# Patient Record
Sex: MALE | Race: WHITE | NOT HISPANIC OR LATINO | URBAN - METROPOLITAN AREA
[De-identification: names, ages, dates, MRNs, and addresses within clinical notes are randomized per-mention and may not be internally consistent; named-entity substitution may affect disease eponyms.]

---

## 2017-06-27 ENCOUNTER — INPATIENT (INPATIENT)
Facility: HOSPITAL | Age: 82
LOS: 7 days | Discharge: NURSING, REHAB OR LONG-TERM CARE | DRG: 65 | End: 2017-07-05
Attending: INTERNAL MEDICINE | Admitting: HOSPITALIST
Payer: MEDICARE

## 2017-06-27 VITALS
HEIGHT: 68 IN | SYSTOLIC BLOOD PRESSURE: 126 MMHG | RESPIRATION RATE: 20 BRPM | DIASTOLIC BLOOD PRESSURE: 73 MMHG | HEART RATE: 67 BPM | WEIGHT: 154.98 LBS | OXYGEN SATURATION: 97 % | TEMPERATURE: 98 F

## 2017-06-27 DIAGNOSIS — R29.810 FACIAL WEAKNESS: ICD-10-CM

## 2017-06-27 LAB
ALBUMIN SERPL ELPH-MCNC: 3.6 G/DL — SIGNIFICANT CHANGE UP (ref 3.3–5.2)
ALP SERPL-CCNC: 51 U/L — SIGNIFICANT CHANGE UP (ref 40–120)
ALT FLD-CCNC: 10 U/L — SIGNIFICANT CHANGE UP
ANION GAP SERPL CALC-SCNC: 11 MMOL/L — SIGNIFICANT CHANGE UP (ref 5–17)
APPEARANCE UR: CLEAR — SIGNIFICANT CHANGE UP
AST SERPL-CCNC: 17 U/L — SIGNIFICANT CHANGE UP
BACTERIA # UR AUTO: ABNORMAL
BASOPHILS # BLD AUTO: 0 K/UL — SIGNIFICANT CHANGE UP (ref 0–0.2)
BASOPHILS NFR BLD AUTO: 0.6 % — SIGNIFICANT CHANGE UP (ref 0–2)
BILIRUB SERPL-MCNC: 1.6 MG/DL — SIGNIFICANT CHANGE UP (ref 0.4–2)
BILIRUB UR-MCNC: NEGATIVE — SIGNIFICANT CHANGE UP
BUN SERPL-MCNC: 15 MG/DL — SIGNIFICANT CHANGE UP (ref 8–20)
CALCIUM SERPL-MCNC: 8.6 MG/DL — SIGNIFICANT CHANGE UP (ref 8.6–10.2)
CHLORIDE SERPL-SCNC: 100 MMOL/L — SIGNIFICANT CHANGE UP (ref 98–107)
CO2 SERPL-SCNC: 25 MMOL/L — SIGNIFICANT CHANGE UP (ref 22–29)
COLOR SPEC: YELLOW — SIGNIFICANT CHANGE UP
CREAT SERPL-MCNC: 0.91 MG/DL — SIGNIFICANT CHANGE UP (ref 0.5–1.3)
DIFF PNL FLD: NEGATIVE — SIGNIFICANT CHANGE UP
EOSINOPHIL # BLD AUTO: 0.1 K/UL — SIGNIFICANT CHANGE UP (ref 0–0.5)
EOSINOPHIL NFR BLD AUTO: 2.1 % — SIGNIFICANT CHANGE UP (ref 0–6)
EPI CELLS # UR: SIGNIFICANT CHANGE UP
GLUCOSE SERPL-MCNC: 137 MG/DL — HIGH (ref 70–115)
GLUCOSE UR QL: NEGATIVE MG/DL — SIGNIFICANT CHANGE UP
HCT VFR BLD CALC: 40 % — LOW (ref 42–52)
HGB BLD-MCNC: 13.6 G/DL — LOW (ref 14–18)
KETONES UR-MCNC: ABNORMAL
LEUKOCYTE ESTERASE UR-ACNC: ABNORMAL
LYMPHOCYTES # BLD AUTO: 0.9 K/UL — LOW (ref 1–4.8)
LYMPHOCYTES # BLD AUTO: 16.6 % — LOW (ref 20–55)
MCHC RBC-ENTMCNC: 30.3 PG — SIGNIFICANT CHANGE UP (ref 27–31)
MCHC RBC-ENTMCNC: 34 G/DL — SIGNIFICANT CHANGE UP (ref 32–36)
MCV RBC AUTO: 89.1 FL — SIGNIFICANT CHANGE UP (ref 80–94)
MONOCYTES # BLD AUTO: 0.4 K/UL — SIGNIFICANT CHANGE UP (ref 0–0.8)
MONOCYTES NFR BLD AUTO: 8.3 % — SIGNIFICANT CHANGE UP (ref 3–10)
NEUTROPHILS # BLD AUTO: 3.8 K/UL — SIGNIFICANT CHANGE UP (ref 1.8–8)
NEUTROPHILS NFR BLD AUTO: 72.4 % — SIGNIFICANT CHANGE UP (ref 37–73)
NITRITE UR-MCNC: NEGATIVE — SIGNIFICANT CHANGE UP
PH UR: 6 — SIGNIFICANT CHANGE UP (ref 5–8)
PLATELET # BLD AUTO: 216 K/UL — SIGNIFICANT CHANGE UP (ref 150–400)
POTASSIUM SERPL-MCNC: 4 MMOL/L — SIGNIFICANT CHANGE UP (ref 3.5–5.3)
POTASSIUM SERPL-SCNC: 4 MMOL/L — SIGNIFICANT CHANGE UP (ref 3.5–5.3)
PROT SERPL-MCNC: 6.2 G/DL — LOW (ref 6.6–8.7)
PROT UR-MCNC: NEGATIVE MG/DL — SIGNIFICANT CHANGE UP
RBC # BLD: 4.49 M/UL — LOW (ref 4.6–6.2)
RBC # FLD: 13 % — SIGNIFICANT CHANGE UP (ref 11–15.6)
RBC CASTS # UR COMP ASSIST: SIGNIFICANT CHANGE UP /HPF (ref 0–4)
SODIUM SERPL-SCNC: 136 MMOL/L — SIGNIFICANT CHANGE UP (ref 135–145)
SP GR SPEC: 1.01 — SIGNIFICANT CHANGE UP (ref 1.01–1.02)
TROPONIN T SERPL-MCNC: <0.01 NG/ML — SIGNIFICANT CHANGE UP (ref 0–0.06)
UROBILINOGEN FLD QL: NEGATIVE MG/DL — SIGNIFICANT CHANGE UP
WBC # BLD: 5.3 K/UL — SIGNIFICANT CHANGE UP (ref 4.8–10.8)
WBC # FLD AUTO: 5.3 K/UL — SIGNIFICANT CHANGE UP (ref 4.8–10.8)
WBC UR QL: SIGNIFICANT CHANGE UP

## 2017-06-27 PROCEDURE — 70450 CT HEAD/BRAIN W/O DYE: CPT | Mod: 26

## 2017-06-27 PROCEDURE — 71010: CPT | Mod: 26

## 2017-06-27 PROCEDURE — 99285 EMERGENCY DEPT VISIT HI MDM: CPT

## 2017-06-27 RX ORDER — ENOXAPARIN SODIUM 100 MG/ML
40 INJECTION SUBCUTANEOUS EVERY 24 HOURS
Qty: 0 | Refills: 0 | Status: DISCONTINUED | OUTPATIENT
Start: 2017-06-27 | End: 2017-06-28

## 2017-06-27 RX ORDER — SODIUM CHLORIDE 9 MG/ML
1000 INJECTION INTRAMUSCULAR; INTRAVENOUS; SUBCUTANEOUS ONCE
Qty: 0 | Refills: 0 | Status: COMPLETED | OUTPATIENT
Start: 2017-06-27 | End: 2017-06-27

## 2017-06-27 RX ORDER — SODIUM CHLORIDE 9 MG/ML
3 INJECTION INTRAMUSCULAR; INTRAVENOUS; SUBCUTANEOUS EVERY 8 HOURS
Qty: 0 | Refills: 0 | Status: DISCONTINUED | OUTPATIENT
Start: 2017-06-27 | End: 2017-06-28

## 2017-06-27 RX ADMIN — SODIUM CHLORIDE 1000 MILLILITER(S): 9 INJECTION INTRAMUSCULAR; INTRAVENOUS; SUBCUTANEOUS at 17:00

## 2017-06-27 NOTE — ED ADULT NURSE NOTE - PMH
HTN (hypertension)    Memory loss or impairment HTN (hypertension)    Memory loss or impairment    Prostate CA

## 2017-06-27 NOTE — ED PROVIDER NOTE - MEDICAL DECISION MAKING DETAILS
86yoM with new L facial droop w/ forehead sparing since Friday.  Plan to check CTh, labs, likely admit for neuro workup 86yoM with new L facial droop w/ forehead sparing since Friday.  Plan to check CTh, labs, likely admit for neuro workup for CVA

## 2017-06-27 NOTE — ED PROVIDER NOTE - OBJECTIVE STATEMENT
Pt is a 86M with HTN, dementia, pw L facial droop since Friday and some mild confusion Pt is a 86M with HTN, dementia, p/w L facial droop since Friday and some mild confusion Pt is a 86M with HTN, dementia, p/w L facial droop since Friday, slightly increased confusion and unsteadiness on his feet.  Family denies prior h/o stroke. Pt normally ambulates independently.  Family notes frequent loose BM x 1 month. No recent antibiotics or hospitalizations.  PMD: Dr. Tj Oro

## 2017-06-27 NOTE — H&P ADULT - PSH
S/P cataract extraction and insertion of intraocular lens, left    S/P cataract extraction and insertion of intraocular lens, right

## 2017-06-27 NOTE — ED PROVIDER NOTE - PHYSICAL EXAMINATION
L facial droop with forehead sparing.  5/5 motor to b/l UE, LE. No sensory deficits  No limb ataxia  No neglect

## 2017-06-27 NOTE — ED ADULT NURSE NOTE - OBJECTIVE STATEMENT
assumed pt care at 1350.  pt awake and alert, hx of memory loss.  at per pts son at bedside, pt began having facial droop and slurred speech yesterday, also states pt recently had tooth pulled.  as per pts son, "pt is in a daze and out of it" more than normal. left sided facial droop present.  pupils equal.  moving all extremities well with equal strength and purpose.  also c/o diarrhea x1 month.  denies pain.  as per pt's son, pt more unsteady on his feet than normal. denies shortness of breath.  abdomen soft, nontender, nondistended.

## 2017-06-27 NOTE — ED ADULT TRIAGE NOTE - CHIEF COMPLAINT QUOTE
Patient arrived to ED today with c/o increased confusion, left sided facial droop for over the past two days, and patient is c/o lightheadedness.  Patient has a tooth pulled two days ago and son states that the droop was before his tooth was pulled.  Patient lives with his son and has history of dementia.

## 2017-06-28 DIAGNOSIS — I10 ESSENTIAL (PRIMARY) HYPERTENSION: ICD-10-CM

## 2017-06-28 DIAGNOSIS — R19.7 DIARRHEA, UNSPECIFIED: ICD-10-CM

## 2017-06-28 DIAGNOSIS — F03.90 UNSPECIFIED DEMENTIA, UNSPECIFIED SEVERITY, WITHOUT BEHAVIORAL DISTURBANCE, PSYCHOTIC DISTURBANCE, MOOD DISTURBANCE, AND ANXIETY: ICD-10-CM

## 2017-06-28 DIAGNOSIS — Z98.41 CATARACT EXTRACTION STATUS, RIGHT EYE: Chronic | ICD-10-CM

## 2017-06-28 DIAGNOSIS — I63.9 CEREBRAL INFARCTION, UNSPECIFIED: ICD-10-CM

## 2017-06-28 DIAGNOSIS — Z98.42 CATARACT EXTRACTION STATUS, LEFT EYE: Chronic | ICD-10-CM

## 2017-06-28 LAB
C DIFF BY PCR RESULT: SIGNIFICANT CHANGE UP
C DIFF TOX GENS STL QL NAA+PROBE: SIGNIFICANT CHANGE UP
CHOLEST SERPL-MCNC: 209 MG/DL — HIGH (ref 110–199)
HBA1C BLD-MCNC: 5.4 % — SIGNIFICANT CHANGE UP (ref 4–5.6)
HDLC SERPL-MCNC: 56 MG/DL — SIGNIFICANT CHANGE UP
LIPID PNL WITH DIRECT LDL SERPL: 132 MG/DL — SIGNIFICANT CHANGE UP
OB PNL STL: POSITIVE
TOTAL CHOLESTEROL/HDL RATIO MEASUREMENT: 4 RATIO — SIGNIFICANT CHANGE UP (ref 3.4–9.6)
TRIGL SERPL-MCNC: 104 MG/DL — SIGNIFICANT CHANGE UP (ref 10–200)

## 2017-06-28 PROCEDURE — 99233 SBSQ HOSP IP/OBS HIGH 50: CPT

## 2017-06-28 RX ORDER — HALOPERIDOL DECANOATE 100 MG/ML
1 INJECTION INTRAMUSCULAR ONCE
Qty: 0 | Refills: 0 | Status: COMPLETED | OUTPATIENT
Start: 2017-06-28 | End: 2017-06-28

## 2017-06-28 RX ORDER — DONEPEZIL HYDROCHLORIDE 10 MG/1
5 TABLET, FILM COATED ORAL AT BEDTIME
Qty: 0 | Refills: 0 | Status: DISCONTINUED | OUTPATIENT
Start: 2017-06-28 | End: 2017-06-28

## 2017-06-28 RX ORDER — ATORVASTATIN CALCIUM 80 MG/1
20 TABLET, FILM COATED ORAL AT BEDTIME
Qty: 0 | Refills: 0 | Status: DISCONTINUED | OUTPATIENT
Start: 2017-06-28 | End: 2017-06-28

## 2017-06-28 RX ORDER — METRONIDAZOLE 500 MG
TABLET ORAL
Qty: 0 | Refills: 0 | Status: DISCONTINUED | OUTPATIENT
Start: 2017-06-28 | End: 2017-06-30

## 2017-06-28 RX ORDER — METRONIDAZOLE 500 MG
500 TABLET ORAL ONCE
Qty: 0 | Refills: 0 | Status: COMPLETED | OUTPATIENT
Start: 2017-06-28 | End: 2017-06-28

## 2017-06-28 RX ORDER — ASPIRIN/CALCIUM CARB/MAGNESIUM 324 MG
325 TABLET ORAL DAILY
Qty: 0 | Refills: 0 | Status: DISCONTINUED | OUTPATIENT
Start: 2017-06-28 | End: 2017-06-28

## 2017-06-28 RX ORDER — LOSARTAN POTASSIUM 100 MG/1
50 TABLET, FILM COATED ORAL DAILY
Qty: 0 | Refills: 0 | Status: DISCONTINUED | OUTPATIENT
Start: 2017-06-28 | End: 2017-07-04

## 2017-06-28 RX ORDER — METRONIDAZOLE 500 MG
500 TABLET ORAL EVERY 8 HOURS
Qty: 0 | Refills: 0 | Status: DISCONTINUED | OUTPATIENT
Start: 2017-06-28 | End: 2017-06-30

## 2017-06-28 RX ORDER — MEMANTINE HYDROCHLORIDE 10 MG/1
5 TABLET ORAL DAILY
Qty: 0 | Refills: 0 | Status: DISCONTINUED | OUTPATIENT
Start: 2017-06-28 | End: 2017-06-28

## 2017-06-28 RX ADMIN — LOSARTAN POTASSIUM 50 MILLIGRAM(S): 100 TABLET, FILM COATED ORAL at 06:30

## 2017-06-28 RX ADMIN — SODIUM CHLORIDE 3 MILLILITER(S): 9 INJECTION INTRAMUSCULAR; INTRAVENOUS; SUBCUTANEOUS at 05:40

## 2017-06-28 RX ADMIN — SODIUM CHLORIDE 3 MILLILITER(S): 9 INJECTION INTRAMUSCULAR; INTRAVENOUS; SUBCUTANEOUS at 14:25

## 2017-06-28 RX ADMIN — HALOPERIDOL DECANOATE 1 MILLIGRAM(S): 100 INJECTION INTRAMUSCULAR at 23:32

## 2017-06-28 RX ADMIN — Medication 100 MILLIGRAM(S): at 18:11

## 2017-06-28 RX ADMIN — DONEPEZIL HYDROCHLORIDE 5 MILLIGRAM(S): 10 TABLET, FILM COATED ORAL at 23:32

## 2017-06-28 RX ADMIN — ENOXAPARIN SODIUM 40 MILLIGRAM(S): 100 INJECTION SUBCUTANEOUS at 06:30

## 2017-06-28 RX ADMIN — ATORVASTATIN CALCIUM 20 MILLIGRAM(S): 80 TABLET, FILM COATED ORAL at 23:32

## 2017-06-28 RX ADMIN — Medication 100 MILLIGRAM(S): at 11:32

## 2017-06-28 RX ADMIN — Medication 100 MILLIGRAM(S): at 01:55

## 2017-06-28 RX ADMIN — Medication 325 MILLIGRAM(S): at 11:33

## 2017-06-28 NOTE — CONSULT NOTE ADULT - SUBJECTIVE AND OBJECTIVE BOX
CHIEF COMPLAINT: falls, facial droop    HPI: 86yMale    87 y/o male with h/o mild dementia and HTN, prostate cancer, who was previously full ambulating, was noticed by the family that he has facial droop and looks weak and unsteady 4 days ago. no headache, speech changes, or blurry vision. also frequent bowel movements with loose stools for the last2-3 weeks. no h/o Abx. o nausea, vomiting, or fever. CT brain shows no acute changes.    PAST MEDICAL & SURGICAL HISTORY:  Prostate CA  Memory loss or impairment  HTN (hypertension)  S/P cataract extraction and insertion of intraocular lens, left  S/P cataract extraction and insertion of intraocular lens, right    MEDICATIONS  (STANDING):  enoxaparin Injectable 40 milliGRAM(s) SubCutaneous every 24 hours  sodium chloride 0.9% lock flush 3 milliLiter(s) IV Push every 8 hours  losartan 50 milliGRAM(s) Oral daily  metroNIDAZOLE  IVPB   IV Intermittent   metroNIDAZOLE  IVPB 500 milliGRAM(s) IV Intermittent every 8 hours  aspirin enteric coated 325 milliGRAM(s) Oral daily  atorvastatin 20 milliGRAM(s) Oral at bedtime    MEDICATIONS  (PRN):    Allergies    No Known Allergies    Intolerances        FAMILY HISTORY:  No pertinent family history in first degree relatives          SOCIAL HISTORY:    Tobacco:    Alcohol:    Drugs:          REVIEW OF SYSTEMS:    Relevant systems are negative except as noted in the chart, HPI, and PMH      VITAL SIGNS:  Vital Signs Last 24 Hrs  T(C): 36.2 (28 Jun 2017 07:45), Max: 36.9 (27 Jun 2017 12:31)  T(F): 97.2 (28 Jun 2017 07:45), Max: 98.4 (27 Jun 2017 12:31)  HR: 56 (28 Jun 2017 07:45) (56 - 67)  BP: 149/63 (28 Jun 2017 07:45) (126/73 - 149/63)  BP(mean): --  RR: 16 (28 Jun 2017 07:45) (16 - 20)  SpO2: 98% (28 Jun 2017 07:45) (97% - 100%)    PHYSICAL EXAMINATION:    General: Well-developed, well nourished, in no acute distress.  Cardiac:  Regular rate and rhythm. No carotid bruits appreciated.  Eyes: Fundoscopic examination was deferred.  Neurologic:  - Mental Status:  Alert, awake, oriented to person, place, and time; Speech is fluent. Language is normal. Follows commands well.  Insight and knowledge appear appropriate.  Cranial Nerves II-XII:    II:  Visual acuity is normal for age ; Visual fields are full to confrontation; Pupils are equal, round, and reactive to light.  III, IV, VI:  Extraocular movements are intact without nystagmus.  V:  Facial sensation is intact in the V1-V3 distribution bilaterally.  VII:  Face is symmetric with normal eye closure and smile  VIII:  Hearing is grossly intact  IX, X, XII:  speech is clear  XI:  Head turning and shoulder shrug are intact.  - Motor:  Strength is 5/5 x 4.   There is no pronator drift. .  - Reflexes:  2+ and symmetric at the knees.  Plantar responses flexor.  - Sensory:  Symmetric to light touch  - Coordination:  Finger-nose-finger is normal. Rapid alternating hand and foot  movements are intact. Dexterity appears normal      LABS:                          13.6   5.3   )-----------( 216      ( 27 Jun 2017 15:10 )             40.0     27 Jun 2017 15:10    136    |  100    |  15.0   ----------------------------<  137    4.0     |  25.0   |  0.91     Ca    8.6        27 Jun 2017 15:10    TPro  6.2    /  Alb  3.6    /  TBili  1.6    /  DBili  x      /  AST  17     /  ALT  10     /  AlkPhos  51     27 Jun 2017 15:10    LIVER FUNCTIONS - ( 27 Jun 2017 15:10 )  Alb: 3.6 g/dL / Pro: 6.2 g/dL / ALK PHOS: 51 U/L / ALT: 10 U/L / AST: 17 U/L / GGT: x                 RADIOLOGY & ADDITIONAL STUDIES:    CT- microvascular changes, no acute findings    IMPRESSION:      PLAN:  1. dementia work up- labs-TSH, B12, etc,   2. stroke protocols  3. Cerebrovascular risk factor assessment and management  4.Medical and Cardiac evaluation and treatment as indicated  5.PT- gait, rehab, dispo CHIEF COMPLAINT: falls, facial droop    HPI: 86yMale    87 y/o male with h/o mild dementia and HTN, prostate cancer, who was previously full ambulating, was noticed by the family that he has facial droop and looks weak and unsteady 4 days ago. no headache, speech changes, or blurry vision. also frequent bowel movements with loose stools for the last2-3 weeks. no h/o Abx. o nausea, vomiting, or fever. CT brain shows no acute changes.    PAST MEDICAL & SURGICAL HISTORY:  Prostate CA  Memory loss or impairment  HTN (hypertension)  S/P cataract extraction and insertion of intraocular lens, left  S/P cataract extraction and insertion of intraocular lens, right    MEDICATIONS  (STANDING):  enoxaparin Injectable 40 milliGRAM(s) SubCutaneous every 24 hours  sodium chloride 0.9% lock flush 3 milliLiter(s) IV Push every 8 hours  losartan 50 milliGRAM(s) Oral daily  metroNIDAZOLE  IVPB   IV Intermittent   metroNIDAZOLE  IVPB 500 milliGRAM(s) IV Intermittent every 8 hours  aspirin enteric coated 325 milliGRAM(s) Oral daily  atorvastatin 20 milliGRAM(s) Oral at bedtime    MEDICATIONS  (PRN):    Allergies    No Known Allergies    Intolerances        FAMILY HISTORY:  No pertinent family history in first degree relatives          SOCIAL HISTORY:    Tobacco:  no  Alcohol:  no  Drugs:  no        REVIEW OF SYSTEMS:    Relevant systems are negative except as noted in the chart, HPI, and PMH      VITAL SIGNS:  Vital Signs Last 24 Hrs  T(C): 36.2 (28 Jun 2017 07:45), Max: 36.9 (27 Jun 2017 12:31)  T(F): 97.2 (28 Jun 2017 07:45), Max: 98.4 (27 Jun 2017 12:31)  HR: 56 (28 Jun 2017 07:45) (56 - 67)  BP: 149/63 (28 Jun 2017 07:45) (126/73 - 149/63)  BP(mean): --  RR: 16 (28 Jun 2017 07:45) (16 - 20)  SpO2: 98% (28 Jun 2017 07:45) (97% - 100%)    PHYSICAL EXAMINATION:    General: Well-developed, well nourished, in no acute distress.  Cardiac:  Regular rate and rhythm. No carotid bruits appreciated.  Eyes: Fundoscopic examination was deferred.  Neurologic:  - Mental Status:  Alert, awake, oriented to person only Speech is slightly stuttered. Language is normal. Follows commands well.  Cranial Nerves II-XII:    II:  Visual acuity is normal for age ; Visual fields are full to confrontation; Pupils are equal, round, and reactive to light.  III, IV, VI:  Extraocular movements are intact without nystagmus.  V:  Facial sensation is intact  VII:  Face- left facial droop  VIII:  Hearing is grossly intact  IX, X, XII:  speech is clear  XI:  Head turning and shoulder shrug are intact.  - Motor:  Strength- moves right well. 4+/5 left   There is slight left pronator drift. .  - Reflexes:  1+ and symmetric at the knees.  Plantar responses flexor.  - Sensory:  Symmetric to light touch  - Coordination:   Right- Finger-nose-finger is normal. Rapid alternating hand and foot  movements are intact. Dexterity appears normal -       LABS:                          13.6   5.3   )-----------( 216      ( 27 Jun 2017 15:10 )             40.0     27 Jun 2017 15:10    136    |  100    |  15.0   ----------------------------<  137    4.0     |  25.0   |  0.91     Ca    8.6        27 Jun 2017 15:10    TPro  6.2    /  Alb  3.6    /  TBili  1.6    /  DBili  x      /  AST  17     /  ALT  10     /  AlkPhos  51     27 Jun 2017 15:10    LIVER FUNCTIONS - ( 27 Jun 2017 15:10 )  Alb: 3.6 g/dL / Pro: 6.2 g/dL / ALK PHOS: 51 U/L / ALT: 10 U/L / AST: 17 U/L / GGT: x                 RADIOLOGY & ADDITIONAL STUDIES:    CT- microvascular changes, no acute findings    IMPRESSION:      PLAN:  1. dementia work up- labs-TSH, B12, etc,  Consider aricept  2. stroke protocols  3. Cerebrovascular risk factor assessment and management  4.Medical and Cardiac evaluation and treatment as indicated  5.PT- gait, rehab, dispo

## 2017-06-28 NOTE — ED ADULT NURSE REASSESSMENT NOTE - NS ED NURSE REASSESS COMMENT FT1
dr fernandes aware of patient neg c diff + occult blood nno
pateint awake alert and oriented x 1 at baseline with dementia. nsr on cardiac monitor. moves all extremities without difficulty. stroke neuro checks as documented. denies any complaints.
pt incontinent of urine and stool. redness noted to sacrum/coccyx. barrier cream applied. incontinence care provided. stool sample sent.
son at bedside with dtr in law. updated regarding plan of care. patient tearful; expressing that he misses his wife and wants to be with her. patient denies si/hi. will speak with md regarding psych eval.
spoke with [facility] psych np.
pt ambulated to bathroom with assistance.  no signs of distress.  urine specimen sent to lab.  family at bedside.  awaiting CT
pt awake alert and oriented x 1 at baseline with dementia. patient denies any complaints at this time. sinus aury on cardiac monitor. moves all extremities without difficulty. awaiting sdu bed and mri. mri called; no answer.
rcd handoff report from offgoing RN, pt in no apparent distress, alert and awake, able to follow commands, no verbal responses at this time, pt pulled IV line and is incontinent of urine and feces, new saline lock placed and perineal care provided, linens changed, pt maintaining NSR on cardiac monitor, respirations even and unlabored, no obvious facial drop noted, pt refuse to smile for NIH assessment, able to move all extremities
pt with unsteady gait with pt. patient will require charity. patient confused; dementia at baseline. stroke neuro check as documented. awaiting bed and stool sample.
Received report from RN SAMAN Mensah. Patient bed moved; placed on cardiac monitor. Sinus bradycardia on cardiac monitor. Awake alert and oriented x2. Lungs clear to auscultation. respirations even and unlabored. abdomen soft non-tender. moves all extremities without difficulty. patient pending stool sample. awaiting mri. nih as documented.

## 2017-06-28 NOTE — ED ADULT NURSE REASSESSMENT NOTE - COMFORT CARE
plan of care explained/warm blanket provided/assisted to bathroom
po fluids offered/repositioned/meal provided
plan of care explained/repositioned/side rails up

## 2017-06-28 NOTE — ED ADULT NURSE REASSESSMENT NOTE - NIH STROKE SCALE: 9. BEST LANGUAGE, QM
(0) No aphasia; normal
(1) Mild-to-moderate aphasia; some obvious loss of fluency or facility of comprehension, w/o significant limitation on ideas expressed or form of expression. Reduction of speech and/or comprehension, however, makes conversation about provided material difficult or impossible.  For example, in conversation about provided materials, examiner can identify picture or naming card content from patient's response.

## 2017-06-28 NOTE — PROGRESS NOTE ADULT - SUBJECTIVE AND OBJECTIVE BOX
VEENA BHATT    [id number]    86y      Male    INTERVAL HPI/OVERNIGHT EVENTS: No events on.     Hospital course:  87 yo M with h/o dementia, prostate CA, HTN presented with worsening facial droop, weakness, and unsteadiness for 4 days. In the ED, head CT did not show acute changes. NIHSS 1.     REVIEW OF SYSTEMS:    CONSTITUTIONAL: No fevers  CARDIOVASCULAR: No chest pain, palpitations    Vital Signs Last 24 Hrs  T(C): 36.5 (2017 11:31), Max: 36.6 (2017 05:25)  T(F): 97.7 (2017 11:31), Max: 97.9 (2017 05:25)  HR: 67 (2017 11:31) (56 - 67)  BP: 150/72 (2017 11:31) (131/72 - 150/72)  BP(mean): --  RR: 16 (2017 11:31) (16 - 20)  SpO2: 96% (2017 11:31) (96% - 100%)    PHYSICAL EXAM:    GENERAL: NAD   HEENT: PERRL, +EOMI, MMM  CHEST/LUNG: Clear to percussion bilaterally   HEART: S1S2+, Regular rate and rhythm   ABDOMEN: Soft, Nontender, Nondistended; Bowel sounds present  NEURO: AAOX1 (self), +right facial droop      LABS:                        13.6   5.3   )-----------( 216      ( 2017 15:10 )             40.0     06-27    136  |  100  |  15.0  ----------------------------<  137<H>  4.0   |  25.0  |  0.91    Ca    8.6      2017 15:10    TPro  6.2<L>  /  Alb  3.6  /  TBili  1.6  /  DBili  x   /  AST  17  /  ALT  10  /  AlkPhos  51  -      Urinalysis Basic - ( 2017 18:49 )    Color: Yellow / Appearance: Clear / S.015 / pH: x  Gluc: x / Ketone: Trace  / Bili: Negative / Urobili: Negative mg/dL   Blood: x / Protein: Negative mg/dL / Nitrite: Negative   Leuk Esterase: Trace / RBC: 0-2 /HPF / WBC 3-5   Sq Epi: x / Non Sq Epi: x / Bacteria: x          MEDICATIONS  (STANDING):  enoxaparin Injectable 40 milliGRAM(s) SubCutaneous every 24 hours  sodium chloride 0.9% lock flush 3 milliLiter(s) IV Push every 8 hours  losartan 50 milliGRAM(s) Oral daily  metroNIDAZOLE  IVPB   IV Intermittent   metroNIDAZOLE  IVPB 500 milliGRAM(s) IV Intermittent every 8 hours  aspirin enteric coated 325 milliGRAM(s) Oral daily  atorvastatin 20 milliGRAM(s) Oral at bedtime    MEDICATIONS  (PRN):      RADIOLOGY & ADDITIONAL TESTS:

## 2017-06-29 DIAGNOSIS — F03.90 UNSPECIFIED DEMENTIA, UNSPECIFIED SEVERITY, WITHOUT BEHAVIORAL DISTURBANCE, PSYCHOTIC DISTURBANCE, MOOD DISTURBANCE, AND ANXIETY: ICD-10-CM

## 2017-06-29 LAB
ALBUMIN SERPL ELPH-MCNC: 3.6 G/DL — SIGNIFICANT CHANGE UP (ref 3.3–5.2)
ALP SERPL-CCNC: 52 U/L — SIGNIFICANT CHANGE UP (ref 40–120)
ALT FLD-CCNC: 10 U/L — SIGNIFICANT CHANGE UP
ANION GAP SERPL CALC-SCNC: 11 MMOL/L — SIGNIFICANT CHANGE UP (ref 5–17)
AST SERPL-CCNC: 15 U/L — SIGNIFICANT CHANGE UP
BILIRUB SERPL-MCNC: 1.6 MG/DL — SIGNIFICANT CHANGE UP (ref 0.4–2)
BUN SERPL-MCNC: 12 MG/DL — SIGNIFICANT CHANGE UP (ref 8–20)
CALCIUM SERPL-MCNC: 8.7 MG/DL — SIGNIFICANT CHANGE UP (ref 8.6–10.2)
CHLORIDE SERPL-SCNC: 101 MMOL/L — SIGNIFICANT CHANGE UP (ref 98–107)
CO2 SERPL-SCNC: 26 MMOL/L — SIGNIFICANT CHANGE UP (ref 22–29)
CREAT SERPL-MCNC: 0.84 MG/DL — SIGNIFICANT CHANGE UP (ref 0.5–1.3)
GLUCOSE SERPL-MCNC: 103 MG/DL — SIGNIFICANT CHANGE UP (ref 70–115)
HCT VFR BLD CALC: 39.5 % — LOW (ref 42–52)
HCT VFR BLD CALC: 40.2 % — LOW (ref 42–52)
HGB BLD-MCNC: 13.8 G/DL — LOW (ref 14–18)
HGB BLD-MCNC: 14 G/DL — SIGNIFICANT CHANGE UP (ref 14–18)
MAGNESIUM SERPL-MCNC: 2 MG/DL — SIGNIFICANT CHANGE UP (ref 1.6–2.6)
MCHC RBC-ENTMCNC: 30.5 PG — SIGNIFICANT CHANGE UP (ref 27–31)
MCHC RBC-ENTMCNC: 34.8 G/DL — SIGNIFICANT CHANGE UP (ref 32–36)
MCV RBC AUTO: 87.6 FL — SIGNIFICANT CHANGE UP (ref 80–94)
PLATELET # BLD AUTO: 226 K/UL — SIGNIFICANT CHANGE UP (ref 150–400)
POTASSIUM SERPL-MCNC: 3.8 MMOL/L — SIGNIFICANT CHANGE UP (ref 3.5–5.3)
POTASSIUM SERPL-SCNC: 3.8 MMOL/L — SIGNIFICANT CHANGE UP (ref 3.5–5.3)
PROT SERPL-MCNC: 6.2 G/DL — LOW (ref 6.6–8.7)
RBC # BLD: 4.59 M/UL — LOW (ref 4.6–6.2)
RBC # FLD: 13.3 % — SIGNIFICANT CHANGE UP (ref 11–15.6)
SODIUM SERPL-SCNC: 138 MMOL/L — SIGNIFICANT CHANGE UP (ref 135–145)
TSH SERPL-MCNC: 2.02 UIU/ML — SIGNIFICANT CHANGE UP (ref 0.27–4.2)
VIT B12 SERPL-MCNC: 346 PG/ML — SIGNIFICANT CHANGE UP (ref 180–914)
WBC # BLD: 7.2 K/UL — SIGNIFICANT CHANGE UP (ref 4.8–10.8)
WBC # FLD AUTO: 7.2 K/UL — SIGNIFICANT CHANGE UP (ref 4.8–10.8)

## 2017-06-29 PROCEDURE — 70551 MRI BRAIN STEM W/O DYE: CPT | Mod: 26

## 2017-06-29 PROCEDURE — 99233 SBSQ HOSP IP/OBS HIGH 50: CPT

## 2017-06-29 PROCEDURE — 70547 MR ANGIOGRAPHY NECK W/O DYE: CPT | Mod: 26

## 2017-06-29 PROCEDURE — 93306 TTE W/DOPPLER COMPLETE: CPT | Mod: 26

## 2017-06-29 PROCEDURE — 99223 1ST HOSP IP/OBS HIGH 75: CPT

## 2017-06-29 PROCEDURE — 70544 MR ANGIOGRAPHY HEAD W/O DYE: CPT | Mod: 26,59

## 2017-06-29 RX ADMIN — SODIUM CHLORIDE 3 MILLILITER(S): 9 INJECTION INTRAMUSCULAR; INTRAVENOUS; SUBCUTANEOUS at 06:32

## 2017-06-29 RX ADMIN — ATORVASTATIN CALCIUM 20 MILLIGRAM(S): 80 TABLET, FILM COATED ORAL at 23:07

## 2017-06-29 RX ADMIN — SODIUM CHLORIDE 3 MILLILITER(S): 9 INJECTION INTRAMUSCULAR; INTRAVENOUS; SUBCUTANEOUS at 14:49

## 2017-06-29 RX ADMIN — DONEPEZIL HYDROCHLORIDE 5 MILLIGRAM(S): 10 TABLET, FILM COATED ORAL at 23:07

## 2017-06-29 RX ADMIN — Medication 100 MILLIGRAM(S): at 13:09

## 2017-06-29 RX ADMIN — MEMANTINE HYDROCHLORIDE 5 MILLIGRAM(S): 10 TABLET ORAL at 13:11

## 2017-06-29 RX ADMIN — SODIUM CHLORIDE 3 MILLILITER(S): 9 INJECTION INTRAMUSCULAR; INTRAVENOUS; SUBCUTANEOUS at 22:06

## 2017-06-29 RX ADMIN — ENOXAPARIN SODIUM 40 MILLIGRAM(S): 100 INJECTION SUBCUTANEOUS at 06:34

## 2017-06-29 RX ADMIN — Medication 100 MILLIGRAM(S): at 02:30

## 2017-06-29 RX ADMIN — Medication 100 MILLIGRAM(S): at 23:07

## 2017-06-29 RX ADMIN — Medication 325 MILLIGRAM(S): at 13:10

## 2017-06-29 NOTE — DISCHARGE NOTE ADULT - SECONDARY DIAGNOSIS.
Dementia without behavioral disturbance, unspecified dementia type Essential hypertension Diarrhea, unspecified type

## 2017-06-29 NOTE — DISCHARGE NOTE ADULT - CARE PLAN
Principal Discharge DX:	Cerebrovascular accident (CVA), unspecified mechanism  Goal:	Therapeutic optimization  Instructions for follow-up, activity and diet:	Continue with aspirin. Follow up with your neurologist in one week.  Secondary Diagnosis:	Dementia without behavioral disturbance, unspecified dementia type  Instructions for follow-up, activity and diet:	Continue with namzaric.  Secondary Diagnosis:	Essential hypertension  Instructions for follow-up, activity and diet:	Continue with losartan.  Secondary Diagnosis:	Diarrhea, unspecified type  Instructions for follow-up, activity and diet:	Continue with flagyl as prescribed until July 3. Principal Discharge DX:	Cerebrovascular accident (CVA), unspecified mechanism  Goal:	Therapeutic optimization  Instructions for follow-up, activity and diet:	Continue with aspirin. Follow up with your neurologist in one week.  Secondary Diagnosis:	Dementia without behavioral disturbance, unspecified dementia type  Instructions for follow-up, activity and diet:	Continue with namzaric.  Secondary Diagnosis:	Essential hypertension  Instructions for follow-up, activity and diet:	Continue with losartan.  Secondary Diagnosis:	Diarrhea, unspecified type  Instructions for follow-up, activity and diet:	Antibiotics completed.

## 2017-06-29 NOTE — DISCHARGE NOTE ADULT - NS AS DC STROKE ED MATERIALS
Need for Followup After Discharge/Stroke Education Booklet/Risk Factors for Stroke/Call 911 for Stroke/Stroke Warning Signs and Symptoms/Prescribed Medications

## 2017-06-29 NOTE — ED ADULT NURSE REASSESSMENT NOTE - NIH STROKE SCALE: 6A. MOTOR LEG, LEFT, QM
(0) No drift; leg holds 30 degree position for full 5 secs

## 2017-06-29 NOTE — PROGRESS NOTE ADULT - SUBJECTIVE AND OBJECTIVE BOX
INTERVAL HISTORY:  new day. nothing new      VITAL SIGNS:  Vital Signs Last 24 Hrs  T(C): 36.6 (29 Jun 2017 07:12), Max: 36.6 (28 Jun 2017 15:25)  T(F): 97.8 (29 Jun 2017 07:12), Max: 97.9 (28 Jun 2017 15:25)  HR: 61 (29 Jun 2017 05:50) (61 - 71)  BP: 131/71 (29 Jun 2017 05:50) (116/60 - 150/72)  BP(mean): --  RR: 14 (29 Jun 2017 05:50) (14 - 16)  SpO2: 97% (29 Jun 2017 05:50) (96% - 99%)    PHYSICAL EXAMINATION:    Mentation:  awake, oriented to name only  Language/Speech: nl  CN:  Visual Fields:  Motor: moves 4 limbs  Sensory:  DTR:  Babinski:      MEDS:  MEDICATIONS  (STANDING):  enoxaparin Injectable 40 milliGRAM(s) SubCutaneous every 24 hours  sodium chloride 0.9% lock flush 3 milliLiter(s) IV Push every 8 hours  losartan 50 milliGRAM(s) Oral daily  metroNIDAZOLE  IVPB   IV Intermittent   metroNIDAZOLE  IVPB 500 milliGRAM(s) IV Intermittent every 8 hours  aspirin enteric coated 325 milliGRAM(s) Oral daily  atorvastatin 20 milliGRAM(s) Oral at bedtime  donepezil 5 milliGRAM(s) Oral at bedtime  memantine 5 milliGRAM(s) Oral daily    MEDICATIONS  (PRN):      LABS:                          13.8   x     )-----------( x        ( 29 Jun 2017 01:22 )             39.5     06-27    136  |  100  |  15.0  ----------------------------<  137<H>  4.0   |  25.0  |  0.91    Ca    8.6      27 Jun 2017 15:10    TPro  6.2<L>  /  Alb  3.6  /  TBili  1.6  /  DBili  x   /  AST  17  /  ALT  10  /  AlkPhos  51  06-27    LIVER FUNCTIONS - ( 27 Jun 2017 15:10 )  Alb: 3.6 g/dL / Pro: 6.2 g/dL / ALK PHOS: 51 U/L / ALT: 10 U/L / AST: 17 U/L / GGT: x               RADIOLOGY & ADDITIONAL STUDIES:    Awaiting MRI  Awaiting labs    IMPRESSION & PLAN:    No changes in past day. Still awaiting studies    R/o CVA  Dementia  Falls/Gait disturbance

## 2017-06-29 NOTE — ED ADULT NURSE REASSESSMENT NOTE - NURSING NEURO ORIENTATION
disoriented to time/situation/disoriented to place
disoriented to place/disoriented to time/situation
disoriented to place/disoriented to time/situation
disoriented to time/situation/disoriented to place

## 2017-06-29 NOTE — BEHAVIORAL HEALTH ASSESSMENT NOTE - HPI (INCLUDE ILLNESS QUALITY, SEVERITY, DURATION, TIMING, CONTEXT, MODIFYING FACTORS, ASSOCIATED SIGNS AND SYMPTOMS)
Patient is a 85 y/o  male with two adult children, PMH of HTN, dementia, and prostate ca, admitted to [facility] 6/27/2017 from home for increased confusion, unsteady gait and facial droop, Patient found to have acute nonhemorrhagic infarct to basal ganglia. Patient seen on medicine today, psychiatry consulted for depression.  Patient is currently alert and oriented x1. He has some difficulty with word find, obeys simple commands, and has slightly slurred speech.  Patient admits his mood is sad. He is confused and reports prior to coming in to the hospital he was home with his wife. He denies current or recent S/H/I/I/I, A/V/H, or thoughts of paranoia. Chart reports h/o dementia without behavioral disturbance. Patient reports he enjoys his sons and would like to go home.  Patient was seen by neurology and started on Aricept 5 mg daily and Namenda 5 mg daily.  Last evening he received Haldol 1 mg IM x1.  Received collateral from daughter-in-law who reports patient wife passed 5/2/2017. Daughter-in-law feels patient was grieving appropriately and prior to hospitalization [facility] and attended [facility] events. Although patient remained intermittently tearful regarding the loss of his wife, he appeared to enjoy activities with his family. Patient has had declining short term memory for the past year with intact long term memory. After the death of his wife he went to live with his son and daughter-in-law. He was able to shower and groom self however required assistance with meals and other ADL's.  Patient has never been tx for psychiatric reasons and has not h/o behavioral disturbances or substance abuse.

## 2017-06-29 NOTE — ED ADULT NURSE REASSESSMENT NOTE - FACIAL SYMMETRY
symmetrical
with smile/left facial droop
when smiling/left facial droop
left facial droop/only when smiling

## 2017-06-29 NOTE — DISCHARGE NOTE ADULT - PATIENT PORTAL LINK FT
“You can access the [facility] Patient Portal, offered by [facility], by registering with the following website: [email or web address]”

## 2017-06-29 NOTE — BEHAVIORAL HEALTH ASSESSMENT NOTE - SUMMARY
Patient is a 87 y/o  male with two adult children, PMH of HTN, dementia, and prostate ca, admitted to [facility] 6/27/2017 from home for increased confusion, unsteady gait and facial droop, Patient found to have acute nonhemorrhagic infarct to basal ganglia. Patient seen on medicine today, psychiatry consulted for depression  Patient seen and evaluation. Attempted to reach family for collateral. As per Dr Zamora, plan is to eventually discharge to [facility]. Patient presents calm, oriented x1 with impaired memory, attention, concentration. He is tearful when he talks about his family and home. He was seen by neurology and started on cholinesterase inhibitor for demential  Recommend  1. would not start antidepressant in the context of delirium  2. avoid benzodiazepines which can increase confusion  3. Patient should be followed by psychiatry in [facility]  4. psychiatry will follow at [facility] and assess mental status and mood sx.

## 2017-06-29 NOTE — ED ADULT NURSE REASSESSMENT NOTE - NURSING NEURO LEVEL OF CONSCIOUSNESS
alert and awake/follows commands
alert and awake/follows commands
alert and awake
alert and awake/follows commands

## 2017-06-29 NOTE — BEHAVIORAL HEALTH ASSESSMENT NOTE - NSBHSUICPROTECTFACT_PSY_A_CORE
Responsibility to family and others/Identifies reasons for living/Positive therapeutic relationships/Supportive social network or family

## 2017-06-29 NOTE — DISCHARGE NOTE ADULT - CARE PROVIDER_API CALL
Shorty,   Phone: (   )    -  Fax: (   )    - Shorty,   Phone: (   )    -  Fax: (   )    -    Dez Gooden (FELISHA; MPH), Internal Medicine  [address] [place] [address]  Phone: [phone]  Fax: [phone]    Satinder Bailey), Neurology  [address] [place] [address]  Phone: [phone]  Fax: [phone]

## 2017-06-29 NOTE — ED ADULT NURSE REASSESSMENT NOTE - NIH STROKE SCALE: 1B. LOC QUESTIONS, QM
(1) Answers one question correctly

## 2017-06-29 NOTE — DISCHARGE NOTE ADULT - PLAN OF CARE
Therapeutic optimization Continue with aspirin. Follow up with your neurologist in one week. Continue with namzaric. Continue with losartan. Continue with flagyl as prescribed until July 3. Antibiotics completed.

## 2017-06-29 NOTE — ED ADULT NURSE REASSESSMENT NOTE - NIH STROKE SCALE: 1C. LOC COMMANDS, QM
(0) Performs both tasks correctly

## 2017-06-29 NOTE — DISCHARGE NOTE ADULT - MEDICATION SUMMARY - MEDICATIONS TO TAKE
I will START or STAY ON the medications listed below when I get home from the hospital:    metroNIDAZOLE 500 mg oral tablet  -- 1 tab(s) by mouth every 8 hours  -- Indication: For Diarrhea, unspecified type    aspirin 325 mg oral delayed release tablet  -- 1 tab(s) by mouth once a day  -- Indication: For Cerebrovascular accident (CVA), unspecified mechanism    losartan 50 mg oral tablet  -- 1 tab(s) by mouth once a day  -- Indication: For Essential hypertension    atorvastatin 20 mg oral tablet  -- 1 tab(s) by mouth once a day (at bedtime)  -- Indication: For Cerebrovascular accident (CVA), unspecified mechanism    Namzaric 10 mg-7 mg oral capsule, extended release  -- 1 cap(s) by mouth once a day  -- Indication: For Dementia without behavioral disturbance, unspecified dementia type    cyanocobalamin 500 mcg oral tablet  -- 1 tab(s) by mouth once a day  -- Indication: For Dementia without behavioral disturbance, unspecified dementia type I will START or STAY ON the medications listed below when I get home from the hospital:    metroNIDAZOLE 500 mg oral tablet  -- 1 tab(s) by mouth every 8 hours  -- Indication: For Diarrhea, unspecified type    aspirin 325 mg oral delayed release tablet  -- 1 tab(s) by mouth once a day  -- Indication: For Cerebrovascular accident (CVA), unspecified mechanism    losartan 50 mg oral tablet  -- 1 tab(s) by mouth once a day  -- Indication: For Essential hypertension    atorvastatin 20 mg oral tablet  -- 1 tab(s) by mouth once a day (at bedtime)  -- Indication: For Cerebrovascular accident (CVA), unspecified mechanism    Namzaric 10 mg-7 mg oral capsule, extended release  -- 1 cap(s) by mouth once a day  -- Indication: For Dementia without behavioral disturbance, unspecified dementia type    ciprofloxacin 500 mg oral tablet  -- 1 tab(s) by mouth every 12 hours  -- Indication: For Diarrhea, unspecified type    cyanocobalamin 500 mcg oral tablet  -- 1 tab(s) by mouth once a day  -- Indication: For Dementia without behavioral disturbance, unspecified dementia type I will START or STAY ON the medications listed below when I get home from the hospital:    aspirin 325 mg oral delayed release tablet  -- 1 tab(s) by mouth once a day  -- Indication: For Cerebrovascular accident (CVA), unspecified mechanism    losartan 50 mg oral tablet  -- 1 tab(s) by mouth once a day  -- Indication: For Essential hypertension    atorvastatin 20 mg oral tablet  -- 1 tab(s) by mouth once a day (at bedtime)  -- Indication: For Cerebrovascular accident (CVA), unspecified mechanism    Namzaric 10 mg-7 mg oral capsule, extended release  -- 1 cap(s) by mouth once a day  -- Indication: For Dementia without behavioral disturbance, unspecified dementia type    cyanocobalamin 500 mcg oral tablet  -- 1 tab(s) by mouth once a day  -- Indication: For Dementia without behavioral disturbance, unspecified dementia type

## 2017-06-29 NOTE — ED ADULT NURSE REASSESSMENT NOTE - NIH STROKE SCALE: 5B. MOTOR ARM, RIGHT, QM
(0) No drift; limb holds 90 (or 45) degrees for full 10 secs

## 2017-06-29 NOTE — DISCHARGE NOTE ADULT - CARE PROVIDERS DIRECT ADDRESSES
,DirectAddress_Unknown ,DirectAddress_Unknown,[email or web address],DirectAddress_Unknown

## 2017-06-29 NOTE — ED ADULT NURSE REASSESSMENT NOTE - NIH STROKE SCALE: 4. FACIAL PALSY, QM
(1) Minor paralysis (flattened nasolabial fold, asymmetry on smiling)
(0) Normal symmetrical movements
(0) Normal symmetrical movements
(1) Minor paralysis (flattened nasolabial fold, asymmetry on smiling)

## 2017-06-29 NOTE — OCCUPATIONAL THERAPY INITIAL EVALUATION ADULT - PLANNED THERAPY INTERVENTIONS, OT EVAL
cognitive, visual perceptual/balance training/ADL retraining/bed mobility training/fine motor coordination training/motor coordination training/strengthening/transfer training/neuromuscular re-education

## 2017-06-29 NOTE — PROGRESS NOTE ADULT - SUBJECTIVE AND OBJECTIVE BOX
VEENA BHATT    [id number]    86y      Male    INTERVAL HPI/OVERNIGHT EVENTS: No events on. MRI head resulted with acute infarct of basal ganglia. Currently eating lunch. Offers no complaints. Reported by RN and social work that pt very tearful and depressed after the passing of his wife one month ago. Family requesting psych consult.     Hospital course:  87 yo M with h/o dementia, prostate CA, HTN presented with worsening facial droop, weakness, and unsteadiness for 4 days. In the ED, head CT did not show acute changes. NIHSS 1. . MRI head Acute nonhemorrhagic infarction right basal ganglia. MRA head and neck significant stenosis.       REVIEW OF SYSTEMS:    CONSTITUTIONAL: No fever  CARDIOVASCULAR: No chest pain    Vital Signs Last 24 Hrs  T(C): 36.6 (2017 07:12), Max: 36.6 (2017 15:25)  T(F): 97.8 (2017 07:12), Max: 97.9 (2017 15:25)  HR: 68 (2017 13:02) (56 - 71)  BP: 143/72 (2017 13:02) (105/55 - 143/72)  BP(mean): --  RR: 19 (2017 13:02) (14 - 19)  SpO2: 100% (2017 13:02) (97% - 100%) RA    PHYSICAL EXAM:    GENERAL: NAD, elderly, frail  HEENT: PERRL, +EOMI  NECK: soft, Supple, No JVD,   CHEST/LUNG: Clear to percussion bilaterally   HEART: S1S2+, Regular rate and rhythm   ABDOMEN: Soft, Nontender, Nondistended; Bowel sounds present    LABS:                        13.8   x     )-----------( x        ( 2017 01:22 )             39.5     06-27    136  |  100  |  15.0  ----------------------------<  137<H>  4.0   |  25.0  |  0.91    Ca    8.6      2017 15:10    TPro  6.2<L>  /  Alb  3.6  /  TBili  1.6  /  DBili  x   /  AST  17  /  ALT  10  /  AlkPhos  51        Urinalysis Basic - ( 2017 18:49 )    Color: Yellow / Appearance: Clear / S.015 / pH: x  Gluc: x / Ketone: Trace  / Bili: Negative / Urobili: Negative mg/dL   Blood: x / Protein: Negative mg/dL / Nitrite: Negative   Leuk Esterase: Trace / RBC: 0-2 /HPF / WBC 3-5   Sq Epi: x / Non Sq Epi: x / Bacteria: x          MEDICATIONS  (STANDING):  enoxaparin Injectable 40 milliGRAM(s) SubCutaneous every 24 hours  sodium chloride 0.9% lock flush 3 milliLiter(s) IV Push every 8 hours  losartan 50 milliGRAM(s) Oral daily  metroNIDAZOLE  IVPB   IV Intermittent   metroNIDAZOLE  IVPB 500 milliGRAM(s) IV Intermittent every 8 hours  aspirin enteric coated 325 milliGRAM(s) Oral daily  atorvastatin 20 milliGRAM(s) Oral at bedtime  donepezil 5 milliGRAM(s) Oral at bedtime  memantine 5 milliGRAM(s) Oral daily    MEDICATIONS  (PRN):      RADIOLOGY & ADDITIONAL TESTS:

## 2017-06-29 NOTE — PATIENT PROFILE ADULT. - PRO INTERPRETER NEED 2
*PCF OU; BECOMING VISUALLY SIGNIFICANT BUT NOT BOTHERSOME TO PATIENT AT THIS TIME. CONTINUE TO FOLLOW FOR NOW. OFFER SPEC RX UPDATE. English

## 2017-06-29 NOTE — DISCHARGE NOTE ADULT - PROVIDER TOKENS
FREE:[LAST:[Shorty],PHONE:[(   )    -],FAX:[(   )    -]] FREE:[LAST:[Shorty],PHONE:[(   )    -],FAX:[(   )    -]],TOKEN:'[id number]',TOKEN:'[id number]'

## 2017-06-29 NOTE — DISCHARGE NOTE ADULT - HOSPITAL COURSE
87 yo M with h/o dementia, prostate CA, HTN presented with worsening facial droop, weakness, and unsteadiness for 4 days. In the ED, head CT did not show acute changes. NIHSS 1. . MRI head Acute nonhemorrhagic infarction right basal ganglia. MRA head and neck significant stenosis. Per cardio, no IRINA needed. Echo showed EF 60% and grade I diastolic dysfunction. Physical therapy recommending [facility]J vs. BIU.     Time to discharge: >35 minutes Patient: VEENA BHATT [id number]                 Internal Medicine Hospitalist Discharge Note - Dr. Jose Tyler      Chief Complaint: Patient is a 86y old  Male who presents with a chief complaint of facial droop (2017 05:56)    Hospital course:  85 yo M with h/o dementia, prostate CA, HTN presented with worsening facial droop, weakness, and unsteadiness for 4 days. In the ED, head CT did not show acute changes. NIHSS 1. . MRI head Acute nonhemorrhagic infarction right basal ganglia. MRA head and neck significant stenosis. Per cardio, no IRINA needed. Echo showed EF 60% and grade I diastolic dysfunction. Physical therapy recommending [facility] vs. BIU. PMNR recommend [facility].     Patient denies complaints.        VITALS:  Vital Signs Last 24 Hrs  T(C): 36.2 (2017 07:56), Max: 37 (2017 20:00)  T(F): 97.2 (2017 07:56), Max: 98.6 (2017 20:00)  HR: 72 (2017 07:56) (67 - 77)  BP: 138/79 (2017 05:26) (110/74 - 138/79)  BP(mean): 86 (2017 20:17) (86 - 86)  RR: 14 (2017 07:56) (11 - 18)  SpO2: 99% (2017 20:17) (96% - 99%) Daily     Daily Weight in k.7 (2017 05:26)  CAPILLARY BLOOD GLUCOSE        I&O's Summary      -  2017 07:00  --------------------------------------------------------  IN: 240 mL / OUT: 0 mL / NET: 240 mL    2017 07:  -  2017 10:55  --------------------------------------------------------  IN: 240 mL / OUT: 0 mL / NET: 240 mL        LABS:                        13.9   6.9   )-----------( 270      ( 2017 06:50 )             40.1     07-04    139  |  101  |  16.0  ----------------------------<  103  4.1   |  26.0  |  0.92    Ca    8.7      2017 06:50  Mg     2.0     07-04    TPro  6.0<L>  /  Alb  3.3  /  TBili  1.0  /  DBili  x   /  AST  77<H>  /  ALT  47<H>  /  AlkPhos  51  07-      RECENT CULTURES:      LIVER FUNCTIONS - ( 2017 06:50 )  Alb: 3.3 g/dL / Pro: 6.0 g/dL / ALK PHOS: 51 U/L / ALT: 47 U/L / AST: 77 U/L / GGT: x           85 yo M with h/o dementia, prostate CA, HTN here with acute infarct of right basal ganglia.     Problem/Plan - 1:  ·  Problem: Cerebrovascular accident (CVA), unspecified mechanism.  Plan: MRI head showing acute infarct of right basal ganglia.  C/w asa and statin  NIHSS 1    A1C 5.4  [facility].     Problem/Plan - 2:  ·  Problem: Dementia without behavioral disturbance, unspecified dementia type.  Plan: B12 low - c/w B12  RPR negative  TSH normal  On namzaric.     Problem/Plan - 3:  ·  Problem: Diarrhea, unspecified type.  Plan: C. diff negative  Stool Cultures negative.  D/c antibiotics.C. diff negative  Stool culture pending  Continue Cipro.  Add probiotic.   S/p flagyl.     Problem/Plan - 4:  ·  Problem: Essential hypertension.  Plan: Normotensive  C/w losartan.     Attending Attestation:   Plan d/c to [facility].          Disposition: stable for discharge.  Outpatient followup as above.  Patient was advised to return if they experience any recurrence or worsening of symptoms.  Total time spent on discharge was 35 minutes.  -Jose Tyler D.O., Hospitalist, [facility]

## 2017-06-29 NOTE — ED ADULT NURSE REASSESSMENT NOTE - NIH STROKE SCALE: 1A. LEVEL OF CONSCIOUSNESS, QM
(0) Alert; keenly responsive

## 2017-06-30 LAB
ALBUMIN SERPL ELPH-MCNC: 3.3 G/DL — SIGNIFICANT CHANGE UP (ref 3.3–5.2)
ALP SERPL-CCNC: 48 U/L — SIGNIFICANT CHANGE UP (ref 40–120)
ALT FLD-CCNC: 10 U/L — SIGNIFICANT CHANGE UP
ANION GAP SERPL CALC-SCNC: 12 MMOL/L — SIGNIFICANT CHANGE UP (ref 5–17)
AST SERPL-CCNC: 13 U/L — SIGNIFICANT CHANGE UP
BILIRUB SERPL-MCNC: 1.5 MG/DL — SIGNIFICANT CHANGE UP (ref 0.4–2)
BUN SERPL-MCNC: 14 MG/DL — SIGNIFICANT CHANGE UP (ref 8–20)
CALCIUM SERPL-MCNC: 8.4 MG/DL — LOW (ref 8.6–10.2)
CHLORIDE SERPL-SCNC: 102 MMOL/L — SIGNIFICANT CHANGE UP (ref 98–107)
CO2 SERPL-SCNC: 24 MMOL/L — SIGNIFICANT CHANGE UP (ref 22–29)
CREAT SERPL-MCNC: 0.92 MG/DL — SIGNIFICANT CHANGE UP (ref 0.5–1.3)
GLUCOSE SERPL-MCNC: 100 MG/DL — SIGNIFICANT CHANGE UP (ref 70–115)
HCT VFR BLD CALC: 39.2 % — LOW (ref 42–52)
HGB BLD-MCNC: 13.6 G/DL — LOW (ref 14–18)
MAGNESIUM SERPL-MCNC: 2.1 MG/DL — SIGNIFICANT CHANGE UP (ref 1.6–2.6)
MCHC RBC-ENTMCNC: 30.2 PG — SIGNIFICANT CHANGE UP (ref 27–31)
MCHC RBC-ENTMCNC: 34.7 G/DL — SIGNIFICANT CHANGE UP (ref 32–36)
MCV RBC AUTO: 87.1 FL — SIGNIFICANT CHANGE UP (ref 80–94)
PLATELET # BLD AUTO: 214 K/UL — SIGNIFICANT CHANGE UP (ref 150–400)
POTASSIUM SERPL-MCNC: 3.4 MMOL/L — LOW (ref 3.5–5.3)
POTASSIUM SERPL-SCNC: 3.4 MMOL/L — LOW (ref 3.5–5.3)
PROT SERPL-MCNC: 5.8 G/DL — LOW (ref 6.6–8.7)
RBC # BLD: 4.5 M/UL — LOW (ref 4.6–6.2)
RBC # FLD: 13.2 % — SIGNIFICANT CHANGE UP (ref 11–15.6)
RPR SERPL-ACNC: SIGNIFICANT CHANGE UP
SODIUM SERPL-SCNC: 138 MMOL/L — SIGNIFICANT CHANGE UP (ref 135–145)
WBC # BLD: 6.3 K/UL — SIGNIFICANT CHANGE UP (ref 4.8–10.8)
WBC # FLD AUTO: 6.3 K/UL — SIGNIFICANT CHANGE UP (ref 4.8–10.8)

## 2017-06-30 PROCEDURE — 99222 1ST HOSP IP/OBS MODERATE 55: CPT

## 2017-06-30 PROCEDURE — 99232 SBSQ HOSP IP/OBS MODERATE 35: CPT

## 2017-06-30 RX ORDER — CIPROFLOXACIN LACTATE 400MG/40ML
500 VIAL (ML) INTRAVENOUS EVERY 12 HOURS
Qty: 0 | Refills: 0 | Status: DISCONTINUED | OUTPATIENT
Start: 2017-06-30 | End: 2017-07-05

## 2017-06-30 RX ORDER — PREGABALIN 225 MG/1
500 CAPSULE ORAL DAILY
Qty: 0 | Refills: 0 | Status: DISCONTINUED | OUTPATIENT
Start: 2017-06-30 | End: 2017-06-28

## 2017-06-30 RX ORDER — ATORVASTATIN CALCIUM 80 MG/1
1 TABLET, FILM COATED ORAL
Qty: 0 | Refills: 0 | DISCHARGE
Start: 2017-06-30

## 2017-06-30 RX ORDER — METRONIDAZOLE 500 MG
1 TABLET ORAL
Qty: 0 | Refills: 0 | COMMUNITY
Start: 2017-06-30 | End: 2017-07-04

## 2017-06-30 RX ORDER — METRONIDAZOLE 500 MG
500 TABLET ORAL EVERY 8 HOURS
Qty: 0 | Refills: 0 | Status: COMPLETED | OUTPATIENT
Start: 2017-06-30 | End: 2017-07-03

## 2017-06-30 RX ORDER — ASPIRIN/CALCIUM CARB/MAGNESIUM 324 MG
1 TABLET ORAL
Qty: 0 | Refills: 0 | COMMUNITY
Start: 2017-06-30

## 2017-06-30 RX ORDER — PREGABALIN 225 MG/1
1 CAPSULE ORAL
Qty: 0 | Refills: 0 | COMMUNITY
Start: 2017-06-30

## 2017-06-30 RX ORDER — POTASSIUM CHLORIDE 20 MEQ
40 PACKET (EA) ORAL EVERY 4 HOURS
Qty: 0 | Refills: 0 | Status: COMPLETED | OUTPATIENT
Start: 2017-06-30 | End: 2017-06-30

## 2017-06-30 RX ADMIN — SODIUM CHLORIDE 3 MILLILITER(S): 9 INJECTION INTRAMUSCULAR; INTRAVENOUS; SUBCUTANEOUS at 05:36

## 2017-06-30 RX ADMIN — Medication 500 MILLIGRAM(S): at 22:13

## 2017-06-30 RX ADMIN — Medication 100 MILLIGRAM(S): at 05:36

## 2017-06-30 RX ADMIN — MEMANTINE HYDROCHLORIDE 5 MILLIGRAM(S): 10 TABLET ORAL at 12:32

## 2017-06-30 RX ADMIN — SODIUM CHLORIDE 3 MILLILITER(S): 9 INJECTION INTRAMUSCULAR; INTRAVENOUS; SUBCUTANEOUS at 22:09

## 2017-06-30 RX ADMIN — ATORVASTATIN CALCIUM 20 MILLIGRAM(S): 80 TABLET, FILM COATED ORAL at 22:13

## 2017-06-30 RX ADMIN — ENOXAPARIN SODIUM 40 MILLIGRAM(S): 100 INJECTION SUBCUTANEOUS at 05:43

## 2017-06-30 RX ADMIN — PREGABALIN 500 MICROGRAM(S): 225 CAPSULE ORAL at 14:00

## 2017-06-30 RX ADMIN — Medication 40 MILLIEQUIVALENT(S): at 12:32

## 2017-06-30 RX ADMIN — Medication 500 MILLIGRAM(S): at 14:00

## 2017-06-30 RX ADMIN — DONEPEZIL HYDROCHLORIDE 5 MILLIGRAM(S): 10 TABLET, FILM COATED ORAL at 22:13

## 2017-06-30 RX ADMIN — Medication 40 MILLIEQUIVALENT(S): at 16:00

## 2017-06-30 RX ADMIN — Medication 40 MILLIEQUIVALENT(S): at 22:57

## 2017-06-30 RX ADMIN — Medication 325 MILLIGRAM(S): at 12:32

## 2017-06-30 RX ADMIN — SODIUM CHLORIDE 3 MILLILITER(S): 9 INJECTION INTRAMUSCULAR; INTRAVENOUS; SUBCUTANEOUS at 14:00

## 2017-06-30 NOTE — PROGRESS NOTE ADULT - SUBJECTIVE AND OBJECTIVE BOX
INTERVAL HISTORY:  no complaints. no interval changes      VITAL SIGNS:  Vital Signs Last 24 Hrs  T(C): 37.1 (30 Jun 2017 07:37), Max: 37.1 (30 Jun 2017 04:50)  T(F): 98.7 (30 Jun 2017 07:37), Max: 98.8 (30 Jun 2017 04:50)  HR: 61 (30 Jun 2017 08:00) (61 - 80)  BP: 121/70 (30 Jun 2017 08:00) (91/80 - 143/72)  BP(mean): --  RR: 14 (30 Jun 2017 08:00) (12 - 19)  SpO2: 98% (30 Jun 2017 08:00) (94% - 100%)    PHYSICAL EXAMINATION:    Mentation:  awkae and cooperative. oriented to name only  Language/Speech: nl  CN: left NLF   Visual Fields: full  Motor: 5.5 on right,  4+/5 on left with slight pronator drift  Sensory:  DTR:  Babinski:      MEDS:  MEDICATIONS  (STANDING):  enoxaparin Injectable 40 milliGRAM(s) SubCutaneous every 24 hours  sodium chloride 0.9% lock flush 3 milliLiter(s) IV Push every 8 hours  losartan 50 milliGRAM(s) Oral daily  metroNIDAZOLE  IVPB   IV Intermittent   metroNIDAZOLE  IVPB 500 milliGRAM(s) IV Intermittent every 8 hours  aspirin enteric coated 325 milliGRAM(s) Oral daily  atorvastatin 20 milliGRAM(s) Oral at bedtime  donepezil 5 milliGRAM(s) Oral at bedtime  memantine 5 milliGRAM(s) Oral daily  potassium chloride    Tablet ER 40 milliEquivalent(s) Oral every 4 hours  cyanocobalamin 500 MICROGram(s) Oral daily    MEDICATIONS  (PRN):      LABS:                          13.6   6.3   )-----------( 214      ( 30 Jun 2017 08:16 )             39.2     06-30    138  |  102  |  14.0  ----------------------------<  100  3.4<L>   |  24.0  |  0.92    Ca    8.4<L>      30 Jun 2017 08:16  Mg     2.1     06-30    TPro  5.8<L>  /  Alb  3.3  /  TBili  1.5  /  DBili  x   /  AST  13  /  ALT  10  /  AlkPhos  48  06-30    LIVER FUNCTIONS - ( 30 Jun 2017 08:16 )  Alb: 3.3 g/dL / Pro: 5.8 g/dL / ALK PHOS: 48 U/L / ALT: 10 U/L / AST: 13 U/L / GGT: x               RADIOLOGY & ADDITIONAL STUDIES:    MRI -  acute right basal ganglia infarct  MRA- mild atherosclerosis  TTE- neg    IMPRESSION & PLAN:    1. Right basal ganglia infarct- Hypertensive, cerebrovascular disease  Antiplatelet therapy as prescribed.  Cerebrovascular risk factor assessment and management  Medical and Cardiac evaluation and treatment as indicated    2.  Dementia  Alzeimers, Vascular, low B12  1. Aricept 5mg daily  2. b12 500mcg    Will not actively follow.   Neurologically cleared for discharge/disposition.  Please recontact as needed.  Follow up in office in 2-3 months as instructed.

## 2017-06-30 NOTE — PROGRESS NOTE ADULT - SUBJECTIVE AND OBJECTIVE BOX
VEENA BHATT    [id number]    86y      Male    INTERVAL HPI/OVERNIGHT EVENTS: No events on. Sitting in chair. Feels well.    Hospital course:  85 yo M with h/o dementia, prostate CA, HTN presented with worsening facial droop, weakness, and unsteadiness for 4 days. In the ED, head CT did not show acute changes. NIHSS 1. . MRI head Acute nonhemorrhagic infarction right basal ganglia. MRA head and neck significant stenosis. Per cardio, no IRINA needed. Echo showed EF 60% and grade I diastolic dysfunction. Physical therapy recommending [facility] vs. BIU.     REVIEW OF SYSTEMS:    CONSTITUTIONAL: No fever   RESPIRATORY: No cough, wheezing, hemoptysis; No shortness of breath    Vital Signs Last 24 Hrs  T(C): 37.1 (30 Jun 2017 07:37), Max: 37.1 (30 Jun 2017 04:50)  T(F): 98.7 (30 Jun 2017 07:37), Max: 98.8 (30 Jun 2017 04:50)  HR: 61 (30 Jun 2017 08:00) (61 - 80)  BP: 121/70 (30 Jun 2017 08:00) (91/80 - 143/72)  BP(mean): --  RR: 14 (30 Jun 2017 08:00) (12 - 19)  SpO2: 98% (30 Jun 2017 08:00) (94% - 100%) RA    PHYSICAL EXAM:    GENERAL: NAD, frail, elderly, AAOx2 (self, place)   HEENT: PERRL, +EOMI, MMM  NECK: soft, Supple, No JVD,   CHEST/LUNG: Clear to percussion bilaterally  HEART: S1S2+, Regular rate and rhythm  ABDOMEN: Soft, Nontender, Nondistended; Bowel sounds present      LABS:                        13.6   6.3   )-----------( 214      ( 30 Jun 2017 08:16 )             39.2     06-30    138  |  102  |  14.0  ----------------------------<  100  3.4<L>   |  24.0  |  0.92    Ca    8.4<L>      30 Jun 2017 08:16  Mg     2.1     06-30    TPro  5.8<L>  /  Alb  3.3  /  TBili  1.5  /  DBili  x   /  AST  13  /  ALT  10  /  AlkPhos  48  06-30            MEDICATIONS  (STANDING):  enoxaparin Injectable 40 milliGRAM(s) SubCutaneous every 24 hours  sodium chloride 0.9% lock flush 3 milliLiter(s) IV Push every 8 hours  losartan 50 milliGRAM(s) Oral daily  aspirin enteric coated 325 milliGRAM(s) Oral daily  atorvastatin 20 milliGRAM(s) Oral at bedtime  donepezil 5 milliGRAM(s) Oral at bedtime  memantine 5 milliGRAM(s) Oral daily  potassium chloride    Tablet ER 40 milliEquivalent(s) Oral every 4 hours  cyanocobalamin 500 MICROGram(s) Oral daily  metroNIDAZOLE    Tablet 500 milliGRAM(s) Oral every 8 hours    MEDICATIONS  (PRN):      RADIOLOGY & ADDITIONAL TESTS:

## 2017-06-30 NOTE — CONSULT NOTE ADULT - SUBJECTIVE AND OBJECTIVE BOX
[facility], [facility]                                   [address] [place] [address]                                                      PHONE: [phone]                                                         FAX: [phone]  -------------------------------------------------------------------------------------------------------------------------------    86y Male with past medical history as under presented with worsening facial droop, weakness, and unsteadiness for 4 days. He previously was fully ambulating, was noticed by the family that he has facial droop and looks weak and unsteady 4 days ago. No headache, speech changes, or blurry vision. In the ED, head CT did not show acute changes. MRI head acute nonhemorrhagic infarction right basal ganglia. At the time of evaluation, pt reports no complains.    PAST MEDICAL & SURGICAL HISTORY:  Prostate CA  Memory loss or impairment  HTN (hypertension)  S/P cataract extraction and insertion of intraocular lens, left  S/P cataract extraction and insertion of intraocular lens, right      No Known Allergies      Review of Systems:   Positive for weakness, slurring of speech  Rest of the systems were reviewed and was negative.     Family history reviewed and non-contributory    Social History:  No smoking   No alcohol  No other drug use    Vital Signs Last 24 Hrs  T(C): 37.1 (30 Jun 2017 07:37), Max: 37.1 (30 Jun 2017 04:50)  T(F): 98.7 (30 Jun 2017 07:37), Max: 98.8 (30 Jun 2017 04:50)  HR: 61 (30 Jun 2017 08:00) (61 - 80)  BP: 121/70 (30 Jun 2017 08:00) (91/80 - 143/72)  BP(mean): --  RR: 14 (30 Jun 2017 08:00) (12 - 19)  SpO2: 98% (30 Jun 2017 08:00) (94% - 100%)    PHYSICAL EXAM:  Constitutional: Oriented to time, place and person. Appears fatigued  HEENT:     Conjunctiva normal, Normal oral mucosa, No JVD	  Cardiovascular: Normal S1 S2, No murmurs  Respiratory: Lungs clear to auscultation; no crepitations, no wheeze  Gastrointestinal:  Soft, Non-tender, + BS	  Extremities: No cyanosis, clubbing or edema  Skin: Warm and dry  Neurologic: Unable to assess but grossly no deficits  Psychiatric: affect was flat        LABS:                        13.6   6.3   )-----------( 214      ( 30 Jun 2017 08:16 )             39.2     06-30    138  |  102  |  14.0  ----------------------------<  100  3.4<L>   |  24.0  |  0.92    Ca    8.4<L>      30 Jun 2017 08:16  Mg     2.1     06-30    TPro  5.8<L>  /  Alb  3.3  /  TBili  1.5  /  DBili  x   /  AST  13  /  ALT  10  /  AlkPhos  48  06-30            RADIOLOGY & ADDITIONAL STUDIES:    CARDIOLOGY TESTING:     ECG: NSR      < from: TTE Echo Complete w/Doppler (06.29.17 @ 12:04) >   Summary:   1. Normal biventricular systolic function. Left ventricular ejection   fraction, by visual estimation, is 60 to 65%.   2. Spectral Doppler shows impaired relaxation pattern of left   ventricular myocardial filling (Grade I diastolic dysfunction).   3. No significant valvular abnormalities.   4. There is no evidence of pericardial effusion.   5. ** No prior echocardiograms available for comparison. No evidence for   cardiac source of emboli based on this study.    < end of copied text >    MEDICATIONS:  MEDICATIONS  (STANDING):  enoxaparin Injectable 40 milliGRAM(s) SubCutaneous every 24 hours  sodium chloride 0.9% lock flush 3 milliLiter(s) IV Push every 8 hours  losartan 50 milliGRAM(s) Oral daily  metroNIDAZOLE  IVPB   IV Intermittent   metroNIDAZOLE  IVPB 500 milliGRAM(s) IV Intermittent every 8 hours  aspirin enteric coated 325 milliGRAM(s) Oral daily  atorvastatin 20 milliGRAM(s) Oral at bedtime  donepezil 5 milliGRAM(s) Oral at bedtime  memantine 5 milliGRAM(s) Oral daily  potassium chloride    Tablet ER 40 milliEquivalent(s) Oral every 4 hours  cyanocobalamin 500 MICROGram(s) Oral daily    MEDICATIONS  (PRN):      ASSESSMENT AND PLAN:    85 yo M with h/o dementia, prostate CA, HTN presented with worsening facial droop, weakness, and unsteadiness. MRI head Acute nonhemorrhagic infarction right basal ganglia. MRA head and neck significant stenosis. Echo with preserved LV function. No arrhythmias on telemetry    -  Continue ASA   -  BP control  -  Remains in NSR  -  No further inpt cardiac work-up needed  -  Will arrange outpt [facility]                                           [facility], [facility]                                   [address] [place] [address]                                                      PHONE: [phone]                                                         FAX: [phone]  -------------------------------------------------------------------------------------------------------------------------------    86y Male with past medical history as under presented with worsening facial droop, weakness, and unsteadiness for 4 days. He previously was fully ambulating, was noticed by the family that he has facial droop and looks weak and unsteady 4 days ago. No headache, speech changes, or blurry vision. In the ED, head CT did not show acute changes. MRI head acute nonhemorrhagic infarction in right basal ganglia. At the time of evaluation, pt reports no complains.    PAST MEDICAL & SURGICAL HISTORY:  Prostate CA  Memory loss or impairment  HTN (hypertension)  S/P cataract extraction and insertion of intraocular lens, left  S/P cataract extraction and insertion of intraocular lens, right      No Known Allergies      Review of Systems:   Positive for weakness, slurring of speech  Rest of the systems were reviewed and was negative.     Family history reviewed and non-contributory    Social History:  No smoking   No alcohol  No other drug use    Vital Signs Last 24 Hrs  T(C): 37.1 (30 Jun 2017 07:37), Max: 37.1 (30 Jun 2017 04:50)  T(F): 98.7 (30 Jun 2017 07:37), Max: 98.8 (30 Jun 2017 04:50)  HR: 61 (30 Jun 2017 08:00) (61 - 80)  BP: 121/70 (30 Jun 2017 08:00) (91/80 - 143/72)  BP(mean): --  RR: 14 (30 Jun 2017 08:00) (12 - 19)  SpO2: 98% (30 Jun 2017 08:00) (94% - 100%)    PHYSICAL EXAM:  Constitutional: Oriented to time, place and person. Appears fatigued  HEENT:     Conjunctiva normal, Normal oral mucosa, No JVD	  Cardiovascular: Normal S1 S2, No murmurs  Respiratory: Lungs clear to auscultation; no crepitations, no wheeze  Gastrointestinal:  Soft, Non-tender, + BS	  Extremities: No cyanosis, clubbing or edema  Skin: Warm and dry  Neurologic: Unable to assess but grossly no deficits  Psychiatric: affect was flat        LABS:                        13.6   6.3   )-----------( 214      ( 30 Jun 2017 08:16 )             39.2     06-30    138  |  102  |  14.0  ----------------------------<  100  3.4<L>   |  24.0  |  0.92    Ca    8.4<L>      30 Jun 2017 08:16  Mg     2.1     06-30    TPro  5.8<L>  /  Alb  3.3  /  TBili  1.5  /  DBili  x   /  AST  13  /  ALT  10  /  AlkPhos  48  06-30            RADIOLOGY & ADDITIONAL STUDIES:    CARDIOLOGY TESTING:     ECG: NSR      < from: TTE Echo Complete w/Doppler (06.29.17 @ 12:04) >   Summary:   1. Normal biventricular systolic function. Left ventricular ejection   fraction, by visual estimation, is 60 to 65%.   2. Spectral Doppler shows impaired relaxation pattern of left   ventricular myocardial filling (Grade I diastolic dysfunction).   3. No significant valvular abnormalities.   4. There is no evidence of pericardial effusion.   5. ** No prior echocardiograms available for comparison. No evidence for   cardiac source of emboli based on this study.    < end of copied text >    MEDICATIONS:  MEDICATIONS  (STANDING):  enoxaparin Injectable 40 milliGRAM(s) SubCutaneous every 24 hours  sodium chloride 0.9% lock flush 3 milliLiter(s) IV Push every 8 hours  losartan 50 milliGRAM(s) Oral daily  metroNIDAZOLE  IVPB   IV Intermittent   metroNIDAZOLE  IVPB 500 milliGRAM(s) IV Intermittent every 8 hours  aspirin enteric coated 325 milliGRAM(s) Oral daily  atorvastatin 20 milliGRAM(s) Oral at bedtime  donepezil 5 milliGRAM(s) Oral at bedtime  memantine 5 milliGRAM(s) Oral daily  potassium chloride    Tablet ER 40 milliEquivalent(s) Oral every 4 hours  cyanocobalamin 500 MICROGram(s) Oral daily    MEDICATIONS  (PRN):      ASSESSMENT AND PLAN:    87 yo M with h/o dementia, prostate CA, HTN presented with worsening facial droop, weakness, and unsteadiness. MRI head Acute nonhemorrhagic infarction in right basal ganglia. MRA head and neck significant stenosis. Echo with preserved LV function. No arrhythmias on telemetry    -  Continue ASA   -  BP control  -  Remains in NSR  -  No further inpt cardiac work-up needed  -  Will arrange outpt [facility]

## 2017-06-30 NOTE — CONSULT NOTE ADULT - SUBJECTIVE AND OBJECTIVE BOX
Rehab evaluation requested for 86y old  Male who presents with a chief complaint of facial droop       HPI:  85 y/o male with h/o mild dementia and HTN, prostate cancer, who was previously full ambulating, was noticed by the family that he has facial droop and looks weak and unsteady 4 days ago. Patient also with frequent bowel movements with loose stools for the last2-3 weeks. CT brain shows no acute changes. MRI brain showed acute right basal ganglia infarct. MRA : mild artherosclerosis, TTE: negative       PCP:     PAST MEDICAL & SURGICAL HISTORY:  Prostate CA  Memory loss or impairment  HTN (hypertension)  S/P cataract extraction and insertion of intraocular lens, left  S/P cataract extraction and insertion of intraocular lens, right      FAMILY HISTORY:  No pertinent family history in first degree relatives      SOCIAL HISTORY:  TOBACCO: denies history  ALCOHOL: denies abuse  IVDA: denies history    FUNCTIONAL, ENVIRONMENTAL HISTORY:  LIVES WITH:   HOME LAYOUT:   STAIRS TO ENTER:   STAIRS INSIDE:   FUNCTIONAL HISTORY: independent with ambulation and ADLs    Allergies    No Known Allergies    Intolerances        MEDICATIONS  (STANDING):  enoxaparin Injectable 40 milliGRAM(s) SubCutaneous every 24 hours  sodium chloride 0.9% lock flush 3 milliLiter(s) IV Push every 8 hours  losartan 50 milliGRAM(s) Oral daily  aspirin enteric coated 325 milliGRAM(s) Oral daily  atorvastatin 20 milliGRAM(s) Oral at bedtime  donepezil 5 milliGRAM(s) Oral at bedtime  memantine 5 milliGRAM(s) Oral daily  potassium chloride    Tablet ER 40 milliEquivalent(s) Oral every 4 hours  cyanocobalamin 500 MICROGram(s) Oral daily  metroNIDAZOLE    Tablet 500 milliGRAM(s) Oral every 8 hours    MEDICATIONS  (PRN):      REVIEW OF SYSTEMS:    CONSTITUTIONAL: No fever,  EYES: No eye pain,   RESPIRATORY: No cough,   CARDIOVASCULAR: No chest pain,   GASTROINTESTINAL: No abdominal or epigastric pain.  GENITOURINARY: No dysuria,   NEUROLOGICAL: No headaches,  SKIN: No itching, burning, rashes, or lesions   MUSCULOSKELETAL: No joint pain or swelling;   PSYCHIATRIC: No depression,     Vital Signs Last 24 Hrs  T(C): 37.1 (30 Jun 2017 07:37), Max: 37.1 (30 Jun 2017 04:50)  T(F): 98.7 (30 Jun 2017 07:37), Max: 98.8 (30 Jun 2017 04:50)  HR: 61 (30 Jun 2017 08:00) (61 - 80)  BP: 121/70 (30 Jun 2017 08:00) (91/80 - 121/70)  BP(mean): --  RR: 14 (30 Jun 2017 08:00) (12 - 18)  SpO2: 98% (30 Jun 2017 08:00) (94% - 100%)    PHYSICAL EXAM:    GENERAL: NAD, well-groomed, well-developed  HEAD:  Atraumatic, Normocephalic  EYES: EOMI, PERRLA, conjunctiva and sclera clear  HEART: Regular rate and rhythm; No murmurs, rubs, or gallops  CHEST/LUNG: Clear to auscultation bilaterally;   ABDOMEN: Soft, Nontender, Nondistended; Bowel sounds present  EXTREMITIES:  2+ Peripheral Pulses, No clubbing, cyanosis, or edema  NERVOUS SYSTEM:  Min to moderate assist with therapy.     FUNCTIONAL EXAM:     LABS:                        13.6   6.3   )-----------( 214      ( 30 Jun 2017 08:16 )             39.2     06-30    138  |  102  |  14.0  ----------------------------<  100  3.4<L>   |  24.0  |  0.92    Ca    8.4<L>      30 Jun 2017 08:16  Mg     2.1     06-30    TPro  5.8<L>  /  Alb  3.3  /  TBili  1.5  /  DBili  x   /  AST  13  /  ALT  10  /  AlkPhos  48  06-30          RADIOLOGY & ADDITIONAL STUDIES: Rehab evaluation requested for 86y old  Male who presents with a chief complaint of facial droop       HPI:  85 y/o male with h/o mild dementia and HTN, prostate cancer, who was previously full ambulating, was noticed by the family that he has facial droop and looks weak and unsteady 4 days ago. Patient also with frequent bowel movements with loose stools for the last2-3 weeks. CT brain shows no acute changes. MRI brain showed acute right basal ganglia infarct. MRA : mild artherosclerosis, TTE: negative. rehab evaluation requested for further recommendations in the setting of stroke      PCP: ?    PAST MEDICAL & SURGICAL HISTORY:  Prostate CA  Memory loss or impairment  HTN (hypertension)  S/P cataract extraction and insertion of intraocular lens, left  S/P cataract extraction and insertion of intraocular lens, right      FAMILY HISTORY:  No pertinent family history in first degree relatives      SOCIAL HISTORY: unable to obtain  TOBACCO: denies history  ALCOHOL: denies abuse  IVDA: denies history    FUNCTIONAL, ENVIRONMENTAL HISTORY:  LIVES WITH: ?wife  STAIRS TO ENTER:   STAIRS INSIDE:   FUNCTIONAL HISTORY: independent with ambulation and ADLs    Allergies    No Known Allergies    Intolerances        MEDICATIONS  (STANDING):  enoxaparin Injectable 40 milliGRAM(s) SubCutaneous every 24 hours  sodium chloride 0.9% lock flush 3 milliLiter(s) IV Push every 8 hours  losartan 50 milliGRAM(s) Oral daily  aspirin enteric coated 325 milliGRAM(s) Oral daily  atorvastatin 20 milliGRAM(s) Oral at bedtime  donepezil 5 milliGRAM(s) Oral at bedtime  memantine 5 milliGRAM(s) Oral daily  potassium chloride    Tablet ER 40 milliEquivalent(s) Oral every 4 hours  cyanocobalamin 500 MICROGram(s) Oral daily  metroNIDAZOLE    Tablet 500 milliGRAM(s) Oral every 8 hours    MEDICATIONS  (PRN):      REVIEW OF SYSTEMS:    CONSTITUTIONAL: No fever,  EYES: No eye pain,   RESPIRATORY: No cough,   CARDIOVASCULAR: No chest pain,   GASTROINTESTINAL: No abdominal or epigastric pain.  GENITOURINARY: No dysuria,   NEUROLOGICAL: No headaches,  SKIN: No itching, burning, rashes, or lesions   MUSCULOSKELETAL: No joint pain or swelling;   PSYCHIATRIC: No depression,     Vital Signs Last 24 Hrs  T(C): 37.1 (30 Jun 2017 07:37), Max: 37.1 (30 Jun 2017 04:50)  T(F): 98.7 (30 Jun 2017 07:37), Max: 98.8 (30 Jun 2017 04:50)  HR: 61 (30 Jun 2017 08:00) (61 - 80)  BP: 121/70 (30 Jun 2017 08:00) (91/80 - 121/70)  BP(mean): --  RR: 14 (30 Jun 2017 08:00) (12 - 18)  SpO2: 98% (30 Jun 2017 08:00) (94% - 100%)    PHYSICAL EXAM:    GENERAL: NAD, well-groomed, well-developed  HEAD:  Atraumatic, Normocephalic  EYES: EOMI, PERRLA, conjunctiva and sclera clear  HEART: Regular rate and rhythm; No murmurs, rubs, or gallops  CHEST/LUNG: Clear to auscultation bilaterally;   ABDOMEN: Soft, Nontender, Nondistended; Bowel sounds present  EXTREMITIES:  2+ Peripheral Pulses, No clubbing, cyanosis, or edema  NERVOUS SYSTEM:  Min to moderate assist with therapy.     FUNCTIONAL EXAM:     LABS:                        13.6   6.3   )-----------( 214      ( 30 Jun 2017 08:16 )             39.2     06-30    138  |  102  |  14.0  ----------------------------<  100  3.4<L>   |  24.0  |  0.92    Ca    8.4<L>      30 Jun 2017 08:16  Mg     2.1     06-30    TPro  5.8<L>  /  Alb  3.3  /  TBili  1.5  /  DBili  x   /  AST  13  /  ALT  10  /  AlkPhos  48  06-30          RADIOLOGY & ADDITIONAL STUDIES: Rehab evaluation requested for 86y old  Male who presents with a chief complaint of facial droop . History from chart      HPI:  87 y/o male with h/o mild dementia and HTN, prostate cancer, who was previously full ambulating, was noticed by the family that he has facial droop and looks weak and unsteady 4 days ago. Patient also with frequent bowel movements with loose stools for the last2-3 weeks. CT brain shows no acute changes. MRI brain showed acute right basal ganglia infarct. MRA : mild artherosclerosis, TTE: negative. rehab evaluation requested for further recommendations in the setting of stroke. Patient has been started on aricept for dementia      PCP: ?    PAST MEDICAL & SURGICAL HISTORY:  Prostate CA  Memory loss or impairment  HTN (hypertension)  S/P cataract extraction and insertion of intraocular lens, left  S/P cataract extraction and insertion of intraocular lens, right      FAMILY HISTORY:  No pertinent family history in first degree relatives      SOCIAL HISTORY: unable to obtain  TOBACCO: denies history  ALCOHOL: denies abuse  IVDA: denies history    FUNCTIONAL, ENVIRONMENTAL HISTORY:  LIVES WITH: per chart note, wife is   STAIRS TO ENTER: ?  STAIRS INSIDE:   FUNCTIONAL HISTORY: independent with ambulation and ADLs    Allergies    No Known Allergies    Intolerances        MEDICATIONS  (STANDING):  enoxaparin Injectable 40 milliGRAM(s) SubCutaneous every 24 hours  sodium chloride 0.9% lock flush 3 milliLiter(s) IV Push every 8 hours  losartan 50 milliGRAM(s) Oral daily  aspirin enteric coated 325 milliGRAM(s) Oral daily  atorvastatin 20 milliGRAM(s) Oral at bedtime  donepezil 5 milliGRAM(s) Oral at bedtime  memantine 5 milliGRAM(s) Oral daily  potassium chloride    Tablet ER 40 milliEquivalent(s) Oral every 4 hours  cyanocobalamin 500 MICROGram(s) Oral daily  metroNIDAZOLE    Tablet 500 milliGRAM(s) Oral every 8 hours    MEDICATIONS  (PRN):      REVIEW OF SYSTEMS:limited     CONSTITUTIONAL: No fever,  EYES: No eye pain,   RESPIRATORY: No cough,   CARDIOVASCULAR: No chest pain,   GASTROINTESTINAL: No abdominal pain.  GENITOURINARY: No dysuria,   NEUROLOGICAL: No headaches,  SKIN: No itching, burning, rashes, or lesions   MUSCULOSKELETAL: No joint pain   PSYCHIATRIC: No depression,     Vital Signs Last 24 Hrs  T(C): 37.1 (2017 07:37), Max: 37.1 (2017 04:50)  T(F): 98.7 (2017 07:37), Max: 98.8 (2017 04:50)  HR: 61 (2017 08:00) (61 - 80)  BP: 121/70 (2017 08:00) (91/80 - 121/70)  BP(mean): --  RR: 14 (2017 08:00) (12 - 18)  SpO2: 98% (2017 08:00) (94% - 100%)    PHYSICAL EXAM:    GENERAL: NAD, well-groomed, well-developed  HEAD:  Atraumatic, Normocephalic  EYES: EOMI,   HEART: S1S2+  CHEST/LUNG: Clear to auscultation bilaterally;   ABDOMEN: Soft, Nontender, Nondistended; Bowel sounds present  EXTREMITIES:  No edema or calf tenderness  NERVOUS SYSTEM: alert, awake, oriented to self, place, not to time. Naming intact, affect flat, left facial droop, motor right 5/5, left UE 4/5, LE, 5/5, pronator drift.    FUNCTIONAL EXAM: min to mod assist with therapy    LABS:                        13.6   6.3   )-----------( 214      ( 2017 08:16 )             39.2     06-30    138  |  102  |  14.0  ----------------------------<  100  3.4<L>   |  24.0  |  0.92    Ca    8.4<L>      2017 08:16  Mg     2.1         TPro  5.8<L>  /  Alb  3.3  /  TBili  1.5  /  DBili  x   /  AST  13  /  ALT  10  /  AlkPhos  48      RADIOLOGY & ADDITIONAL STUDIES:    < from: CT Head No Cont (17 @ 19:23) >  EXAM:  CT BRAIN                          PROCEDURE DATE:  2017        INTERPRETATION:  CT BRAIN WITHOUT CONTRAST:    History: Left facial droop for several days.    Date and time of exam: 2017 7:13 PM.    Comparison exam: No prior exam.    Technique: Sections were obtained through the brain without IV contrast.    Findings: The ventricles are of normal size and contour.  No masses are   identified.  There is no shift of midline structures.  No abnormal   calcifications or low dense regions are seen.  There is no evidence of   intracerebral or extra axial bleed. The calvarium demonstrates no   abnormality.  The paranasal sinuses and mastoid air cells are   unremarkable. Diffuse microvascular ischemic disease. Ectasia of the   distal basal artery measuring 6 x 5 mm.    Impression: No evidence of acute intracranial pathology.    Gurpreet NY M.D., ATTENDING RADIOLOGIST  This document has been electronically signed. 2017  7:52PM      < from: MRA Head w/o Cont (17 @ 12:03) >  XAM:  CAROTID   NECK(MRA)W O CON                         EXAM:  HEAD(MRA)W O CON                          PROCEDURE DATE:  2017        INTERPRETATION:  PROCEDURE: MRA of the carotid arteries and intracranial   circulation/[place] without contrast    REASON FOR EXAM: 86-year-old assess for stenosis       TECHNIQUE: Time-of-flight MR angiography of the extra and intracranial   arteries. The study was performed unenhanced.    COMPARISON:   None.     FINDINGS:    EXTRACRANIAL MRA:    RIGHT CAROTID ARTERIES:    There is normal right common carotid artery. Scattered atheromatous   changes at the origin of the right internal carotid artery, without a   hemodynamically significant stenosis.    There is no hemodynamically significant stenosis of the visualized right   cervical internal carotid artery.    Normal origin of the right external carotid artery and normal visualized   proximal branches.      LEFT CAROTID ARTERIES:    Normal left common carotid artery. Scattered atheromatous changes at the   origin of the left internal carotid artery, without a hemodynamically   significant stenosis.    There is no hemodynamically significant stenosis of the visualized   cervical left internal carotid artery.    Normal origin of theleft external carotid artery and normal visualized   proximal branches.    VERTEBRAL ARTERIES:    Normal antegrade flow within the bilateral vertebral artery. The left   vertebral artery is dominant.    [place]/cerebral MRA:    There is normal flow-related enhancement of the bilateral petrous,   cavernous and supraclinoid portions of the internal carotid arteries   bilaterally with a patent supraclinoid bifurcation into anterior and   middle cerebral arteries.    There are patent A1 and A2 segments of the anterior cerebral arteries   bilaterally.        There is a small intact anterior communicating artery (ACOM).      There is normal flow-related signal of the bilateral middle cerebral   arteries and normal MCA bifurcation/trifurcation and visualized distal   branches of the middle cerebral arteries.         The intradural vertebral arteries are of normal caliber. There is normal   vertebral basilar junction and basilar artery with a normal basilar   bifurcation.       There is normal visualized posterior cerebral arteries bilaterally.    There is no demonstrated aneurysm of the [place] or   arteriovascular malformation (AVM).  There is no major vessel occlusion   or hemodynamically significant stenosis.      The visualized brain parenchyma is normal.    IMPRESSION:  Scattered atheromatous changes at the origin of the internal carotid   artery without hemodynamically significant stenosis.    Tortuous intracranial vessels. No evidence of intracranial aneurysm or   hemodynamically significant stenosis of the [place]/intracranial   circulation.     DANSIH KAUR M.D., ATTENDING RADIOLOGIST  This document has been electronically signed. 2017 12:17PM            EXAM:  BRAIN (MRI) W O CON                          PROCEDURE DATE:  2017        INTERPRETATION:      REASON FOR EXAM: 86-year-old man CVA.         TECHNIQUE:   Standardized multiplanar fat and water weighted pulse   sequences were obtained without administration of contrast.      COMPARISON:   Prior study 2017.    FINDINGS:  There is diffusion restriction right basal ganglia consistent with acute   nonhemorrhagic ischemic infarct. There is prominence of the ventricles   and cortical sulci. The midline structures are intact.    There are scattered nonspecific signal abnormality in the subcortical,   periventricular, and deep white matter distribution as well as brainstem   likely related to chronic small vessel ischemic changes.    There is no diffusion abnormality to suggest an acute infarction or other   causes of cytotoxic edema.     There is normal configuration of cerebellar, mid brain, antelmo and medulla   without tonsillar ectopia.    There is no acute hemorrhage or hydrocephalus. No extra-axial collections   are identified.    There is normal sella / parasellar structures, tectum and pineal region.    There is dolichoectasia of the intracranial circulation.       The visualized paranasal sinuses are clear.    The visualized portions of the nasopharynx, mastoids and upper cervical spine are grossly unremarkable.    IMPRESSION:    Acute nonhemorrhagic infarction right basal ganglia. Chronic small vessel ischemic changes.      A/P:    86 year old male with history as stated above, memory impairment/dementia/? depression, now with new right basal ganglia infarct. Left hemiparesis, gait impairment.   Patient may be more appropriate for a subacute rehab program at this time in the setting of memory impairment.   Thank you. d/w CCC  .

## 2017-06-30 NOTE — GOALS OF CARE CONVERSATION - PERSONAL ADVANCE DIRECTIVE - CONVERSATION DETAILS
Son Teodoro reached by phone.  Pt does have HCP at home naming other son Roel.  Requested family to bring in copy to add to chart.  Pt lives with Teodoro.  Pt and wife had resided in [place] prior to both parents getting sick.  Plan is for Pt to be adm to [facility] to regain strength.  Pt is awake but confused.

## 2017-06-30 NOTE — PROGRESS NOTE BEHAVIORAL HEALTH - NSBHFUPINTERVALHXFT_PSY_A_CORE
Patient seen for f/u last night. He did not receive Haldol for agitation last night or today. RN caring for patient reports he slept well last night and has not witnessed any tearful episodes today.  Patient continues to have poor remote and short term memory. He reports his mood is good and denies any thoughts of hurting self or eyes.

## 2017-06-30 NOTE — PROGRESS NOTE BEHAVIORAL HEALTH - SUMMARY
Patient is a 85 y/o  male with two adult children, PMH of HTN, dementia, and prostate ca, admitted to [facility] 6/27/2017 from home for increased confusion, unsteady gait and facial droop, Patient found to have acute nonhemorrhagic infarct to basal ganglia. Patient seen on medicine today, psychiatry consulted for depression  Patient seen and evaluation. Attempted to reach family for collateral. As per Dr Zamora, plan is to eventually discharge to [facility]. Patient presents calm, oriented x1 with impaired memory, attention, concentration. He is tearful when he talks about his family and home. He was seen by neurology and started on cholinesterase inhibitor for demential  Recommend  1. would not start antidepressant in the context of delirium  2. avoid benzodiazepines which can increase confusion  3. Patient should be followed by psychiatry in [facility]  4. psychiatry will follow at [facility] and assess mental status and mood sx.

## 2017-07-01 LAB
ALBUMIN SERPL ELPH-MCNC: 3.5 G/DL — SIGNIFICANT CHANGE UP (ref 3.3–5.2)
ALP SERPL-CCNC: 47 U/L — SIGNIFICANT CHANGE UP (ref 40–120)
ALT FLD-CCNC: 12 U/L — SIGNIFICANT CHANGE UP
ANION GAP SERPL CALC-SCNC: 8 MMOL/L — SIGNIFICANT CHANGE UP (ref 5–17)
AST SERPL-CCNC: 16 U/L — SIGNIFICANT CHANGE UP
BILIRUB SERPL-MCNC: 1.1 MG/DL — SIGNIFICANT CHANGE UP (ref 0.4–2)
BUN SERPL-MCNC: 16 MG/DL — SIGNIFICANT CHANGE UP (ref 8–20)
CALCIUM SERPL-MCNC: 8.9 MG/DL — SIGNIFICANT CHANGE UP (ref 8.6–10.2)
CHLORIDE SERPL-SCNC: 103 MMOL/L — SIGNIFICANT CHANGE UP (ref 98–107)
CO2 SERPL-SCNC: 27 MMOL/L — SIGNIFICANT CHANGE UP (ref 22–29)
CREAT SERPL-MCNC: 0.97 MG/DL — SIGNIFICANT CHANGE UP (ref 0.5–1.3)
CULTURE RESULTS: SIGNIFICANT CHANGE UP
GLUCOSE SERPL-MCNC: 124 MG/DL — HIGH (ref 70–115)
HCT VFR BLD CALC: 39.6 % — LOW (ref 42–52)
HGB BLD-MCNC: 13.6 G/DL — LOW (ref 14–18)
MAGNESIUM SERPL-MCNC: 1.9 MG/DL — SIGNIFICANT CHANGE UP (ref 1.6–2.6)
MCHC RBC-ENTMCNC: 30.4 PG — SIGNIFICANT CHANGE UP (ref 27–31)
MCHC RBC-ENTMCNC: 34.3 G/DL — SIGNIFICANT CHANGE UP (ref 32–36)
MCV RBC AUTO: 88.4 FL — SIGNIFICANT CHANGE UP (ref 80–94)
PLATELET # BLD AUTO: 238 K/UL — SIGNIFICANT CHANGE UP (ref 150–400)
POTASSIUM SERPL-MCNC: 4.6 MMOL/L — SIGNIFICANT CHANGE UP (ref 3.5–5.3)
POTASSIUM SERPL-SCNC: 4.6 MMOL/L — SIGNIFICANT CHANGE UP (ref 3.5–5.3)
PROT SERPL-MCNC: 6 G/DL — LOW (ref 6.6–8.7)
RBC # BLD: 4.48 M/UL — LOW (ref 4.6–6.2)
RBC # FLD: 13.3 % — SIGNIFICANT CHANGE UP (ref 11–15.6)
SODIUM SERPL-SCNC: 138 MMOL/L — SIGNIFICANT CHANGE UP (ref 135–145)
SPECIMEN SOURCE: SIGNIFICANT CHANGE UP
WBC # BLD: 5.9 K/UL — SIGNIFICANT CHANGE UP (ref 4.8–10.8)
WBC # FLD AUTO: 5.9 K/UL — SIGNIFICANT CHANGE UP (ref 4.8–10.8)

## 2017-07-01 PROCEDURE — 99232 SBSQ HOSP IP/OBS MODERATE 35: CPT

## 2017-07-01 RX ORDER — CIPROFLOXACIN LACTATE 400MG/40ML
1 VIAL (ML) INTRAVENOUS
Qty: 0 | Refills: 0 | COMMUNITY
Start: 2017-07-01

## 2017-07-01 RX ADMIN — Medication 500 MILLIGRAM(S): at 21:47

## 2017-07-01 RX ADMIN — Medication 500 MILLIGRAM(S): at 05:45

## 2017-07-01 RX ADMIN — SODIUM CHLORIDE 3 MILLILITER(S): 9 INJECTION INTRAMUSCULAR; INTRAVENOUS; SUBCUTANEOUS at 05:44

## 2017-07-01 RX ADMIN — ENOXAPARIN SODIUM 40 MILLIGRAM(S): 100 INJECTION SUBCUTANEOUS at 05:48

## 2017-07-01 RX ADMIN — PREGABALIN 500 MICROGRAM(S): 225 CAPSULE ORAL at 13:49

## 2017-07-01 RX ADMIN — SODIUM CHLORIDE 3 MILLILITER(S): 9 INJECTION INTRAMUSCULAR; INTRAVENOUS; SUBCUTANEOUS at 13:49

## 2017-07-01 RX ADMIN — Medication 500 MILLIGRAM(S): at 18:31

## 2017-07-01 RX ADMIN — DONEPEZIL HYDROCHLORIDE 5 MILLIGRAM(S): 10 TABLET, FILM COATED ORAL at 21:47

## 2017-07-01 RX ADMIN — SODIUM CHLORIDE 3 MILLILITER(S): 9 INJECTION INTRAMUSCULAR; INTRAVENOUS; SUBCUTANEOUS at 21:43

## 2017-07-01 RX ADMIN — ATORVASTATIN CALCIUM 20 MILLIGRAM(S): 80 TABLET, FILM COATED ORAL at 21:47

## 2017-07-01 RX ADMIN — Medication 500 MILLIGRAM(S): at 14:11

## 2017-07-01 RX ADMIN — Medication 325 MILLIGRAM(S): at 13:48

## 2017-07-01 RX ADMIN — MEMANTINE HYDROCHLORIDE 5 MILLIGRAM(S): 10 TABLET ORAL at 13:49

## 2017-07-01 NOTE — PROGRESS NOTE ADULT - SUBJECTIVE AND OBJECTIVE BOX
VEENA BHATT    [id number]    86y      Male    INTERVAL HPI/OVERNIGHT EVENTS: No events on. Offers no complaints. Asked if he is doing ok, he responds "I think so". Fails to further elaborate.    Hospital course:  87 yo M with h/o dementia, prostate CA, HTN presented with worsening facial droop, weakness, and unsteadiness for 4 days. In the ED, head CT did not show acute changes. NIHSS 1. . MRI head Acute nonhemorrhagic infarction right basal ganglia. MRA head and neck significant stenosis. Per cardio, no IRINA needed. Echo showed EF 60% and grade I diastolic dysfunction. Physical therapy recommending MARJ vs. BIU. PMNR recommend MARJ.     REVIEW OF SYSTEMS:    CONSTITUTIONAL: No fever   CARDIOVASCULAR: No chest pain, palpitations  GASTROINTESTINAL: No abdominal or epigastric pain. No nausea, vomiting      Vital Signs Last 24 Hrs  T(C): 36.4 (01 Jul 2017 05:33), Max: 36.9 (30 Jun 2017 16:00)  T(F): 97.6 (01 Jul 2017 05:33), Max: 98.4 (30 Jun 2017 16:00)  HR: 73 (01 Jul 2017 08:00) (65 - 83)  BP: 121/- (01 Jul 2017 08:00) (108/71 - 133/82)  BP(mean): --  RR: 21 (01 Jul 2017 08:00) (13 - 21)  SpO2: 98% (01 Jul 2017 08:00) (96% - 100%)    PHYSICAL EXAM:    GENERAL: NAD, well-groomed, frail, pleasant, elderly  HEENT: PERRL, +EOMI, MMM  CHEST/LUNG: Clear to percussion bilaterally   HEART: S1S2+, Regular rate and rhythm  ABDOMEN: Soft, Nontender, Nondistended; Bowel sounds present      LABS:                        13.6   6.3   )-----------( 214      ( 30 Jun 2017 08:16 )             39.2     06-30    138  |  102  |  14.0  ----------------------------<  100  3.4<L>   |  24.0  |  0.92    Ca    8.4<L>      30 Jun 2017 08:16  Mg     2.1     06-30    TPro  5.8<L>  /  Alb  3.3  /  TBili  1.5  /  DBili  x   /  AST  13  /  ALT  10  /  AlkPhos  48  06-30            MEDICATIONS  (STANDING):  enoxaparin Injectable 40 milliGRAM(s) SubCutaneous every 24 hours  sodium chloride 0.9% lock flush 3 milliLiter(s) IV Push every 8 hours  losartan 50 milliGRAM(s) Oral daily  aspirin enteric coated 325 milliGRAM(s) Oral daily  atorvastatin 20 milliGRAM(s) Oral at bedtime  donepezil 5 milliGRAM(s) Oral at bedtime  memantine 5 milliGRAM(s) Oral daily  cyanocobalamin 500 MICROGram(s) Oral daily  metroNIDAZOLE    Tablet 500 milliGRAM(s) Oral every 8 hours  ciprofloxacin     Tablet 500 milliGRAM(s) Oral every 12 hours    MEDICATIONS  (PRN):      RADIOLOGY & ADDITIONAL TESTS:

## 2017-07-02 LAB
ALBUMIN SERPL ELPH-MCNC: 3.5 G/DL — SIGNIFICANT CHANGE UP (ref 3.3–5.2)
ALP SERPL-CCNC: 48 U/L — SIGNIFICANT CHANGE UP (ref 40–120)
ALT FLD-CCNC: 24 U/L — SIGNIFICANT CHANGE UP
ANION GAP SERPL CALC-SCNC: 13 MMOL/L — SIGNIFICANT CHANGE UP (ref 5–17)
AST SERPL-CCNC: 44 U/L — HIGH
BILIRUB SERPL-MCNC: 1.1 MG/DL — SIGNIFICANT CHANGE UP (ref 0.4–2)
BUN SERPL-MCNC: 15 MG/DL — SIGNIFICANT CHANGE UP (ref 8–20)
CALCIUM SERPL-MCNC: 8.7 MG/DL — SIGNIFICANT CHANGE UP (ref 8.6–10.2)
CHLORIDE SERPL-SCNC: 101 MMOL/L — SIGNIFICANT CHANGE UP (ref 98–107)
CO2 SERPL-SCNC: 25 MMOL/L — SIGNIFICANT CHANGE UP (ref 22–29)
CREAT SERPL-MCNC: 0.93 MG/DL — SIGNIFICANT CHANGE UP (ref 0.5–1.3)
GLUCOSE SERPL-MCNC: 135 MG/DL — HIGH (ref 70–115)
HCT VFR BLD CALC: 39.1 % — LOW (ref 42–52)
HGB BLD-MCNC: 13.6 G/DL — LOW (ref 14–18)
MAGNESIUM SERPL-MCNC: 1.9 MG/DL — SIGNIFICANT CHANGE UP (ref 1.6–2.6)
MCHC RBC-ENTMCNC: 30.4 PG — SIGNIFICANT CHANGE UP (ref 27–31)
MCHC RBC-ENTMCNC: 34.8 G/DL — SIGNIFICANT CHANGE UP (ref 32–36)
MCV RBC AUTO: 87.5 FL — SIGNIFICANT CHANGE UP (ref 80–94)
PLATELET # BLD AUTO: 245 K/UL — SIGNIFICANT CHANGE UP (ref 150–400)
POTASSIUM SERPL-MCNC: 4.1 MMOL/L — SIGNIFICANT CHANGE UP (ref 3.5–5.3)
POTASSIUM SERPL-SCNC: 4.1 MMOL/L — SIGNIFICANT CHANGE UP (ref 3.5–5.3)
PROT SERPL-MCNC: 6 G/DL — LOW (ref 6.6–8.7)
RBC # BLD: 4.47 M/UL — LOW (ref 4.6–6.2)
RBC # FLD: 13.2 % — SIGNIFICANT CHANGE UP (ref 11–15.6)
SODIUM SERPL-SCNC: 139 MMOL/L — SIGNIFICANT CHANGE UP (ref 135–145)
WBC # BLD: 5.6 K/UL — SIGNIFICANT CHANGE UP (ref 4.8–10.8)
WBC # FLD AUTO: 5.6 K/UL — SIGNIFICANT CHANGE UP (ref 4.8–10.8)

## 2017-07-02 PROCEDURE — 99232 SBSQ HOSP IP/OBS MODERATE 35: CPT

## 2017-07-02 RX ADMIN — Medication 500 MILLIGRAM(S): at 14:00

## 2017-07-02 RX ADMIN — ATORVASTATIN CALCIUM 20 MILLIGRAM(S): 80 TABLET, FILM COATED ORAL at 21:48

## 2017-07-02 RX ADMIN — Medication 500 MILLIGRAM(S): at 05:42

## 2017-07-02 RX ADMIN — SODIUM CHLORIDE 3 MILLILITER(S): 9 INJECTION INTRAMUSCULAR; INTRAVENOUS; SUBCUTANEOUS at 05:41

## 2017-07-02 RX ADMIN — MEMANTINE HYDROCHLORIDE 5 MILLIGRAM(S): 10 TABLET ORAL at 11:09

## 2017-07-02 RX ADMIN — DONEPEZIL HYDROCHLORIDE 5 MILLIGRAM(S): 10 TABLET, FILM COATED ORAL at 21:48

## 2017-07-02 RX ADMIN — PREGABALIN 500 MICROGRAM(S): 225 CAPSULE ORAL at 11:08

## 2017-07-02 RX ADMIN — Medication 500 MILLIGRAM(S): at 21:48

## 2017-07-02 RX ADMIN — SODIUM CHLORIDE 3 MILLILITER(S): 9 INJECTION INTRAMUSCULAR; INTRAVENOUS; SUBCUTANEOUS at 14:00

## 2017-07-02 RX ADMIN — Medication 325 MILLIGRAM(S): at 11:08

## 2017-07-02 RX ADMIN — Medication 500 MILLIGRAM(S): at 17:15

## 2017-07-02 RX ADMIN — ENOXAPARIN SODIUM 40 MILLIGRAM(S): 100 INJECTION SUBCUTANEOUS at 05:44

## 2017-07-02 RX ADMIN — SODIUM CHLORIDE 3 MILLILITER(S): 9 INJECTION INTRAMUSCULAR; INTRAVENOUS; SUBCUTANEOUS at 21:47

## 2017-07-02 NOTE — PROGRESS NOTE ADULT - SUBJECTIVE AND OBJECTIVE BOX
VEENA BHATT    [id number]    86y      Male    INTERVAL HPI/OVERNIGHT EVENTS: No events on. States "I feel fine".    Hospital course:  87 yo M with h/o dementia, prostate CA, HTN presented with worsening facial droop, weakness, and unsteadiness for 4 days. In the ED, head CT did not show acute changes. NIHSS 1. . MRI head Acute nonhemorrhagic infarction right basal ganglia. MRA head and neck significant stenosis. Per cardio, no IRINA needed. Echo showed EF 60% and grade I diastolic dysfunction. Physical therapy recommending MARJ vs. BIU. PMNR recommend MARJ.     REVIEW OF SYSTEMS:    CONSTITUTIONAL: No fever   CARDIOVASCULAR: No chest pain, palpitations    Vital Signs Last 24 Hrs  T(C): 37.1 (02 Jul 2017 08:17), Max: 37.1 (01 Jul 2017 09:34)  T(F): 98.7 (02 Jul 2017 08:17), Max: 98.7 (01 Jul 2017 09:34)  HR: 66 (02 Jul 2017 08:00) (64 - 76)  BP: 108/65 (02 Jul 2017 08:00) (105/70 - 137/84)  BP(mean): --  RR: 19 (02 Jul 2017 08:00) (13 - 19)  SpO2: 76% (02 Jul 2017 08:00) (76% - 98%)    PHYSICAL EXAM:    GENERAL: NAD, frail, elderly, multiple seborrheic keratoses   HEENT: PERRL, +EOMI, MMM  CHEST/LUNG: Clear to percussion bilaterally   HEART: S1S2+, Regular rate and rhythm   ABDOMEN: Soft, Nontender, Nondistended; Bowel sounds present      LABS:                        13.6   5.9   )-----------( 238      ( 01 Jul 2017 08:49 )             39.6     07-01    138  |  103  |  16.0  ----------------------------<  124<H>  4.6   |  27.0  |  0.97    Ca    8.9      01 Jul 2017 08:49  Mg     1.9     07-01    TPro  6.0<L>  /  Alb  3.5  /  TBili  1.1  /  DBili  x   /  AST  16  /  ALT  12  /  AlkPhos  47  07-01            MEDICATIONS  (STANDING):  enoxaparin Injectable 40 milliGRAM(s) SubCutaneous every 24 hours  sodium chloride 0.9% lock flush 3 milliLiter(s) IV Push every 8 hours  losartan 50 milliGRAM(s) Oral daily  aspirin enteric coated 325 milliGRAM(s) Oral daily  atorvastatin 20 milliGRAM(s) Oral at bedtime  donepezil 5 milliGRAM(s) Oral at bedtime  memantine 5 milliGRAM(s) Oral daily  cyanocobalamin 500 MICROGram(s) Oral daily  metroNIDAZOLE    Tablet 500 milliGRAM(s) Oral every 8 hours  ciprofloxacin     Tablet 500 milliGRAM(s) Oral every 12 hours    MEDICATIONS  (PRN):      RADIOLOGY & ADDITIONAL TESTS:

## 2017-07-03 LAB
ALBUMIN SERPL ELPH-MCNC: 3.3 G/DL — SIGNIFICANT CHANGE UP (ref 3.3–5.2)
ALP SERPL-CCNC: 47 U/L — SIGNIFICANT CHANGE UP (ref 40–120)
ALT FLD-CCNC: 34 U/L — SIGNIFICANT CHANGE UP
ANION GAP SERPL CALC-SCNC: 11 MMOL/L — SIGNIFICANT CHANGE UP (ref 5–17)
AST SERPL-CCNC: 56 U/L — HIGH
BILIRUB SERPL-MCNC: 1.1 MG/DL — SIGNIFICANT CHANGE UP (ref 0.4–2)
BUN SERPL-MCNC: 14 MG/DL — SIGNIFICANT CHANGE UP (ref 8–20)
CALCIUM SERPL-MCNC: 8.6 MG/DL — SIGNIFICANT CHANGE UP (ref 8.6–10.2)
CHLORIDE SERPL-SCNC: 101 MMOL/L — SIGNIFICANT CHANGE UP (ref 98–107)
CO2 SERPL-SCNC: 26 MMOL/L — SIGNIFICANT CHANGE UP (ref 22–29)
CREAT SERPL-MCNC: 0.94 MG/DL — SIGNIFICANT CHANGE UP (ref 0.5–1.3)
CULTURE RESULTS: SIGNIFICANT CHANGE UP
GLUCOSE SERPL-MCNC: 108 MG/DL — SIGNIFICANT CHANGE UP (ref 70–115)
HCT VFR BLD CALC: 39.7 % — LOW (ref 42–52)
HGB BLD-MCNC: 14 G/DL — SIGNIFICANT CHANGE UP (ref 14–18)
MAGNESIUM SERPL-MCNC: 2 MG/DL — SIGNIFICANT CHANGE UP (ref 1.6–2.6)
MCHC RBC-ENTMCNC: 30.7 PG — SIGNIFICANT CHANGE UP (ref 27–31)
MCHC RBC-ENTMCNC: 35.3 G/DL — SIGNIFICANT CHANGE UP (ref 32–36)
MCV RBC AUTO: 87.1 FL — SIGNIFICANT CHANGE UP (ref 80–94)
PLATELET # BLD AUTO: 245 K/UL — SIGNIFICANT CHANGE UP (ref 150–400)
POTASSIUM SERPL-MCNC: 3.9 MMOL/L — SIGNIFICANT CHANGE UP (ref 3.5–5.3)
POTASSIUM SERPL-SCNC: 3.9 MMOL/L — SIGNIFICANT CHANGE UP (ref 3.5–5.3)
PROT SERPL-MCNC: 5.9 G/DL — LOW (ref 6.6–8.7)
RBC # BLD: 4.56 M/UL — LOW (ref 4.6–6.2)
RBC # FLD: 13.2 % — SIGNIFICANT CHANGE UP (ref 11–15.6)
SODIUM SERPL-SCNC: 138 MMOL/L — SIGNIFICANT CHANGE UP (ref 135–145)
SPECIMEN SOURCE: SIGNIFICANT CHANGE UP
WBC # BLD: 6 K/UL — SIGNIFICANT CHANGE UP (ref 4.8–10.8)
WBC # FLD AUTO: 6 K/UL — SIGNIFICANT CHANGE UP (ref 4.8–10.8)

## 2017-07-03 PROCEDURE — 99232 SBSQ HOSP IP/OBS MODERATE 35: CPT

## 2017-07-03 RX ADMIN — Medication 500 MILLIGRAM(S): at 17:09

## 2017-07-03 RX ADMIN — DONEPEZIL HYDROCHLORIDE 5 MILLIGRAM(S): 10 TABLET, FILM COATED ORAL at 22:28

## 2017-07-03 RX ADMIN — ENOXAPARIN SODIUM 40 MILLIGRAM(S): 100 INJECTION SUBCUTANEOUS at 04:55

## 2017-07-03 RX ADMIN — SODIUM CHLORIDE 3 MILLILITER(S): 9 INJECTION INTRAMUSCULAR; INTRAVENOUS; SUBCUTANEOUS at 13:49

## 2017-07-03 RX ADMIN — Medication 500 MILLIGRAM(S): at 04:54

## 2017-07-03 RX ADMIN — SODIUM CHLORIDE 3 MILLILITER(S): 9 INJECTION INTRAMUSCULAR; INTRAVENOUS; SUBCUTANEOUS at 04:51

## 2017-07-03 RX ADMIN — MEMANTINE HYDROCHLORIDE 5 MILLIGRAM(S): 10 TABLET ORAL at 11:48

## 2017-07-03 RX ADMIN — Medication 325 MILLIGRAM(S): at 11:48

## 2017-07-03 RX ADMIN — ATORVASTATIN CALCIUM 20 MILLIGRAM(S): 80 TABLET, FILM COATED ORAL at 22:28

## 2017-07-03 RX ADMIN — SODIUM CHLORIDE 3 MILLILITER(S): 9 INJECTION INTRAMUSCULAR; INTRAVENOUS; SUBCUTANEOUS at 21:08

## 2017-07-03 RX ADMIN — PREGABALIN 500 MICROGRAM(S): 225 CAPSULE ORAL at 11:48

## 2017-07-03 NOTE — PROGRESS NOTE ADULT - SUBJECTIVE AND OBJECTIVE BOX
VEENA BHATT    [id number]    86y      Male    INTERVAL HPI/OVERNIGHT EVENTS: No events on. Eating breakfast. Offers no complaints.    Hospital course:  85 yo M with h/o dementia, prostate CA, HTN presented with worsening facial droop, weakness, and unsteadiness for 4 days. In the ED, head CT did not show acute changes. NIHSS 1. . MRI head Acute nonhemorrhagic infarction right basal ganglia. MRA head and neck significant stenosis. Per cardio, no IRINA needed. Echo showed EF 60% and grade I diastolic dysfunction. Physical therapy recommending [facility] vs. [facility]. PMNR recommend [facility].       REVIEW OF SYSTEMS:    CONSTITUTIONAL: No fever   CARDIOVASCULAR: No chest pain     Vital Signs Last 24 Hrs  T(C): 36.7 (03 Jul 2017 04:36), Max: 37.1 (02 Jul 2017 20:00)  T(F): 98.1 (03 Jul 2017 04:36), Max: 98.8 (02 Jul 2017 20:00)  HR: 65 (03 Jul 2017 04:00) (60 - 73)  BP: 118/70 (03 Jul 2017 04:00) (115/78 - 134/84)  BP(mean): --  RR: 18 (03 Jul 2017 04:00) (14 - 20)  SpO2: 93% (03 Jul 2017 04:00) (93% - 100%) RA    PHYSICAL EXAM:    GENERAL: NAD, frail, elderly, multiple seborrheic keratoses   HEENT: PERRL, +EOMI, MMM  NECK: soft, Supple, No JVD,   CHEST/LUNG: Clear to percussion bilaterally   HEART: S1S2+, Regular rate and rhythm   ABDOMEN: Soft, Nontender, Nondistended; Bowel sounds present    LABS:                        13.6   5.6   )-----------( 245      ( 02 Jul 2017 09:33 )             39.1     07-02    139  |  101  |  15.0  ----------------------------<  135<H>  4.1   |  25.0  |  0.93    Ca    8.7      02 Jul 2017 09:33  Mg     1.9     07-02    TPro  6.0<L>  /  Alb  3.5  /  TBili  1.1  /  DBili  x   /  AST  44<H>  /  ALT  24  /  AlkPhos  48  07-02            MEDICATIONS  (STANDING):  enoxaparin Injectable 40 milliGRAM(s) SubCutaneous every 24 hours  sodium chloride 0.9% lock flush 3 milliLiter(s) IV Push every 8 hours  losartan 50 milliGRAM(s) Oral daily  aspirin enteric coated 325 milliGRAM(s) Oral daily  atorvastatin 20 milliGRAM(s) Oral at bedtime  donepezil 5 milliGRAM(s) Oral at bedtime  memantine 5 milliGRAM(s) Oral daily  cyanocobalamin 500 MICROGram(s) Oral daily  ciprofloxacin     Tablet 500 milliGRAM(s) Oral every 12 hours    MEDICATIONS  (PRN):      RADIOLOGY & ADDITIONAL TESTS:

## 2017-07-03 NOTE — PROGRESS NOTE ADULT - PROBLEM SELECTOR PLAN 3
C. diff negative  Stool culture pending  C/w flagyl day 6/7, cipro day 3/7 C. diff negative  Stool culture pending  C/w cipro day 3/7  S/p flagyl

## 2017-07-04 LAB
ALBUMIN SERPL ELPH-MCNC: 3.3 G/DL — SIGNIFICANT CHANGE UP (ref 3.3–5.2)
ALP SERPL-CCNC: 51 U/L — SIGNIFICANT CHANGE UP (ref 40–120)
ALT FLD-CCNC: 47 U/L — HIGH
ANION GAP SERPL CALC-SCNC: 12 MMOL/L — SIGNIFICANT CHANGE UP (ref 5–17)
AST SERPL-CCNC: 77 U/L — HIGH
BILIRUB SERPL-MCNC: 1 MG/DL — SIGNIFICANT CHANGE UP (ref 0.4–2)
BUN SERPL-MCNC: 16 MG/DL — SIGNIFICANT CHANGE UP (ref 8–20)
CALCIUM SERPL-MCNC: 8.7 MG/DL — SIGNIFICANT CHANGE UP (ref 8.6–10.2)
CHLORIDE SERPL-SCNC: 101 MMOL/L — SIGNIFICANT CHANGE UP (ref 98–107)
CO2 SERPL-SCNC: 26 MMOL/L — SIGNIFICANT CHANGE UP (ref 22–29)
CREAT SERPL-MCNC: 0.92 MG/DL — SIGNIFICANT CHANGE UP (ref 0.5–1.3)
GLUCOSE SERPL-MCNC: 103 MG/DL — SIGNIFICANT CHANGE UP (ref 70–115)
HCT VFR BLD CALC: 40.1 % — LOW (ref 42–52)
HGB BLD-MCNC: 13.9 G/DL — LOW (ref 14–18)
MAGNESIUM SERPL-MCNC: 2 MG/DL — SIGNIFICANT CHANGE UP (ref 1.8–2.6)
MCHC RBC-ENTMCNC: 30.3 PG — SIGNIFICANT CHANGE UP (ref 27–31)
MCHC RBC-ENTMCNC: 34.7 G/DL — SIGNIFICANT CHANGE UP (ref 32–36)
MCV RBC AUTO: 87.6 FL — SIGNIFICANT CHANGE UP (ref 80–94)
PLATELET # BLD AUTO: 270 K/UL — SIGNIFICANT CHANGE UP (ref 150–400)
POTASSIUM SERPL-MCNC: 4.1 MMOL/L — SIGNIFICANT CHANGE UP (ref 3.5–5.3)
POTASSIUM SERPL-SCNC: 4.1 MMOL/L — SIGNIFICANT CHANGE UP (ref 3.5–5.3)
PROT SERPL-MCNC: 6 G/DL — LOW (ref 6.6–8.7)
RBC # BLD: 4.58 M/UL — LOW (ref 4.6–6.2)
RBC # FLD: 13.4 % — SIGNIFICANT CHANGE UP (ref 11–15.6)
SODIUM SERPL-SCNC: 139 MMOL/L — SIGNIFICANT CHANGE UP (ref 135–145)
WBC # BLD: 6.9 K/UL — SIGNIFICANT CHANGE UP (ref 4.8–10.8)
WBC # FLD AUTO: 6.9 K/UL — SIGNIFICANT CHANGE UP (ref 4.8–10.8)

## 2017-07-04 PROCEDURE — 99232 SBSQ HOSP IP/OBS MODERATE 35: CPT

## 2017-07-04 RX ORDER — LOSARTAN POTASSIUM 100 MG/1
50 TABLET, FILM COATED ORAL DAILY
Qty: 0 | Refills: 0 | Status: DISCONTINUED | OUTPATIENT
Start: 2017-07-04 | End: 2017-06-28

## 2017-07-04 RX ADMIN — SODIUM CHLORIDE 3 MILLILITER(S): 9 INJECTION INTRAMUSCULAR; INTRAVENOUS; SUBCUTANEOUS at 11:38

## 2017-07-04 RX ADMIN — PREGABALIN 500 MICROGRAM(S): 225 CAPSULE ORAL at 11:37

## 2017-07-04 RX ADMIN — ATORVASTATIN CALCIUM 20 MILLIGRAM(S): 80 TABLET, FILM COATED ORAL at 22:13

## 2017-07-04 RX ADMIN — DONEPEZIL HYDROCHLORIDE 5 MILLIGRAM(S): 10 TABLET, FILM COATED ORAL at 22:13

## 2017-07-04 RX ADMIN — Medication 500 MILLIGRAM(S): at 06:11

## 2017-07-04 RX ADMIN — MEMANTINE HYDROCHLORIDE 5 MILLIGRAM(S): 10 TABLET ORAL at 11:37

## 2017-07-04 RX ADMIN — SODIUM CHLORIDE 3 MILLILITER(S): 9 INJECTION INTRAMUSCULAR; INTRAVENOUS; SUBCUTANEOUS at 06:08

## 2017-07-04 RX ADMIN — SODIUM CHLORIDE 3 MILLILITER(S): 9 INJECTION INTRAMUSCULAR; INTRAVENOUS; SUBCUTANEOUS at 22:13

## 2017-07-04 RX ADMIN — ENOXAPARIN SODIUM 40 MILLIGRAM(S): 100 INJECTION SUBCUTANEOUS at 06:11

## 2017-07-04 RX ADMIN — Medication 325 MILLIGRAM(S): at 11:37

## 2017-07-04 RX ADMIN — Medication 500 MILLIGRAM(S): at 17:07

## 2017-07-04 NOTE — PROGRESS NOTE ADULT - SUBJECTIVE AND OBJECTIVE BOX
VEENA BHATT    [id number]    86y      Male    INTERVAL HPI/OVERNIGHT EVENTS: No events on. Sleeping, and easily arousable to verbal stimuli. Offers no complaints.    Hospital course:  87 yo M with h/o dementia, prostate CA, HTN presented with worsening facial droop, weakness, and unsteadiness for 4 days. In the ED, head CT did not show acute changes. NIHSS 1. . MRI head Acute nonhemorrhagic infarction right basal ganglia. MRA head and neck significant stenosis. Per cardio, no IRINA needed. Echo showed EF 60% and grade I diastolic dysfunction. Physical therapy recommending [facility] vs. [facility]. PMNR recommend [facility].     REVIEW OF SYSTEMS:    CONSTITUTIONAL: No fever  CARDIOVASCULAR: No chest pain, palpitations    Vital Signs Last 24 Hrs  T(C): 36.8 (04 Jul 2017 07:20), Max: 37 (04 Jul 2017 00:00)  T(F): 98.2 (04 Jul 2017 07:20), Max: 98.6 (04 Jul 2017 00:00)  HR: 71 (04 Jul 2017 06:12) (67 - 82)  BP: 113/68 (04 Jul 2017 06:12) (110/65 - 119/80)  BP(mean): 83 (04 Jul 2017 06:12) (77 - 85)  RR: 10 (04 Jul 2017 05:00) (10 - 17)  SpO2: 95% (04 Jul 2017 05:00) (94% - 97%) RA    PHYSICAL EXAM:    GENERAL: NAD, frail, elderly  HEENT: PERRL, +EOMI, MMM  CHEST/LUNG: Clear to percussion bilaterally   HEART: S1S2+, Regular rate and rhythm   ABDOMEN: Soft, Nontender, Nondistended; Bowel sounds present    LABS:                        13.9   6.9   )-----------( 270      ( 04 Jul 2017 06:50 )             40.1     07-04    139  |  101  |  16.0  ----------------------------<  103  4.1   |  26.0  |  0.92    Ca    8.7      04 Jul 2017 06:50  Mg     2.0     07-04    TPro  6.0<L>  /  Alb  3.3  /  TBili  1.0  /  DBili  x   /  AST  77<H>  /  ALT  47<H>  /  AlkPhos  51  07-04            MEDICATIONS  (STANDING):  enoxaparin Injectable 40 milliGRAM(s) SubCutaneous every 24 hours  sodium chloride 0.9% lock flush 3 milliLiter(s) IV Push every 8 hours  losartan 50 milliGRAM(s) Oral daily  aspirin enteric coated 325 milliGRAM(s) Oral daily  atorvastatin 20 milliGRAM(s) Oral at bedtime  donepezil 5 milliGRAM(s) Oral at bedtime  memantine 5 milliGRAM(s) Oral daily  cyanocobalamin 500 MICROGram(s) Oral daily  ciprofloxacin     Tablet 500 milliGRAM(s) Oral every 12 hours    MEDICATIONS  (PRN):      RADIOLOGY & ADDITIONAL TESTS:

## 2017-07-05 VITALS — RESPIRATION RATE: 14 BRPM | TEMPERATURE: 97 F | HEART RATE: 72 BPM

## 2017-07-05 LAB
CULTURE RESULTS: SIGNIFICANT CHANGE UP
SPECIMEN SOURCE: SIGNIFICANT CHANGE UP

## 2017-07-05 PROCEDURE — 99239 HOSP IP/OBS DSCHRG MGMT >30: CPT

## 2017-07-05 RX ADMIN — SODIUM CHLORIDE 3 MILLILITER(S): 9 INJECTION INTRAMUSCULAR; INTRAVENOUS; SUBCUTANEOUS at 05:53

## 2017-07-05 RX ADMIN — Medication 500 MILLIGRAM(S): at 05:55

## 2017-07-05 RX ADMIN — PREGABALIN 500 MICROGRAM(S): 225 CAPSULE ORAL at 11:47

## 2017-07-05 RX ADMIN — ENOXAPARIN SODIUM 40 MILLIGRAM(S): 100 INJECTION SUBCUTANEOUS at 05:55

## 2017-07-05 RX ADMIN — Medication 325 MILLIGRAM(S): at 11:49

## 2017-07-05 RX ADMIN — LOSARTAN POTASSIUM 50 MILLIGRAM(S): 100 TABLET, FILM COATED ORAL at 05:55

## 2017-07-05 RX ADMIN — MEMANTINE HYDROCHLORIDE 5 MILLIGRAM(S): 10 TABLET ORAL at 11:48

## 2017-07-05 NOTE — PROGRESS NOTE ADULT - PROBLEM SELECTOR PROBLEM 3
Diarrhea, unspecified type

## 2017-07-05 NOTE — PROGRESS NOTE ADULT - PROBLEM SELECTOR PLAN 3
C. diff negative  Stool culture pending  Continue Cipro.  Add probiotic.   S/p flagyl C. diff negative  Stool Cultures negative.  D/c antibiotics.

## 2017-07-05 NOTE — PROGRESS NOTE ADULT - PROBLEM SELECTOR PROBLEM 4
Essential hypertension

## 2017-07-05 NOTE — PROGRESS NOTE ADULT - SUBJECTIVE AND OBJECTIVE BOX
Patient: VEENA BHATT [id number] 86y Male                 Internal Medicine Hospitalist Progress Note - Dr. Jose Tyler    Chief Complaint: Patient is a 86y old  Male who presents with a chief complaint of facial droop (2017 05:56)    Hospital course:  85 yo M with h/o dementia, prostate CA, HTN presented with worsening facial droop, weakness, and unsteadiness for 4 days. In the ED, head CT did not show acute changes. NIHSS 1. . MRI head Acute nonhemorrhagic infarction right basal ganglia. MRA head and neck significant stenosis. Per cardio, no IRINA needed. Echo showed EF 60% and grade I diastolic dysfunction. Physical therapy recommending [facility] vs. [facility]. PMNR recommend [facility].     Patient denies complaints.      ____________________PHYSICAL EXAM:  Vitals reviewed as indicated below  GENERAL:  NAD  HEENT: NCAT  CARDIOVASCULAR:  S1, S2  LUNGS: CTAB  ABDOMEN:  soft, (-) tenderness, (-) distension, (+) bowel sounds, (-) guarding, (-) rebound (-) rigidity  EXTREMITIES:  no cyanosis / clubbing / edema.   ____________________    BACKGROUND:  HEALTH ISSUES - PROBLEM Dx:  Dementia  Dementia without behavioral disturbance, unspecified dementia type: Dementia without behavioral disturbance, unspecified dementia type  Essential hypertension: Essential hypertension  Diarrhea, unspecified type: Diarrhea, unspecified type  Cerebrovascular accident (CVA), unspecified mechanism: Cerebrovascular accident (CVA), unspecified mechanism        MEDICATIONS  (STANDING):  enoxaparin Injectable 40 milliGRAM(s) SubCutaneous every 24 hours  sodium chloride 0.9% lock flush 3 milliLiter(s) IV Push every 8 hours  aspirin enteric coated 325 milliGRAM(s) Oral daily  atorvastatin 20 milliGRAM(s) Oral at bedtime  donepezil 5 milliGRAM(s) Oral at bedtime  memantine 5 milliGRAM(s) Oral daily  cyanocobalamin 500 MICROGram(s) Oral daily  ciprofloxacin     Tablet 500 milliGRAM(s) Oral every 12 hours  losartan 50 milliGRAM(s) Oral daily    MEDICATIONS  (PRN):    Allergies    No Known Allergies    Intolerances      PAST MEDICAL & SURGICAL HISTORY:  Prostate CA  Memory loss or impairment  HTN (hypertension)  S/P cataract extraction and insertion of intraocular lens, left  S/P cataract extraction and insertion of intraocular lens, right      VITALS:  Vital Signs Last 24 Hrs  T(C): 36.2 (2017 07:56), Max: 37 (2017 20:00)  T(F): 97.2 (2017 07:56), Max: 98.6 (2017 20:00)  HR: 72 (2017 07:56) (67 - 77)  BP: 138/79 (2017 05:26) (110/74 - 138/79)  BP(mean): 86 (2017 20:17) (86 - 86)  RR: 14 (2017 07:56) (11 - 18)  SpO2: 99% (2017 20:17) (96% - 99%) Daily     Daily Weight in k.7 (2017 05:26)  CAPILLARY BLOOD GLUCOSE        I&O's Summary    2017 07:01  -  2017 07:00  --------------------------------------------------------  IN: 240 mL / OUT: 0 mL / NET: 240 mL        LABS:                        13.9   6.9   )-----------( 270      ( 2017 06:50 )             40.1     07-04    139  |  101  |  16.0  ----------------------------<  103  4.1   |  26.0  |  0.92    Ca    8.7      2017 06:50  Mg     2.0     -04    TPro  6.0<L>  /  Alb  3.3  /  TBili  1.0  /  DBili  x   /  AST  77<H>  /  ALT  47<H>  /  AlkPhos  51  07-04      RECENT CULTURES:      LIVER FUNCTIONS - ( 2017 06:50 )  Alb: 3.3 g/dL / Pro: 6.0 g/dL / ALK PHOS: 51 U/L / ALT: 47 U/L / AST: 77 U/L / GGT: x

## 2017-07-05 NOTE — PROGRESS NOTE ADULT - PROBLEM SELECTOR PROBLEM 1
Cerebrovascular accident (CVA), unspecified mechanism

## 2017-07-05 NOTE — PROGRESS NOTE ADULT - PROBLEM SELECTOR PLAN 4
Normotensive  C/w losartan

## 2017-07-05 NOTE — PROGRESS NOTE ADULT - PROBLEM SELECTOR PLAN 2
B12 low - c/w B12  RPR negative  TSH normal  On namzaric
TSH, B12, RPR pending  On namzaric
TSH, B12, RPR pending  On namzaric

## 2017-07-05 NOTE — PROGRESS NOTE ADULT - PROBLEM SELECTOR PLAN 1
MRI head showing acute infarct of right basal ganglia.  C/w asa and statin  NIHSS 1    A1C 5.4  [facility] to [facility]
MRI head showing acute infarct of right basal ganglia.  Echo pending.   Stroke protocol  Neuro following  C/w asa and statin  NIHSS 1    A1C 5.4
MRI head showing acute infarct of right basal ganglia.  Echo reviewed. Appreciate cardiology consult.   Stroke protocol  Neuro cs  C/w asa and statin  NIHSS 1    A1C 5.4  [facility] to [facility]
MRI head showing acute infarct of right basal ganglia.  Echo reviewed. Appreciate cardiology consult.   Stroke protocol  Neuro following  C/w asa and statin  NIHSS 1    A1C 5.4  PMNR pending for BI[facility].
MRI and MRA brain pending  Stroke protocol  Neuro following  C/w asa and statin  NIHSS 1    A1C 5.4

## 2017-07-05 NOTE — PROGRESS NOTE ADULT - PROBLEM SELECTOR PROBLEM 2
Dementia without behavioral disturbance, unspecified dementia type

## 2017-07-06 LAB
ANION GAP SERPL CALC-SCNC: 14 MMOL/L — SIGNIFICANT CHANGE UP (ref 5–17)
BASOPHILS # BLD AUTO: 0.04 K/UL — SIGNIFICANT CHANGE UP (ref 0–0.2)
BASOPHILS NFR BLD AUTO: 0.7 % — SIGNIFICANT CHANGE UP (ref 0–2)
BUN SERPL-MCNC: 17 MG/DL — SIGNIFICANT CHANGE UP (ref 7–23)
CALCIUM SERPL-MCNC: 8.3 MG/DL — LOW (ref 8.4–10.5)
CHLORIDE SERPL-SCNC: 101 MMOL/L — SIGNIFICANT CHANGE UP (ref 96–108)
CO2 SERPL-SCNC: 24 MMOL/L — SIGNIFICANT CHANGE UP (ref 22–31)
CREAT SERPL-MCNC: 1.02 MG/DL — SIGNIFICANT CHANGE UP (ref 0.5–1.3)
EOSINOPHIL # BLD AUTO: 0.33 K/UL — SIGNIFICANT CHANGE UP (ref 0–0.5)
EOSINOPHIL NFR BLD AUTO: 5.4 % — SIGNIFICANT CHANGE UP (ref 0–6)
GLUCOSE SERPL-MCNC: 92 MG/DL — SIGNIFICANT CHANGE UP (ref 70–99)
HCT VFR BLD CALC: 39.7 % — SIGNIFICANT CHANGE UP (ref 39–50)
HGB BLD-MCNC: 13.5 G/DL — SIGNIFICANT CHANGE UP (ref 13–17)
IMM GRANULOCYTES NFR BLD AUTO: 0.2 % — SIGNIFICANT CHANGE UP (ref 0–1.5)
LYMPHOCYTES # BLD AUTO: 1.19 K/UL — SIGNIFICANT CHANGE UP (ref 1–3.3)
LYMPHOCYTES # BLD AUTO: 19.5 % — SIGNIFICANT CHANGE UP (ref 13–44)
MCHC RBC-ENTMCNC: 29.9 PG — SIGNIFICANT CHANGE UP (ref 27–34)
MCHC RBC-ENTMCNC: 34 GM/DL — SIGNIFICANT CHANGE UP (ref 32–36)
MCV RBC AUTO: 87.8 FL — SIGNIFICANT CHANGE UP (ref 80–100)
MONOCYTES # BLD AUTO: 0.65 K/UL — SIGNIFICANT CHANGE UP (ref 0–0.9)
MONOCYTES NFR BLD AUTO: 10.6 % — SIGNIFICANT CHANGE UP (ref 2–14)
NEUTROPHILS # BLD AUTO: 3.89 K/UL — SIGNIFICANT CHANGE UP (ref 1.8–7.4)
NEUTROPHILS NFR BLD AUTO: 63.6 % — SIGNIFICANT CHANGE UP (ref 43–77)
PLATELET # BLD AUTO: 266 K/UL — SIGNIFICANT CHANGE UP (ref 150–400)
POTASSIUM SERPL-MCNC: 4.4 MMOL/L — SIGNIFICANT CHANGE UP (ref 3.5–5.3)
POTASSIUM SERPL-SCNC: 4.4 MMOL/L — SIGNIFICANT CHANGE UP (ref 3.5–5.3)
RBC # BLD: 4.52 M/UL — SIGNIFICANT CHANGE UP (ref 4.2–5.8)
RBC # FLD: 13.1 % — SIGNIFICANT CHANGE UP (ref 10.3–14.5)
SODIUM SERPL-SCNC: 139 MMOL/L — SIGNIFICANT CHANGE UP (ref 135–145)
WBC # BLD: 6.11 K/UL — SIGNIFICANT CHANGE UP (ref 3.8–10.5)
WBC # FLD AUTO: 6.11 K/UL — SIGNIFICANT CHANGE UP (ref 3.8–10.5)

## 2017-07-06 PROCEDURE — 81001 URINALYSIS AUTO W/SCOPE: CPT

## 2017-07-06 PROCEDURE — 80048 BASIC METABOLIC PNL TOTAL CA: CPT

## 2017-07-06 PROCEDURE — 87046 STOOL CULTR AEROBIC BACT EA: CPT

## 2017-07-06 PROCEDURE — 70551 MRI BRAIN STEM W/O DYE: CPT

## 2017-07-06 PROCEDURE — 70544 MR ANGIOGRAPHY HEAD W/O DYE: CPT

## 2017-07-06 PROCEDURE — 80061 LIPID PANEL: CPT

## 2017-07-06 PROCEDURE — 85018 HEMOGLOBIN: CPT

## 2017-07-06 PROCEDURE — 84484 ASSAY OF TROPONIN QUANT: CPT

## 2017-07-06 PROCEDURE — 83036 HEMOGLOBIN GLYCOSYLATED A1C: CPT

## 2017-07-06 PROCEDURE — 97163 PT EVAL HIGH COMPLEX 45 MIN: CPT

## 2017-07-06 PROCEDURE — 93005 ELECTROCARDIOGRAM TRACING: CPT

## 2017-07-06 PROCEDURE — 83735 ASSAY OF MAGNESIUM: CPT

## 2017-07-06 PROCEDURE — 71045 X-RAY EXAM CHEST 1 VIEW: CPT

## 2017-07-06 PROCEDURE — 82272 OCCULT BLD FECES 1-3 TESTS: CPT

## 2017-07-06 PROCEDURE — 82607 VITAMIN B-12: CPT

## 2017-07-06 PROCEDURE — 36415 COLL VENOUS BLD VENIPUNCTURE: CPT

## 2017-07-06 PROCEDURE — 84443 ASSAY THYROID STIM HORMONE: CPT

## 2017-07-06 PROCEDURE — 85027 COMPLETE CBC AUTOMATED: CPT

## 2017-07-06 PROCEDURE — 97167 OT EVAL HIGH COMPLEX 60 MIN: CPT

## 2017-07-06 PROCEDURE — 70547 MR ANGIOGRAPHY NECK W/O DYE: CPT

## 2017-07-06 PROCEDURE — 97110 THERAPEUTIC EXERCISES: CPT

## 2017-07-06 PROCEDURE — 87493 C DIFF AMPLIFIED PROBE: CPT

## 2017-07-06 PROCEDURE — 87045 FECES CULTURE AEROBIC BACT: CPT

## 2017-07-06 PROCEDURE — 99285 EMERGENCY DEPT VISIT HI MDM: CPT | Mod: 25

## 2017-07-06 PROCEDURE — 87177 OVA AND PARASITES SMEARS: CPT

## 2017-07-06 PROCEDURE — 70450 CT HEAD/BRAIN W/O DYE: CPT

## 2017-07-06 PROCEDURE — 97116 GAIT TRAINING THERAPY: CPT

## 2017-07-06 PROCEDURE — 86592 SYPHILIS TEST NON-TREP QUAL: CPT

## 2017-07-06 PROCEDURE — 93306 TTE W/DOPPLER COMPLETE: CPT

## 2017-07-06 PROCEDURE — 80053 COMPREHEN METABOLIC PANEL: CPT

## 2017-09-26 NOTE — H&P ADULT - CLICK TO LAUNCH ORM
BREASTFEEDING SERVICES NOTE:    Time spent with mother/baby: 2 minutes.    Lactation Note: Mother and father state have not pumped due to mother's pain. Encouraged to pump as she can to establish milk supply. Father states they understand how to operate pump, no questions. Declines need for follow up tomorrow, aware may request LC as needed before discharge.     Mom will call for additional visits or concerns    
BREASTFEEDING SERVICES NOTE:    Time spent with mother/baby: 3 minutes.    Mother has been giving bottles.No breastfeeding attempts in the last 24 hours. Mother states she will breastfeed when she gets home. Mother states her pain has been decreased and it is hard for her to do much. Reviewed the importance of early and frequent milk removal in regards to milk supply. Mother denies pumping in the last 24 hours as well. Offered feeding assistance or pumping assistance. Mother declines. Mother states she will start pumping again once she is \"feeling better.\"   Encouraged 8-12 feeding/pumping sessions in 24 hours.  Encouraged to call for feeding assistance if needed.  At a 2.56% weight loss today.    Lactation will continue to follow.      
.

## 2017-10-17 NOTE — PHYSICAL THERAPY INITIAL EVALUATION ADULT - PERTINENT HX OF CURRENT PROBLEM, REHAB EVAL
Have initiated a metabolic evaluation for her bilateral renal lithiasis. The pain in her back and abdomen is not consistent with renal colic and there is no evidence of obstructing stones on her recent CT studies. r/o CVA

## 2017-10-31 PROBLEM — I10 ESSENTIAL (PRIMARY) HYPERTENSION: Chronic | Status: ACTIVE | Noted: 2017-06-27

## 2017-10-31 PROBLEM — R41.3 OTHER AMNESIA: Chronic | Status: ACTIVE | Noted: 2017-06-27

## 2017-10-31 PROBLEM — C61 MALIGNANT NEOPLASM OF PROSTATE: Chronic | Status: ACTIVE | Noted: 2017-06-27

## 2017-11-01 ENCOUNTER — APPOINTMENT (OUTPATIENT)
Dept: COLORECTAL SURGERY | Facility: CLINIC | Age: 82
End: 2017-11-01
Payer: MEDICARE

## 2017-11-01 VITALS
BODY MASS INDEX: 22.73 KG/M2 | TEMPERATURE: 98.1 F | HEIGHT: 68 IN | HEART RATE: 74 BPM | SYSTOLIC BLOOD PRESSURE: 126 MMHG | DIASTOLIC BLOOD PRESSURE: 84 MMHG | WEIGHT: 150 LBS

## 2017-11-01 DIAGNOSIS — Z85.46 PERSONAL HISTORY OF MALIGNANT NEOPLASM OF PROSTATE: ICD-10-CM

## 2017-11-01 PROBLEM — Z00.00 ENCOUNTER FOR PREVENTIVE HEALTH EXAMINATION: Status: ACTIVE | Noted: 2017-11-01

## 2017-11-01 PROCEDURE — 99205 OFFICE O/P NEW HI 60 MIN: CPT

## 2017-11-01 RX ORDER — DONEPEZIL HYDROCHLORIDE 10 MG/1
10 TABLET ORAL
Qty: 30 | Refills: 0 | Status: ACTIVE | COMMUNITY
Start: 2017-10-02

## 2017-11-01 RX ORDER — ESCITALOPRAM OXALATE 5 MG/1
5 TABLET ORAL
Qty: 30 | Refills: 0 | Status: ACTIVE | COMMUNITY
Start: 2017-08-02

## 2017-11-01 RX ORDER — ASPIRIN 325 MG/1
325 TABLET ORAL
Qty: 30 | Refills: 0 | Status: ACTIVE | COMMUNITY
Start: 2017-07-25

## 2017-11-01 RX ORDER — ATORVASTATIN CALCIUM 20 MG/1
20 TABLET, FILM COATED ORAL
Qty: 30 | Refills: 0 | Status: ACTIVE | COMMUNITY
Start: 2017-10-13

## 2017-11-01 RX ORDER — LOPERAMIDE HYDROCHLORIDE 2 MG/1
2 TABLET ORAL
Qty: 10 | Refills: 0 | Status: ACTIVE | COMMUNITY
Start: 2017-08-24

## 2017-11-01 RX ORDER — MEMANTINE HYDROCHLORIDE 5 MG/1
5 TABLET, FILM COATED ORAL
Qty: 60 | Refills: 0 | Status: ACTIVE | COMMUNITY
Start: 2017-10-04

## 2017-11-02 ENCOUNTER — OUTPATIENT (OUTPATIENT)
Dept: OUTPATIENT SERVICES | Facility: HOSPITAL | Age: 82
LOS: 1 days | Discharge: HOME | End: 2017-11-02
Payer: MEDICARE

## 2017-11-02 DIAGNOSIS — C18.9 MALIGNANT NEOPLASM OF COLON, UNSPECIFIED: ICD-10-CM

## 2017-11-02 DIAGNOSIS — Z98.42 CATARACT EXTRACTION STATUS, LEFT EYE: Chronic | ICD-10-CM

## 2017-11-02 DIAGNOSIS — Z98.41 CATARACT EXTRACTION STATUS, RIGHT EYE: Chronic | ICD-10-CM

## 2017-11-07 ENCOUNTER — OUTPATIENT (OUTPATIENT)
Dept: OUTPATIENT SERVICES | Facility: HOSPITAL | Age: 82
LOS: 1 days | Discharge: HOME | End: 2017-11-07

## 2017-11-07 DIAGNOSIS — Z98.42 CATARACT EXTRACTION STATUS, LEFT EYE: Chronic | ICD-10-CM

## 2017-11-07 DIAGNOSIS — Z63.4 DISAPPEARANCE AND DEATH OF FAMILY MEMBER: ICD-10-CM

## 2017-11-07 DIAGNOSIS — Z98.41 CATARACT EXTRACTION STATUS, RIGHT EYE: Chronic | ICD-10-CM

## 2017-11-07 DIAGNOSIS — Z87.891 PERSONAL HISTORY OF NICOTINE DEPENDENCE: ICD-10-CM

## 2017-11-07 DIAGNOSIS — Z86.59 PERSONAL HISTORY OF OTHER MENTAL AND BEHAVIORAL DISORDERS: ICD-10-CM

## 2017-11-07 DIAGNOSIS — Z86.39 PERSONAL HISTORY OF OTHER ENDOCRINE, NUTRITIONAL AND METABOLIC DISEASE: ICD-10-CM

## 2017-11-07 SDOH — SOCIAL STABILITY - SOCIAL INSECURITY: DISSAPEARANCE AND DEATH OF FAMILY MEMBER: Z63.4

## 2017-11-08 ENCOUNTER — RESULT REVIEW (OUTPATIENT)
Age: 82
End: 2017-11-08

## 2017-11-08 ENCOUNTER — APPOINTMENT (OUTPATIENT)
Dept: HEMATOLOGY ONCOLOGY | Facility: CLINIC | Age: 82
End: 2017-11-08
Payer: MEDICARE

## 2017-11-08 ENCOUNTER — APPOINTMENT (OUTPATIENT)
Dept: RADIATION ONCOLOGY | Facility: CLINIC | Age: 82
End: 2017-11-08
Payer: MEDICARE

## 2017-11-08 VITALS
WEIGHT: 158 LBS | TEMPERATURE: 97.8 F | HEART RATE: 62 BPM | HEIGHT: 68 IN | OXYGEN SATURATION: 97 % | BODY MASS INDEX: 23.95 KG/M2 | SYSTOLIC BLOOD PRESSURE: 125 MMHG | DIASTOLIC BLOOD PRESSURE: 80 MMHG | RESPIRATION RATE: 16 BRPM

## 2017-11-08 VITALS
WEIGHT: 157.08 LBS | SYSTOLIC BLOOD PRESSURE: 154 MMHG | OXYGEN SATURATION: 98 % | TEMPERATURE: 97.5 F | HEART RATE: 66 BPM | DIASTOLIC BLOOD PRESSURE: 80 MMHG | HEIGHT: 67.44 IN | BODY MASS INDEX: 24.37 KG/M2

## 2017-11-08 DIAGNOSIS — Z85.048 PERSONAL HISTORY OF OTHER MALIGNANT NEOPLASM OF RECTUM, RECTOSIGMOID JUNCTION, AND ANUS: ICD-10-CM

## 2017-11-08 DIAGNOSIS — Z82.49 FAMILY HISTORY OF ISCHEMIC HEART DISEASE AND OTHER DISEASES OF THE CIRCULATORY SYSTEM: ICD-10-CM

## 2017-11-08 DIAGNOSIS — Z86.73 PERSONAL HISTORY OF TRANSIENT ISCHEMIC ATTACK (TIA), AND CEREBRAL INFARCTION W/OUT RESIDUAL DEFICITS: ICD-10-CM

## 2017-11-08 DIAGNOSIS — Z87.19 PERSONAL HISTORY OF OTHER DISEASES OF THE DIGESTIVE SYSTEM: ICD-10-CM

## 2017-11-08 DIAGNOSIS — Z86.59 PERSONAL HISTORY OF OTHER MENTAL AND BEHAVIORAL DISORDERS: ICD-10-CM

## 2017-11-08 DIAGNOSIS — Z86.79 PERSONAL HISTORY OF OTHER DISEASES OF THE CIRCULATORY SYSTEM: ICD-10-CM

## 2017-11-08 DIAGNOSIS — Z78.9 OTHER SPECIFIED HEALTH STATUS: ICD-10-CM

## 2017-11-08 PROBLEM — Z86.39 HISTORY OF HYPERLIPIDEMIA: Status: RESOLVED | Noted: 2017-11-08 | Resolved: 2017-11-08

## 2017-11-08 PROBLEM — Z63.4 WIDOWED: Status: ACTIVE | Noted: 2017-11-08

## 2017-11-08 PROBLEM — Z87.891 FORMER SMOKER: Status: RESOLVED | Noted: 2017-11-01 | Resolved: 2017-11-08

## 2017-11-08 LAB
BASOPHILS # BLD AUTO: 0.1 K/UL — SIGNIFICANT CHANGE UP (ref 0–0.2)
BASOPHILS NFR BLD AUTO: 1.1 % — SIGNIFICANT CHANGE UP (ref 0–2)
EOSINOPHIL # BLD AUTO: 0.2 K/UL — SIGNIFICANT CHANGE UP (ref 0–0.5)
EOSINOPHIL NFR BLD AUTO: 3 % — SIGNIFICANT CHANGE UP (ref 0–6)
HCT VFR BLD CALC: 46.3 % — SIGNIFICANT CHANGE UP (ref 39–50)
HGB BLD-MCNC: 14.9 G/DL — SIGNIFICANT CHANGE UP (ref 13–17)
LYMPHOCYTES # BLD AUTO: 1 K/UL — SIGNIFICANT CHANGE UP (ref 1–3.3)
LYMPHOCYTES # BLD AUTO: 15.6 % — SIGNIFICANT CHANGE UP (ref 13–44)
MCHC RBC-ENTMCNC: 29.2 PG — SIGNIFICANT CHANGE UP (ref 27–34)
MCHC RBC-ENTMCNC: 32.1 GM/DL — SIGNIFICANT CHANGE UP (ref 32–36)
MCV RBC AUTO: 90.8 FL — SIGNIFICANT CHANGE UP (ref 80–100)
MONOCYTES # BLD AUTO: 0.4 K/UL — SIGNIFICANT CHANGE UP (ref 0–0.9)
MONOCYTES NFR BLD AUTO: 6.9 % — SIGNIFICANT CHANGE UP (ref 2–14)
NEUTROPHILS # BLD AUTO: 4.8 K/UL — SIGNIFICANT CHANGE UP (ref 1.8–7.4)
NEUTROPHILS NFR BLD AUTO: 73.4 % — SIGNIFICANT CHANGE UP (ref 43–77)
PLATELET # BLD AUTO: 248 K/UL — SIGNIFICANT CHANGE UP (ref 150–400)
RBC # BLD: 5.1 M/UL — SIGNIFICANT CHANGE UP (ref 4.2–5.8)
RBC # FLD: 11.8 % — SIGNIFICANT CHANGE UP (ref 10.3–14.5)
WBC # BLD: 6.5 K/UL — SIGNIFICANT CHANGE UP (ref 3.8–10.5)
WBC # FLD AUTO: 6.5 K/UL — SIGNIFICANT CHANGE UP (ref 3.8–10.5)

## 2017-11-08 PROCEDURE — 99205 OFFICE O/P NEW HI 60 MIN: CPT

## 2017-11-08 PROCEDURE — 99204 OFFICE O/P NEW MOD 45 MIN: CPT | Mod: 25

## 2017-11-08 RX ORDER — ESCITALOPRAM OXALATE 10 MG/1
10 TABLET ORAL
Qty: 30 | Refills: 0 | Status: DISCONTINUED | COMMUNITY
Start: 2017-10-02 | End: 2017-11-08

## 2017-11-08 RX ORDER — MEMANTINE HYDROCHLORIDE AND DONEPEZIL HYDROCHLORIDE 7; 10 MG/1; MG/1
7-10 CAPSULE ORAL
Qty: 30 | Refills: 0 | Status: DISCONTINUED | COMMUNITY
Start: 2017-07-25 | End: 2017-11-08

## 2017-11-09 ENCOUNTER — INPATIENT (INPATIENT)
Facility: HOSPITAL | Age: 82
LOS: 3 days | Discharge: HOME | DRG: 378 | End: 2017-11-13
Attending: HOSPITALIST | Admitting: HOSPITALIST
Payer: MEDICARE

## 2017-11-09 VITALS
RESPIRATION RATE: 18 BRPM | OXYGEN SATURATION: 97 % | DIASTOLIC BLOOD PRESSURE: 68 MMHG | WEIGHT: 169.98 LBS | HEART RATE: 74 BPM | SYSTOLIC BLOOD PRESSURE: 122 MMHG | HEIGHT: 67 IN | TEMPERATURE: 98 F

## 2017-11-09 DIAGNOSIS — Z98.41 CATARACT EXTRACTION STATUS, RIGHT EYE: Chronic | ICD-10-CM

## 2017-11-09 DIAGNOSIS — K92.2 GASTROINTESTINAL HEMORRHAGE, UNSPECIFIED: ICD-10-CM

## 2017-11-09 DIAGNOSIS — Z98.42 CATARACT EXTRACTION STATUS, LEFT EYE: Chronic | ICD-10-CM

## 2017-11-09 LAB
ALBUMIN SERPL ELPH-MCNC: 3.6 G/DL — SIGNIFICANT CHANGE UP (ref 3.3–5.2)
ALBUMIN SERPL ELPH-MCNC: 4.3 G/DL
ALP BLD-CCNC: 58 U/L
ALP SERPL-CCNC: 60 U/L — SIGNIFICANT CHANGE UP (ref 40–120)
ALT FLD-CCNC: 8 U/L — SIGNIFICANT CHANGE UP
ALT SERPL-CCNC: 5 U/L
ANION GAP SERPL CALC-SCNC: 12 MMOL/L — SIGNIFICANT CHANGE UP (ref 5–17)
ANION GAP SERPL CALC-SCNC: 14 MMOL/L
APPEARANCE UR: CLEAR — SIGNIFICANT CHANGE UP
APTT BLD: 29 SEC — SIGNIFICANT CHANGE UP (ref 27.5–37.4)
AST SERPL-CCNC: 15 U/L
AST SERPL-CCNC: 20 U/L — SIGNIFICANT CHANGE UP
BASOPHILS # BLD AUTO: 0 K/UL — SIGNIFICANT CHANGE UP (ref 0–0.2)
BASOPHILS NFR BLD AUTO: 0.5 % — SIGNIFICANT CHANGE UP (ref 0–2)
BILIRUB SERPL-MCNC: 0.7 MG/DL — SIGNIFICANT CHANGE UP (ref 0.4–2)
BILIRUB SERPL-MCNC: 1.5 MG/DL
BILIRUB UR-MCNC: NEGATIVE — SIGNIFICANT CHANGE UP
BLD GP AB SCN SERPL QL: SIGNIFICANT CHANGE UP
BUN SERPL-MCNC: 12 MG/DL
BUN SERPL-MCNC: 13 MG/DL — SIGNIFICANT CHANGE UP (ref 8–20)
CALCIUM SERPL-MCNC: 8.7 MG/DL — SIGNIFICANT CHANGE UP (ref 8.6–10.2)
CALCIUM SERPL-MCNC: 9.4 MG/DL
CEA SERPL-MCNC: 6.4 NG/ML
CHLORIDE SERPL-SCNC: 98 MMOL/L
CHLORIDE SERPL-SCNC: 99 MMOL/L — SIGNIFICANT CHANGE UP (ref 98–107)
CO2 SERPL-SCNC: 26 MMOL/L — SIGNIFICANT CHANGE UP (ref 22–29)
CO2 SERPL-SCNC: 29 MMOL/L
COLOR SPEC: YELLOW — SIGNIFICANT CHANGE UP
COMMENT - URINE: SIGNIFICANT CHANGE UP
CREAT SERPL-MCNC: 0.93 MG/DL — SIGNIFICANT CHANGE UP (ref 0.5–1.3)
CREAT SERPL-MCNC: 1.02 MG/DL
DIFF PNL FLD: NEGATIVE — SIGNIFICANT CHANGE UP
EOSINOPHIL # BLD AUTO: 0.2 K/UL — SIGNIFICANT CHANGE UP (ref 0–0.5)
EOSINOPHIL NFR BLD AUTO: 3.4 % — SIGNIFICANT CHANGE UP (ref 0–5)
EPI CELLS # UR: SIGNIFICANT CHANGE UP
GLUCOSE SERPL-MCNC: 115 MG/DL
GLUCOSE SERPL-MCNC: 129 MG/DL — HIGH (ref 70–115)
GLUCOSE UR QL: NEGATIVE MG/DL — SIGNIFICANT CHANGE UP
HBV CORE IGG+IGM SER QL: NONREACTIVE
HBV SURFACE AB SER QL: NONREACTIVE
HBV SURFACE AG SER QL: NONREACTIVE
HCT VFR BLD CALC: 40.6 % — LOW (ref 42–52)
HCV AB SER QL: NONREACTIVE
HCV S/CO RATIO: 0.09 S/CO
HGB BLD-MCNC: 13.9 G/DL — LOW (ref 14–18)
INR BLD: 0.94 RATIO — SIGNIFICANT CHANGE UP (ref 0.88–1.16)
INR PPP: 0.95 RATIO
KETONES UR-MCNC: NEGATIVE — SIGNIFICANT CHANGE UP
LEUKOCYTE ESTERASE UR-ACNC: ABNORMAL
LYMPHOCYTES # BLD AUTO: 1.2 K/UL — SIGNIFICANT CHANGE UP (ref 1–4.8)
LYMPHOCYTES # BLD AUTO: 19.4 % — LOW (ref 20–55)
MAGNESIUM SERPL-MCNC: 2.2 MG/DL
MCHC RBC-ENTMCNC: 29.8 PG — SIGNIFICANT CHANGE UP (ref 27–31)
MCHC RBC-ENTMCNC: 34.2 G/DL — SIGNIFICANT CHANGE UP (ref 32–36)
MCV RBC AUTO: 87.1 FL — SIGNIFICANT CHANGE UP (ref 80–94)
MONOCYTES # BLD AUTO: 0.7 K/UL — SIGNIFICANT CHANGE UP (ref 0–0.8)
MONOCYTES NFR BLD AUTO: 10.8 % — HIGH (ref 3–10)
NEUTROPHILS # BLD AUTO: 4.2 K/UL — SIGNIFICANT CHANGE UP (ref 1.8–8)
NEUTROPHILS NFR BLD AUTO: 65.7 % — SIGNIFICANT CHANGE UP (ref 37–73)
NITRITE UR-MCNC: NEGATIVE — SIGNIFICANT CHANGE UP
PH UR: 5 — SIGNIFICANT CHANGE UP (ref 5–8)
PLATELET # BLD AUTO: 233 K/UL — SIGNIFICANT CHANGE UP (ref 150–400)
POTASSIUM SERPL-MCNC: 4.2 MMOL/L — SIGNIFICANT CHANGE UP (ref 3.5–5.3)
POTASSIUM SERPL-SCNC: 4.2 MMOL/L — SIGNIFICANT CHANGE UP (ref 3.5–5.3)
POTASSIUM SERPL-SCNC: 4.3 MMOL/L
PROT SERPL-MCNC: 6.5 G/DL — LOW (ref 6.6–8.7)
PROT SERPL-MCNC: 6.9 G/DL
PROT UR-MCNC: NEGATIVE MG/DL — SIGNIFICANT CHANGE UP
PROTHROM AB SERPL-ACNC: 10.3 SEC — SIGNIFICANT CHANGE UP (ref 9.8–12.7)
PT BLD: 10.7 SEC
RBC # BLD: 4.66 M/UL — SIGNIFICANT CHANGE UP (ref 4.6–6.2)
RBC # FLD: 13.3 % — SIGNIFICANT CHANGE UP (ref 11–15.6)
RBC CASTS # UR COMP ASSIST: SIGNIFICANT CHANGE UP /HPF (ref 0–4)
SODIUM SERPL-SCNC: 137 MMOL/L — SIGNIFICANT CHANGE UP (ref 135–145)
SODIUM SERPL-SCNC: 141 MMOL/L
SP GR SPEC: 1.02 — SIGNIFICANT CHANGE UP (ref 1.01–1.02)
TYPE + AB SCN PNL BLD: SIGNIFICANT CHANGE UP
UROBILINOGEN FLD QL: 1 MG/DL
WBC # BLD: 6.4 K/UL — SIGNIFICANT CHANGE UP (ref 4.8–10.8)
WBC # FLD AUTO: 6.4 K/UL — SIGNIFICANT CHANGE UP (ref 4.8–10.8)
WBC UR QL: SIGNIFICANT CHANGE UP

## 2017-11-09 PROCEDURE — 99285 EMERGENCY DEPT VISIT HI MDM: CPT

## 2017-11-09 PROCEDURE — 93010 ELECTROCARDIOGRAM REPORT: CPT

## 2017-11-09 RX ORDER — PANTOPRAZOLE SODIUM 20 MG/1
40 TABLET, DELAYED RELEASE ORAL
Qty: 0 | Refills: 0 | Status: DISCONTINUED | OUTPATIENT
Start: 2017-11-09 | End: 2017-11-13

## 2017-11-09 RX ORDER — MEMANTINE HYDROCHLORIDE AND DONEPEZIL HYDROCHLORIDE 21; 10 MG/1; MG/1
1 CAPSULE ORAL
Qty: 0 | Refills: 0 | COMMUNITY

## 2017-11-09 RX ORDER — LOSARTAN POTASSIUM 100 MG/1
1 TABLET, FILM COATED ORAL
Qty: 0 | Refills: 0 | COMMUNITY

## 2017-11-09 RX ORDER — SODIUM CHLORIDE 9 MG/ML
3 INJECTION INTRAMUSCULAR; INTRAVENOUS; SUBCUTANEOUS ONCE
Qty: 0 | Refills: 0 | Status: COMPLETED | OUTPATIENT
Start: 2017-11-09 | End: 2017-11-09

## 2017-11-09 NOTE — H&P ADULT - HISTORY OF PRESENT ILLNESS
Pt is a 85 yo male with a pmh/o dementia, GERD, depression, colon cancer, prostate cancer, HTN, with recent CVA with d/c to [facility] presents today secondary to dark red melanotic stool found in diaper at NH. In ED pt had additional episode with large amount of bleeding as per ED physician. Pt is poor historian however denies any symptoms. No additional episodes noted overnight.

## 2017-11-09 NOTE — ED PROVIDER NOTE - NS_ ATTENDINGSCRIBEDETAILS _ED_A_ED_FT
The history, relevant review of systems, past medical and surgical history, medical decision making, and physical examination was documented by the scribe in my presence and I attest to the accuracy of the documentation.      pt with large volume gi bleed on aspirin and pt will need admission for gi bleed.

## 2017-11-09 NOTE — H&P ADULT - PROBLEM SELECTOR PLAN 2
at baseline  Case management as pt will need to return to [facility]  hold medications as NPO

## 2017-11-09 NOTE — H&P ADULT - ASSESSMENT
Pt is a 85 yo male with a pmh/o dementia, GERD, depression, colon cancer, prostate cancer, HTN, with recent CVA with d/c to [facility] admitted with lower GI bleed

## 2017-11-09 NOTE — H&P ADULT - PMH
Colon cancer    CVA (cerebral vascular accident)    Dementia    Depression    GERD (gastroesophageal reflux disease)    High cholesterol    HTN (hypertension)    Memory loss or impairment    Prostate CA

## 2017-11-09 NOTE — ED ADULT NURSE NOTE - OBJECTIVE STATEMENT
pt's sons states that patient having rectal bleeding that got worse tonight and states tjat patient always has a brownish blood rectal discharge. patient deneis any pain

## 2017-11-09 NOTE — ED ADULT NURSE NOTE - PRIMARY CARE PROVIDER
Alisia carrasco GI normal... Well appearing, well nourished, awake, alert, oriented to person, place, time/situation and in no apparent distress. Duy at [facility] Alisia MCDONALD

## 2017-11-09 NOTE — ED ADULT NURSE NOTE - PMH
Colon cancer    HTN (hypertension)    Memory loss or impairment    Prostate CA Colon cancer    CVA (cerebral vascular accident)    Dementia    Depression    GERD (gastroesophageal reflux disease)    High cholesterol    HTN (hypertension)    Memory loss or impairment    Prostate CA

## 2017-11-09 NOTE — H&P ADULT - PROBLEM SELECTOR PLAN 1
in pt with h/o rectal cancer with gross blood and melena on exam   NPO with plan to advance as tolerated and as per GI  GI consult placed for AM  protonix IV bid  serial h/h q 6 hrs  monitored bed with plan for downgrade if hemodynamically stable overnight  transfuse as needed for HgB >8  CT abdomen negative for brisk acute GI bleeding

## 2017-11-09 NOTE — ED PROVIDER NOTE - OBJECTIVE STATEMENT
87 y/o M presents to the ED c/o rectal bleeding. Pt states that currently, nothing is bothering him. He denies abdominal pain. No further complaints at this time.

## 2017-11-09 NOTE — ED PROVIDER NOTE - MEDICAL DECISION MAKING DETAILS
87 y/o M  with maroon stool. Will get type and screen, coags, labs, CXR, EKG, and reevaluate. Blood transfusion if hemoglobin is under 8.

## 2017-11-09 NOTE — ED PROVIDER NOTE - PROGRESS NOTE DETAILS
pt admitted had large maroon stool BM and large amount of blood in diaper. will need admission for serial cbc's and monitoring/tx of bleeding

## 2017-11-10 DIAGNOSIS — I63.9 CEREBRAL INFARCTION, UNSPECIFIED: ICD-10-CM

## 2017-11-10 DIAGNOSIS — I10 ESSENTIAL (PRIMARY) HYPERTENSION: ICD-10-CM

## 2017-11-10 DIAGNOSIS — C20 MALIGNANT NEOPLASM OF RECTUM: ICD-10-CM

## 2017-11-10 DIAGNOSIS — F03.90 UNSPECIFIED DEMENTIA WITHOUT BEHAVIORAL DISTURBANCE: ICD-10-CM

## 2017-11-10 DIAGNOSIS — K92.2 GASTROINTESTINAL HEMORRHAGE, UNSPECIFIED: ICD-10-CM

## 2017-11-10 LAB
ALBUMIN SERPL ELPH-MCNC: 3.4 G/DL — SIGNIFICANT CHANGE UP (ref 3.3–5.2)
ALP SERPL-CCNC: 54 U/L — SIGNIFICANT CHANGE UP (ref 40–120)
ALT FLD-CCNC: 6 U/L — SIGNIFICANT CHANGE UP
ANION GAP SERPL CALC-SCNC: 10 MMOL/L — SIGNIFICANT CHANGE UP (ref 5–17)
APTT BLD: 28.9 SEC — SIGNIFICANT CHANGE UP (ref 27.5–37.4)
AST SERPL-CCNC: 13 U/L — SIGNIFICANT CHANGE UP
BILIRUB SERPL-MCNC: 0.9 MG/DL — SIGNIFICANT CHANGE UP (ref 0.4–2)
BUN SERPL-MCNC: 12 MG/DL — SIGNIFICANT CHANGE UP (ref 8–20)
CALCIUM SERPL-MCNC: 9 MG/DL — SIGNIFICANT CHANGE UP (ref 8.6–10.2)
CHLORIDE SERPL-SCNC: 100 MMOL/L — SIGNIFICANT CHANGE UP (ref 98–107)
CO2 SERPL-SCNC: 31 MMOL/L — HIGH (ref 22–29)
CREAT SERPL-MCNC: 1.02 MG/DL — SIGNIFICANT CHANGE UP (ref 0.5–1.3)
CULTURE RESULTS: NO GROWTH — SIGNIFICANT CHANGE UP
GLUCOSE SERPL-MCNC: 105 MG/DL — SIGNIFICANT CHANGE UP (ref 70–115)
HCT VFR BLD CALC: 37.8 % — LOW (ref 42–52)
HCT VFR BLD CALC: 38.2 % — LOW (ref 42–52)
HGB BLD-MCNC: 13.2 G/DL — LOW (ref 14–18)
HGB BLD-MCNC: 13.2 G/DL — LOW (ref 14–18)
INR BLD: 0.98 RATIO — SIGNIFICANT CHANGE UP (ref 0.88–1.16)
POTASSIUM SERPL-MCNC: 4 MMOL/L — SIGNIFICANT CHANGE UP (ref 3.5–5.3)
POTASSIUM SERPL-SCNC: 4 MMOL/L — SIGNIFICANT CHANGE UP (ref 3.5–5.3)
PROT SERPL-MCNC: 5.9 G/DL — LOW (ref 6.6–8.7)
PROTHROM AB SERPL-ACNC: 10.8 SEC — SIGNIFICANT CHANGE UP (ref 9.8–12.7)
SODIUM SERPL-SCNC: 141 MMOL/L — SIGNIFICANT CHANGE UP (ref 135–145)
SPECIMEN SOURCE: SIGNIFICANT CHANGE UP

## 2017-11-10 PROCEDURE — 77263 THER RADIOLOGY TX PLNG CPLX: CPT

## 2017-11-10 PROCEDURE — 99223 1ST HOSP IP/OBS HIGH 75: CPT

## 2017-11-10 PROCEDURE — 74174 CTA ABD&PLVS W/CONTRAST: CPT | Mod: 26

## 2017-11-10 RX ADMIN — PANTOPRAZOLE SODIUM 40 MILLIGRAM(S): 20 TABLET, DELAYED RELEASE ORAL at 06:02

## 2017-11-10 RX ADMIN — PANTOPRAZOLE SODIUM 40 MILLIGRAM(S): 20 TABLET, DELAYED RELEASE ORAL at 00:44

## 2017-11-10 RX ADMIN — SODIUM CHLORIDE 3 MILLILITER(S): 9 INJECTION INTRAMUSCULAR; INTRAVENOUS; SUBCUTANEOUS at 00:28

## 2017-11-10 RX ADMIN — PANTOPRAZOLE SODIUM 40 MILLIGRAM(S): 20 TABLET, DELAYED RELEASE ORAL at 17:10

## 2017-11-10 NOTE — ED ADULT NURSE REASSESSMENT NOTE - NS ED NURSE REASSESS COMMENT FT1
patient returned from ct.  report given to holding room nurse and patient ready to be transferred to [facility]

## 2017-11-11 LAB
HCT VFR BLD CALC: 38.8 % — LOW (ref 42–52)
HGB BLD-MCNC: 13.2 G/DL — LOW (ref 14–18)
MCHC RBC-ENTMCNC: 29.5 PG — SIGNIFICANT CHANGE UP (ref 27–31)
MCHC RBC-ENTMCNC: 34 G/DL — SIGNIFICANT CHANGE UP (ref 32–36)
MCV RBC AUTO: 86.8 FL — SIGNIFICANT CHANGE UP (ref 80–94)
PLATELET # BLD AUTO: 228 K/UL — SIGNIFICANT CHANGE UP (ref 150–400)
RBC # BLD: 4.47 M/UL — LOW (ref 4.6–6.2)
RBC # FLD: 13.5 % — SIGNIFICANT CHANGE UP (ref 11–15.6)
WBC # BLD: 5.8 K/UL — SIGNIFICANT CHANGE UP (ref 4.8–10.8)
WBC # FLD AUTO: 5.8 K/UL — SIGNIFICANT CHANGE UP (ref 4.8–10.8)

## 2017-11-11 PROCEDURE — 99233 SBSQ HOSP IP/OBS HIGH 50: CPT

## 2017-11-11 RX ADMIN — PANTOPRAZOLE SODIUM 40 MILLIGRAM(S): 20 TABLET, DELAYED RELEASE ORAL at 05:02

## 2017-11-11 RX ADMIN — PANTOPRAZOLE SODIUM 40 MILLIGRAM(S): 20 TABLET, DELAYED RELEASE ORAL at 20:11

## 2017-11-12 LAB
HCT VFR BLD CALC: 39.4 % — LOW (ref 42–52)
HGB BLD-MCNC: 13.5 G/DL — LOW (ref 14–18)

## 2017-11-12 PROCEDURE — 99233 SBSQ HOSP IP/OBS HIGH 50: CPT

## 2017-11-12 RX ADMIN — PANTOPRAZOLE SODIUM 40 MILLIGRAM(S): 20 TABLET, DELAYED RELEASE ORAL at 17:18

## 2017-11-12 RX ADMIN — PANTOPRAZOLE SODIUM 40 MILLIGRAM(S): 20 TABLET, DELAYED RELEASE ORAL at 06:47

## 2017-11-12 NOTE — PROGRESS NOTE ADULT - ASSESSMENT
Pt is with h/o rectal /.colon cancer admitted for rectal bleed  which is cronic per son , under evaluation by oncology and DR Lawrence for possible radiation therapy , HB remains stable , intermittent mild bleeding with BM

## 2017-11-13 ENCOUNTER — TRANSCRIPTION ENCOUNTER (OUTPATIENT)
Age: 82
End: 2017-11-13

## 2017-11-13 VITALS
TEMPERATURE: 98 F | SYSTOLIC BLOOD PRESSURE: 138 MMHG | DIASTOLIC BLOOD PRESSURE: 76 MMHG | RESPIRATION RATE: 17 BRPM | HEART RATE: 66 BPM

## 2017-11-13 DIAGNOSIS — C20 MALIGNANT NEOPLASM OF RECTUM: ICD-10-CM

## 2017-11-13 LAB
ANION GAP SERPL CALC-SCNC: 10 MMOL/L — SIGNIFICANT CHANGE UP (ref 5–17)
BUN SERPL-MCNC: 21 MG/DL — HIGH (ref 8–20)
CALCIUM SERPL-MCNC: 8.7 MG/DL — SIGNIFICANT CHANGE UP (ref 8.6–10.2)
CHLORIDE SERPL-SCNC: 103 MMOL/L — SIGNIFICANT CHANGE UP (ref 98–107)
CO2 SERPL-SCNC: 30 MMOL/L — HIGH (ref 22–29)
CREAT SERPL-MCNC: 1.06 MG/DL — SIGNIFICANT CHANGE UP (ref 0.5–1.3)
GLUCOSE SERPL-MCNC: 99 MG/DL — SIGNIFICANT CHANGE UP (ref 70–115)
HCT VFR BLD CALC: 38.3 % — LOW (ref 42–52)
HGB BLD-MCNC: 13 G/DL — LOW (ref 14–18)
MAGNESIUM SERPL-MCNC: 2.1 MG/DL — SIGNIFICANT CHANGE UP (ref 1.6–2.6)
PHOSPHATE SERPL-MCNC: 3.2 MG/DL — SIGNIFICANT CHANGE UP (ref 2.4–4.7)
POTASSIUM SERPL-MCNC: 3.8 MMOL/L — SIGNIFICANT CHANGE UP (ref 3.5–5.3)
POTASSIUM SERPL-SCNC: 3.8 MMOL/L — SIGNIFICANT CHANGE UP (ref 3.5–5.3)
SODIUM SERPL-SCNC: 143 MMOL/L — SIGNIFICANT CHANGE UP (ref 135–145)

## 2017-11-13 PROCEDURE — 86900 BLOOD TYPING SEROLOGIC ABO: CPT

## 2017-11-13 PROCEDURE — 97163 PT EVAL HIGH COMPLEX 45 MIN: CPT

## 2017-11-13 PROCEDURE — 93005 ELECTROCARDIOGRAM TRACING: CPT

## 2017-11-13 PROCEDURE — 84100 ASSAY OF PHOSPHORUS: CPT

## 2017-11-13 PROCEDURE — 80053 COMPREHEN METABOLIC PANEL: CPT

## 2017-11-13 PROCEDURE — 99285 EMERGENCY DEPT VISIT HI MDM: CPT | Mod: 25

## 2017-11-13 PROCEDURE — 86850 RBC ANTIBODY SCREEN: CPT

## 2017-11-13 PROCEDURE — 87086 URINE CULTURE/COLONY COUNT: CPT

## 2017-11-13 PROCEDURE — 36415 COLL VENOUS BLD VENIPUNCTURE: CPT

## 2017-11-13 PROCEDURE — 86901 BLOOD TYPING SEROLOGIC RH(D): CPT

## 2017-11-13 PROCEDURE — 81001 URINALYSIS AUTO W/SCOPE: CPT

## 2017-11-13 PROCEDURE — 85610 PROTHROMBIN TIME: CPT

## 2017-11-13 PROCEDURE — 83735 ASSAY OF MAGNESIUM: CPT

## 2017-11-13 PROCEDURE — 85027 COMPLETE CBC AUTOMATED: CPT

## 2017-11-13 PROCEDURE — 85730 THROMBOPLASTIN TIME PARTIAL: CPT

## 2017-11-13 PROCEDURE — 74174 CTA ABD&PLVS W/CONTRAST: CPT

## 2017-11-13 PROCEDURE — 80048 BASIC METABOLIC PNL TOTAL CA: CPT

## 2017-11-13 PROCEDURE — 85018 HEMOGLOBIN: CPT

## 2017-11-13 PROCEDURE — 99239 HOSP IP/OBS DSCHRG MGMT >30: CPT

## 2017-11-13 RX ORDER — PREGABALIN 225 MG/1
1 CAPSULE ORAL
Qty: 0 | Refills: 0 | COMMUNITY

## 2017-11-13 RX ORDER — ASPIRIN/CALCIUM CARB/MAGNESIUM 324 MG
1 TABLET ORAL
Qty: 0 | Refills: 0 | DISCHARGE
Start: 2017-11-13

## 2017-11-13 RX ADMIN — PANTOPRAZOLE SODIUM 40 MILLIGRAM(S): 20 TABLET, DELAYED RELEASE ORAL at 06:24

## 2017-11-13 NOTE — PROGRESS NOTE ADULT - PROBLEM SELECTOR PROBLEM 2
Dementia without behavioral disturbance, unspecified dementia type

## 2017-11-13 NOTE — DISCHARGE NOTE ADULT - CARE PROVIDER_API CALL
Pérez Lawrence (MD), Radiation Oncology  [address] [place] [address]  Phone: [phone]  Fax: [phone]

## 2017-11-13 NOTE — DISCHARGE NOTE ADULT - MEDICATION SUMMARY - MEDICATIONS TO TAKE
I will START or STAY ON the medications listed below when I get home from the hospital:    Aspirin Enteric Coated 81 mg oral delayed release tablet  -- 1 tab(s) by mouth once a day  -- Indication: For Home meds    escitalopram 10 mg oral tablet  -- 1 tab(s) by mouth once a day  -- Indication: For Depression    atorvastatin 20 mg oral tablet  -- 1 tab(s) by mouth once a day (at bedtime)  -- Indication: For High cholesterol    donepezil 10 mg oral tablet  -- 1 tab(s) by mouth once a day (at bedtime)  -- Indication: For Dementia    memantine 5 mg oral tablet  -- 1 tab(s) by mouth 2 times a day  -- Indication: For Dementia    pantoprazole 40 mg oral delayed release tablet  -- 1 tab(s) by mouth once a day  -- Indication: For GERD (gastroesophageal reflux disease)

## 2017-11-13 NOTE — DISCHARGE NOTE ADULT - SECONDARY DIAGNOSIS.
Rectal cancer Dementia without behavioral disturbance, unspecified dementia type Essential hypertension

## 2017-11-13 NOTE — PROGRESS NOTE ADULT - PROBLEM SELECTOR PLAN 1
stable , GI consult called by  nocturnist   ANALY strong
likely secondary to rectal cancer    improved , Hb remains stable slight drop
stable no active bleed  it is due to recent dx of rectal ca   discussed with son ; pt is for PET scan and initiation of radiation therapy by DR Lawrence   had recent colonoscopy by DR Pizano and daignosed with cancer , no need for further procedure   HB stable  likely back to [facility] on monday , advance diet
stable no active bleed , will discuss with his son re outpatient test ordered by his GI   HB stable  likely back to [facility] on monday , advance diet

## 2017-11-13 NOTE — DISCHARGE NOTE ADULT - HOSPITAL COURSE
Pt is a 87 yo male with dementia, GERD, depression, recently  diagnosed  rectal cancer /colon cancer, h/o prostate cancer, HTN, with recent CVA discharged to subacute rehab send to the ED with reported  dark red melanotic stool found in diaper at NH. In ED pt had additional episode with large amount of bleeding as per ED physician. Pt is poor historian however denies any symptoms. He was admitted and observed with frequent hb monitoring intially , per son he had recent colonoscopy by Dr Lopez, dx wt rectal ca , his father had surgery evaluation declined bowel surgery, also seen by DR Olguin and DR Lawrence and has lavonne thsi wednesday for simulation and plan of radiation therapy .  Pt had less and less BM , dark color, HB initially 14, now 13 day 3, stable , discussed with DR Lawrence no further testing , to keep lavonne on wednesday with him. Pt is with rectal bleed due to rectal cancer which has been going on last few months per family .He is discharged to assited living , his son Teodoro is aware of hospital course and current findings and plan , agree with Dc planning   total time spend 40 min

## 2017-11-13 NOTE — PHYSICAL THERAPY INITIAL EVALUATION ADULT - CRITERIA FOR SKILLED THERAPEUTIC INTERVENTIONS
anticipated discharge recommendation/Return to the [facility] with Supervision PRN 2*2 memory/cognition

## 2017-11-13 NOTE — DISCHARGE NOTE ADULT - PATIENT PORTAL LINK FT
“You can access the [facility] Patient Portal, offered by [facility], by registering with the following website: [email or web address]”

## 2017-11-13 NOTE — DISCHARGE NOTE ADULT - CARE PLAN
Principal Discharge DX:	Rectal bleed  Secondary Diagnosis:	Rectal cancer Principal Discharge DX:	Rectal bleed  Goal:	improve  Instructions for follow-up, activity and diet:	follow up with oncology to [facility] treatment for rectal cancer  Secondary Diagnosis:	Rectal cancer  Instructions for follow-up, activity and diet:	follow up with DR Lawrence on wednesday  Secondary Diagnosis:	Dementia without behavioral disturbance, unspecified dementia type  Secondary Diagnosis:	Essential hypertension

## 2017-11-13 NOTE — DISCHARGE NOTE ADULT - MEDICATION SUMMARY - MEDICATIONS TO CHANGE
I will SWITCH the dose or number of times a day I take the medications listed below when I get home from the hospital:    aspirin 325 mg oral delayed release tablet  -- 1 tab(s) by mouth once a day

## 2017-11-13 NOTE — DISCHARGE NOTE ADULT - PLAN OF CARE
improve follow up with oncology to [facility]ary treatment for rectal cancer follow up with DR Lawrence on wednesday

## 2017-11-13 NOTE — PROGRESS NOTE ADULT - PROBLEM SELECTOR PLAN 3
history
by history , out patient follow up with Dr Olguin and DR Lawrence , will discuss with them tomorrow on Monday
by history , out patient follow up with his gastroenterologsit
spoke to radiation oncologist Dr Lawrence , plan for lavonne on wednesday for simulation then satrt radiation therapy   will discharge to assisted living , will discuss with his son

## 2017-11-13 NOTE — PHYSICAL THERAPY INITIAL EVALUATION ADULT - ADDITIONAL COMMENTS
Pt is a poor historian, dismissive to all PT inquery. As per SW, Pt lives at the [facility]. No steps to navigate. Pt states that he lives with his son and daughter in law. Does not have steps to navigate. Documentation states pt came from [facility] s/p ISAAC.

## 2017-11-13 NOTE — PHYSICAL THERAPY INITIAL EVALUATION ADULT - PERTINENT HX OF CURRENT PROBLEM, REHAB EVAL
Pt is a 87 yo male with a pmh/o dementia, GERD, depression, colon cancer, prostate cancer, HTN, with recent CVA with d/c to [facility] presents today secondary to dark red melanotic stool found in diaper at NH.

## 2017-11-13 NOTE — PROGRESS NOTE ADULT - SUBJECTIVE AND OBJECTIVE BOX
Pt is confused to place , chart reviewed   no complaints , very small amount of blood  with BM reported by nurse    HPI:  Pt is a 85 yo male with a pmh/o dementia, GERD, depression, colon cancer, prostate cancer, HTN, with recent CVA with d/c to [facility] presents today secondary to dark red melanotic stool found in diaper at NH. In ED pt had additional episode with large amount of bleeding as per ED physician. Pt is poor historian however denies any symptoms. No additional episodes noted overnight. (2017 23:42)      Allergies    No Known Allergies    Intolerances        REVIEW OF SYSTEMS:  as above confused   Vital Signs Last 24 Hrs  T(C): 36.6 (10 Nov 2017 04:00), Max: 37.1 (10 Nov 2017 02:12)  T(F): 97.8 (10 Nov 2017 04:00), Max: 98.8 (10 Nov 2017 02:12)  HR: 60 (10 Nov 2017 04:04) (60 - 74)  BP: 139/65 (10 Nov 2017 04:04) (122/68 - 156/80)  BP(mean): --  RR: 18 (10 Nov 2017 04:00) (18 - 19)  SpO2: 95% (10 Nov 2017 04:00) (95% - 97%)    PHYSICAL EXAM:    GENERAL: NAD, well-groomed, well-developed  NECK: Supple, No JVD, Normal thyroid  CHEST/LUNG: Clear to auscultation bilaterally; No rales, rhonchi, wheezing, or rubs  HEART: Regular rate and rhythm; No murmurs, rubs, or gallops  ABDOMEN: Soft, Nontender, Nondistended; Bowel sounds present  EXTREMITIES:  2+ Peripheral Pulses, No clubbing, cyanosis, or edema  SKIN: No rashes or lesions      LABS:                        13.2   x     )-----------( x        ( 10 Nov 2017 07:55 )             38.2     11-10    141  |  100  |  12.0  ----------------------------<  105  4.0   |  31.0<H>  |  1.02    Ca    9.0      10 Nov 2017 07:47    TPro  5.9<L>  /  Alb  3.4  /  TBili  0.9  /  DBili  x   /  AST  13  /  ALT  6   /  AlkPhos  54  11-10    PT/INR - ( 10 Nov 2017 07:47 )   PT: 10.8 sec;   INR: 0.98 ratio         PTT - ( 10 Nov 2017 07:47 )  PTT:28.9 sec  Urinalysis Basic - ( 2017 21:00 )    Color: Yellow / Appearance: Clear / S.020 / pH: x  Gluc: x / Ketone: Negative  / Bili: Negative / Urobili: 1 mg/dL   Blood: x / Protein: Negative mg/dL / Nitrite: Negative   Leuk Esterase: Trace / RBC: 0-2 /HPF / WBC 3-5   Sq Epi: x / Non Sq Epi: Few / Bacteria: x        RADIOLOGY & ADDITIONAL TESTS:
Pt is lying in the bed awake alert  confused , denies any pain    spoke to his son over the [place] yesterday on details , apparently pt was diagnosed with colon /rectal ca recently had colonoscopy done by DR Lopez and evaluated by surgery however decided not to get surgery , then seen by DR Olguin and DR Lawrence to get PET scan on 11/15 and satrt radiation chemotherapy . He has been having blood with BM for few months on off due to cancer       Allergies    No Known Allergies    Intolerances        REVIEW OF SYSTEMS:  as above confused   Vital Signs Last 24 Hrs  T(C): 36.8 (12 Nov 2017 08:15), Max: 37.1 (11 Nov 2017 20:55)  T(F): 98.2 (12 Nov 2017 08:15), Max: 98.7 (11 Nov 2017 20:55)  HR: 69 (12 Nov 2017 08:15) (64 - 76)  BP: 117/71 (12 Nov 2017 08:15) (100/56 - 117/71)  BP(mean): --  RR: 16 (12 Nov 2017 04:29) (16 - 16)  SpO2: 96% (12 Nov 2017 08:15) (96% - 98%)  PHYSICAL EXAM:    GENERAL: NAD, well-groomed, well-developed  NECK: Supple, No JVD, Normal thyroid  CHEST/LUNG: Clear to auscultation bilaterally; No rales, rhonchi, wheezing, or rubs  HEART: Regular rate and rhythm; No murmurs, rubs, or gallops  ABDOMEN: Soft, Nontender, Nondistended; Bowel sounds present  EXTREMITIES:  2+ Peripheral Pulses, No clubbing, cyanosis, or edema      LABS:                              13.2   5.8   )-----------( 228      ( 11 Nov 2017 06:08 )             38.8
Pt is lying in the bed awake alert  confused , denies any pain  . BM x1 per nurse yesterday no blood reported   I had discussed with DR Lawrence on the phone thsi am , has [facility] on wed for simulation and to start radiation therapy soon , no further in patient treatment r testing needed     No Known Allergies    Intolerances        REVIEW OF SYSTEMS:  as above confused , otherwise negative   Vital Signs Last 24 Hrs  T(C): 36.9 (13 Nov 2017 09:49), Max: 36.9 (13 Nov 2017 09:49)  T(F): 98.4 (13 Nov 2017 09:49), Max: 98.4 (13 Nov 2017 09:49)  HR: 65 (13 Nov 2017 09:49) (60 - 75)  BP: 140/78 (13 Nov 2017 09:49) (120/72 - 140/78)  BP(mean): --  RR: 18 (13 Nov 2017 09:49) (18 - 18)  SpO2: --    GENERAL: NAD, well-groomed, well-developed  CHEST/LUNG: Clear to auscultation bilaterally; No rales, rhonchi, wheezing, or rubs  HEART: Regular rate and rhythm; No murmurs, rubs, or gallops  ABDOMEN: Soft, Nontender, Nondistended; Bowel sounds present  EXTREMITIES:  2+ Peripheral Pulses, No clubbing, cyanosis, or edema      LABS:                             Hemoglobin + Hematocrit (11.13.17 @ 05:02)    Hemoglobin: 13.0 g/dL    Hematocrit: 38.3 %
Pt is lying in the bed comfortable  no distress   he is still with BM reported dark red once and black per staff documentation   called his son Teodoro to talk to no answer left message     HPI:  Pt is a 87 yo male with a pmh/o dementia, GERD, depression, colon cancer, prostate cancer, HTN, with recent CVA with d/c to [facility] presents today secondary to dark red melanotic stool found in diaper at NH. In ED pt had additional episode with large amount of bleeding as per ED physician. Pt is poor historian however denies any symptoms. No additional episodes noted overnight.      Allergies    No Known Allergies    Intolerances        REVIEW OF SYSTEMS:  as above confused   Vital Signs Last 24 Hrs  T(C): 36.8 (11 Nov 2017 09:01), Max: 36.9 (10 Nov 2017 22:33)  T(F): 98.2 (11 Nov 2017 09:01), Max: 98.5 (10 Nov 2017 22:33)  HR: 62 (11 Nov 2017 09:01) (62 - 78)  BP: 139/77 (11 Nov 2017 09:01) (122/60 - 139/77)  BP(mean): --  RR: 18 (11 Nov 2017 09:01) (17 - 18)  SpO2: 97% (11 Nov 2017 04:52) (95% - 97%)  PHYSICAL EXAM:    GENERAL: NAD, well-groomed, well-developed  NECK: Supple, No JVD, Normal thyroid  CHEST/LUNG: Clear to auscultation bilaterally; No rales, rhonchi, wheezing, or rubs  HEART: Regular rate and rhythm; No murmurs, rubs, or gallops  ABDOMEN: Soft, Nontender, Nondistended; Bowel sounds present  EXTREMITIES:  2+ Peripheral Pulses, No clubbing, cyanosis, or edema  SKIN: No rashes or lesions      LABS:                                13.2   5.8   )-----------( 228      ( 11 Nov 2017 06:08 )             38.8   11-10    141  |  100  |  12.0  ----------------------------<  105  4.0   |  31.0<H>  |  1.02    Ca    9.0      10 Nov 2017 07:47    TPro  5.9<L>  /  Alb  3.4  /  TBili  0.9  /  DBili  x   /  AST  13  /  ALT  6   /  AlkPhos  54  11-10    RADIOLOGY & ADDITIONAL TESTS:

## 2017-11-14 ENCOUNTER — FORM ENCOUNTER (OUTPATIENT)
Age: 82
End: 2017-11-14

## 2017-11-14 PROBLEM — C18.9 MALIGNANT NEOPLASM OF COLON, UNSPECIFIED: Chronic | Status: ACTIVE | Noted: 2017-11-09

## 2017-11-15 ENCOUNTER — APPOINTMENT (OUTPATIENT)
Dept: CT IMAGING | Facility: CLINIC | Age: 82
End: 2017-11-15
Payer: MEDICARE

## 2017-11-15 ENCOUNTER — OUTPATIENT (OUTPATIENT)
Dept: OUTPATIENT SERVICES | Facility: HOSPITAL | Age: 82
LOS: 1 days | End: 2017-11-15

## 2017-11-15 DIAGNOSIS — Z00.8 ENCOUNTER FOR OTHER GENERAL EXAMINATION: ICD-10-CM

## 2017-11-15 DIAGNOSIS — Z98.42 CATARACT EXTRACTION STATUS, LEFT EYE: Chronic | ICD-10-CM

## 2017-11-15 DIAGNOSIS — Z98.41 CATARACT EXTRACTION STATUS, RIGHT EYE: Chronic | ICD-10-CM

## 2017-11-15 PROCEDURE — 71250 CT THORAX DX C-: CPT | Mod: 26

## 2017-11-15 PROCEDURE — 77334 RADIATION TREATMENT AID(S): CPT | Mod: 26

## 2017-11-15 PROCEDURE — 77290 THER RAD SIMULAJ FIELD CPLX: CPT | Mod: 26

## 2017-11-16 ENCOUNTER — RESULT REVIEW (OUTPATIENT)
Age: 82
End: 2017-11-16

## 2017-11-16 PROCEDURE — 88321 CONSLTJ&REPRT SLD PREP ELSWR: CPT

## 2017-11-27 PROCEDURE — 77300 RADIATION THERAPY DOSE PLAN: CPT | Mod: 26

## 2017-11-27 PROCEDURE — 77295 3-D RADIOTHERAPY PLAN: CPT | Mod: 26

## 2017-11-27 PROCEDURE — 77334 RADIATION TREATMENT AID(S): CPT | Mod: 26

## 2017-11-29 PROCEDURE — 77280 THER RAD SIMULAJ FIELD SMPL: CPT | Mod: 26

## 2017-11-30 ENCOUNTER — RESULT REVIEW (OUTPATIENT)
Age: 82
End: 2017-11-30

## 2017-11-30 ENCOUNTER — APPOINTMENT (OUTPATIENT)
Dept: HEMATOLOGY ONCOLOGY | Facility: CLINIC | Age: 82
End: 2017-11-30
Payer: MEDICARE

## 2017-11-30 VITALS
SYSTOLIC BLOOD PRESSURE: 147 MMHG | BODY MASS INDEX: 24.21 KG/M2 | WEIGHT: 156.64 LBS | HEART RATE: 63 BPM | OXYGEN SATURATION: 99 % | DIASTOLIC BLOOD PRESSURE: 76 MMHG | TEMPERATURE: 97.6 F

## 2017-11-30 LAB
BASOPHILS # BLD AUTO: 0.1 K/UL — SIGNIFICANT CHANGE UP (ref 0–0.2)
BASOPHILS NFR BLD AUTO: 1.1 % — SIGNIFICANT CHANGE UP (ref 0–2)
EOSINOPHIL # BLD AUTO: 0.1 K/UL — SIGNIFICANT CHANGE UP (ref 0–0.5)
EOSINOPHIL NFR BLD AUTO: 2.6 % — SIGNIFICANT CHANGE UP (ref 0–6)
HCT VFR BLD CALC: 42.5 % — SIGNIFICANT CHANGE UP (ref 39–50)
HGB BLD-MCNC: 13.9 G/DL — SIGNIFICANT CHANGE UP (ref 13–17)
LYMPHOCYTES # BLD AUTO: 1 K/UL — SIGNIFICANT CHANGE UP (ref 1–3.3)
LYMPHOCYTES # BLD AUTO: 16.8 % — SIGNIFICANT CHANGE UP (ref 13–44)
MCHC RBC-ENTMCNC: 29.7 PG — SIGNIFICANT CHANGE UP (ref 27–34)
MCHC RBC-ENTMCNC: 32.7 GM/DL — SIGNIFICANT CHANGE UP (ref 32–36)
MCV RBC AUTO: 90.8 FL — SIGNIFICANT CHANGE UP (ref 80–100)
MONOCYTES # BLD AUTO: 0.4 K/UL — SIGNIFICANT CHANGE UP (ref 0–0.9)
MONOCYTES NFR BLD AUTO: 6.9 % — SIGNIFICANT CHANGE UP (ref 2–14)
NEUTROPHILS # BLD AUTO: 4.1 K/UL — SIGNIFICANT CHANGE UP (ref 1.8–7.4)
NEUTROPHILS NFR BLD AUTO: 72.7 % — SIGNIFICANT CHANGE UP (ref 43–77)
PLATELET # BLD AUTO: 248 K/UL — SIGNIFICANT CHANGE UP (ref 150–400)
RBC # BLD: 4.68 M/UL — SIGNIFICANT CHANGE UP (ref 4.2–5.8)
RBC # FLD: 12.4 % — SIGNIFICANT CHANGE UP (ref 10.3–14.5)
WBC # BLD: 5.7 K/UL — SIGNIFICANT CHANGE UP (ref 3.8–10.5)
WBC # FLD AUTO: 5.7 K/UL — SIGNIFICANT CHANGE UP (ref 3.8–10.5)

## 2017-11-30 PROCEDURE — 99214 OFFICE O/P EST MOD 30 MIN: CPT

## 2017-11-30 RX ORDER — PROCHLORPERAZINE MALEATE 10 MG/1
10 TABLET ORAL EVERY 6 HOURS
Qty: 120 | Refills: 3 | Status: ACTIVE | COMMUNITY
Start: 2017-11-30 | End: 1900-01-01

## 2017-12-01 LAB
ALBUMIN SERPL ELPH-MCNC: 3.7 G/DL
ALP BLD-CCNC: 51 U/L
ALT SERPL-CCNC: 8 U/L
ANION GAP SERPL CALC-SCNC: 12 MMOL/L
AST SERPL-CCNC: 15 U/L
BILIRUB SERPL-MCNC: 1.2 MG/DL
BUN SERPL-MCNC: 15 MG/DL
CALCIUM SERPL-MCNC: 9.3 MG/DL
CHLORIDE SERPL-SCNC: 103 MMOL/L
CO2 SERPL-SCNC: 28 MMOL/L
CREAT SERPL-MCNC: 1.16 MG/DL
GLUCOSE SERPL-MCNC: 115 MG/DL
MAGNESIUM SERPL-MCNC: 2.1 MG/DL
POTASSIUM SERPL-SCNC: 4.5 MMOL/L
PROT SERPL-MCNC: 5.9 G/DL
SODIUM SERPL-SCNC: 143 MMOL/L

## 2017-12-04 ENCOUNTER — OUTPATIENT (OUTPATIENT)
Dept: OUTPATIENT SERVICES | Facility: HOSPITAL | Age: 82
LOS: 1 days | Discharge: HOME | End: 2017-12-04

## 2017-12-04 VITALS
TEMPERATURE: 98.1 F | HEART RATE: 65 BPM | DIASTOLIC BLOOD PRESSURE: 73 MMHG | OXYGEN SATURATION: 99 % | SYSTOLIC BLOOD PRESSURE: 131 MMHG | WEIGHT: 157 LBS | BODY MASS INDEX: 24.27 KG/M2 | RESPIRATION RATE: 16 BRPM

## 2017-12-04 DIAGNOSIS — C20 MALIGNANT NEOPLASM OF RECTUM: ICD-10-CM

## 2017-12-04 DIAGNOSIS — Z98.42 CATARACT EXTRACTION STATUS, LEFT EYE: Chronic | ICD-10-CM

## 2017-12-04 DIAGNOSIS — Z98.41 CATARACT EXTRACTION STATUS, RIGHT EYE: Chronic | ICD-10-CM

## 2017-12-06 PROCEDURE — 77427 RADIATION TX MANAGEMENT X5: CPT

## 2017-12-07 ENCOUNTER — RESULT REVIEW (OUTPATIENT)
Age: 82
End: 2017-12-07

## 2017-12-07 ENCOUNTER — APPOINTMENT (OUTPATIENT)
Dept: HEMATOLOGY ONCOLOGY | Facility: CLINIC | Age: 82
End: 2017-12-07
Payer: MEDICARE

## 2017-12-07 VITALS — SYSTOLIC BLOOD PRESSURE: 130 MMHG | DIASTOLIC BLOOD PRESSURE: 81 MMHG | HEART RATE: 80 BPM | OXYGEN SATURATION: 98 %

## 2017-12-07 DIAGNOSIS — R91.1 SOLITARY PULMONARY NODULE: ICD-10-CM

## 2017-12-07 LAB
BASOPHILS # BLD AUTO: 0 K/UL — SIGNIFICANT CHANGE UP (ref 0–0.2)
BASOPHILS NFR BLD AUTO: 0.8 % — SIGNIFICANT CHANGE UP (ref 0–2)
EOSINOPHIL # BLD AUTO: 0.1 K/UL — SIGNIFICANT CHANGE UP (ref 0–0.5)
EOSINOPHIL NFR BLD AUTO: 1.9 % — SIGNIFICANT CHANGE UP (ref 0–6)
HCT VFR BLD CALC: 41 % — SIGNIFICANT CHANGE UP (ref 39–50)
HGB BLD-MCNC: 13.4 G/DL — SIGNIFICANT CHANGE UP (ref 13–17)
LYMPHOCYTES # BLD AUTO: 0.5 K/UL — LOW (ref 1–3.3)
LYMPHOCYTES # BLD AUTO: 10.9 % — LOW (ref 13–44)
MCHC RBC-ENTMCNC: 29.6 PG — SIGNIFICANT CHANGE UP (ref 27–34)
MCHC RBC-ENTMCNC: 32.6 GM/DL — SIGNIFICANT CHANGE UP (ref 32–36)
MCV RBC AUTO: 90.8 FL — SIGNIFICANT CHANGE UP (ref 80–100)
MONOCYTES # BLD AUTO: 0.3 K/UL — SIGNIFICANT CHANGE UP (ref 0–0.9)
MONOCYTES NFR BLD AUTO: 7.8 % — SIGNIFICANT CHANGE UP (ref 2–14)
NEUTROPHILS # BLD AUTO: 3.3 K/UL — SIGNIFICANT CHANGE UP (ref 1.8–7.4)
NEUTROPHILS NFR BLD AUTO: 78.6 % — HIGH (ref 43–77)
PLATELET # BLD AUTO: 203 K/UL — SIGNIFICANT CHANGE UP (ref 150–400)
RBC # BLD: 4.52 M/UL — SIGNIFICANT CHANGE UP (ref 4.2–5.8)
RBC # FLD: 12.2 % — SIGNIFICANT CHANGE UP (ref 10.3–14.5)
WBC # BLD: 4.3 K/UL — SIGNIFICANT CHANGE UP (ref 3.8–10.5)
WBC # FLD AUTO: 4.3 K/UL — SIGNIFICANT CHANGE UP (ref 3.8–10.5)

## 2017-12-07 PROCEDURE — 99215 OFFICE O/P EST HI 40 MIN: CPT

## 2017-12-08 LAB
ALBUMIN SERPL ELPH-MCNC: 3.9 G/DL
ALP BLD-CCNC: 50 U/L
ALT SERPL-CCNC: 9 U/L
ANION GAP SERPL CALC-SCNC: 12 MMOL/L
AST SERPL-CCNC: 16 U/L
BILIRUB SERPL-MCNC: 1.9 MG/DL
BUN SERPL-MCNC: 20 MG/DL
CALCIUM SERPL-MCNC: 9.2 MG/DL
CHLORIDE SERPL-SCNC: 101 MMOL/L
CO2 SERPL-SCNC: 29 MMOL/L
CREAT SERPL-MCNC: 1.12 MG/DL
GLUCOSE SERPL-MCNC: 115 MG/DL
MAGNESIUM SERPL-MCNC: 2.1 MG/DL
POTASSIUM SERPL-SCNC: 4.5 MMOL/L
PROT SERPL-MCNC: 6.1 G/DL
SODIUM SERPL-SCNC: 142 MMOL/L

## 2017-12-11 VITALS
SYSTOLIC BLOOD PRESSURE: 157 MMHG | WEIGHT: 156 LBS | HEART RATE: 59 BPM | BODY MASS INDEX: 24.48 KG/M2 | TEMPERATURE: 98 F | HEIGHT: 67 IN | OXYGEN SATURATION: 100 % | RESPIRATION RATE: 16 BRPM | DIASTOLIC BLOOD PRESSURE: 75 MMHG

## 2017-12-13 ENCOUNTER — RESULT REVIEW (OUTPATIENT)
Age: 82
End: 2017-12-13

## 2017-12-13 ENCOUNTER — APPOINTMENT (OUTPATIENT)
Dept: HEMATOLOGY ONCOLOGY | Facility: CLINIC | Age: 82
End: 2017-12-13
Payer: MEDICARE

## 2017-12-13 VITALS
TEMPERATURE: 97.8 F | DIASTOLIC BLOOD PRESSURE: 73 MMHG | OXYGEN SATURATION: 96 % | SYSTOLIC BLOOD PRESSURE: 124 MMHG | HEART RATE: 64 BPM | WEIGHT: 155.18 LBS | BODY MASS INDEX: 24.31 KG/M2

## 2017-12-13 DIAGNOSIS — R74.8 ABNORMAL LEVELS OF OTHER SERUM ENZYMES: ICD-10-CM

## 2017-12-13 LAB
BASOPHILS # BLD AUTO: 0 K/UL — SIGNIFICANT CHANGE UP (ref 0–0.2)
BASOPHILS NFR BLD AUTO: 0.5 % — SIGNIFICANT CHANGE UP (ref 0–2)
EOSINOPHIL # BLD AUTO: 0.2 K/UL — SIGNIFICANT CHANGE UP (ref 0–0.5)
EOSINOPHIL NFR BLD AUTO: 5 % — SIGNIFICANT CHANGE UP (ref 0–6)
HCT VFR BLD CALC: 42.3 % — SIGNIFICANT CHANGE UP (ref 39–50)
HGB BLD-MCNC: 13.9 G/DL — SIGNIFICANT CHANGE UP (ref 13–17)
LYMPHOCYTES # BLD AUTO: 0.5 K/UL — LOW (ref 1–3.3)
LYMPHOCYTES # BLD AUTO: 10.6 % — LOW (ref 13–44)
MCHC RBC-ENTMCNC: 30 PG — SIGNIFICANT CHANGE UP (ref 27–34)
MCHC RBC-ENTMCNC: 32.9 GM/DL — SIGNIFICANT CHANGE UP (ref 32–36)
MCV RBC AUTO: 91.2 FL — SIGNIFICANT CHANGE UP (ref 80–100)
MONOCYTES # BLD AUTO: 0.4 K/UL — SIGNIFICANT CHANGE UP (ref 0–0.9)
MONOCYTES NFR BLD AUTO: 9.1 % — SIGNIFICANT CHANGE UP (ref 2–14)
NEUTROPHILS # BLD AUTO: 3.4 K/UL — SIGNIFICANT CHANGE UP (ref 1.8–7.4)
NEUTROPHILS NFR BLD AUTO: 74.8 % — SIGNIFICANT CHANGE UP (ref 43–77)
PLATELET # BLD AUTO: 174 K/UL — SIGNIFICANT CHANGE UP (ref 150–400)
RBC # BLD: 4.63 M/UL — SIGNIFICANT CHANGE UP (ref 4.2–5.8)
RBC # FLD: 12.8 % — SIGNIFICANT CHANGE UP (ref 10.3–14.5)
WBC # BLD: 4.5 K/UL — SIGNIFICANT CHANGE UP (ref 3.8–10.5)
WBC # FLD AUTO: 4.5 K/UL — SIGNIFICANT CHANGE UP (ref 3.8–10.5)

## 2017-12-13 PROCEDURE — 77427 RADIATION TX MANAGEMENT X5: CPT

## 2017-12-13 PROCEDURE — 99215 OFFICE O/P EST HI 40 MIN: CPT

## 2017-12-14 LAB
ALBUMIN SERPL ELPH-MCNC: 3.9 G/DL
ALP BLD-CCNC: 50 U/L
ALT SERPL-CCNC: 8 U/L
ANION GAP SERPL CALC-SCNC: 15 MMOL/L
AST SERPL-CCNC: 12 U/L
BILIRUB SERPL-MCNC: 1.3 MG/DL
BUN SERPL-MCNC: 17 MG/DL
CALCIUM SERPL-MCNC: 9.4 MG/DL
CHLORIDE SERPL-SCNC: 101 MMOL/L
CO2 SERPL-SCNC: 28 MMOL/L
CREAT SERPL-MCNC: 1.1 MG/DL
GLUCOSE SERPL-MCNC: 104 MG/DL
MAGNESIUM SERPL-MCNC: 2.1 MG/DL
POTASSIUM SERPL-SCNC: 4.9 MMOL/L
PROT SERPL-MCNC: 6.3 G/DL
SODIUM SERPL-SCNC: 144 MMOL/L

## 2017-12-18 VITALS
DIASTOLIC BLOOD PRESSURE: 60 MMHG | HEART RATE: 63 BPM | WEIGHT: 155 LBS | BODY MASS INDEX: 24.33 KG/M2 | OXYGEN SATURATION: 94 % | TEMPERATURE: 98.6 F | HEIGHT: 67 IN | SYSTOLIC BLOOD PRESSURE: 145 MMHG | RESPIRATION RATE: 16 BRPM

## 2017-12-20 ENCOUNTER — RESULT REVIEW (OUTPATIENT)
Age: 82
End: 2017-12-20

## 2017-12-20 ENCOUNTER — APPOINTMENT (OUTPATIENT)
Dept: HEMATOLOGY ONCOLOGY | Facility: CLINIC | Age: 82
End: 2017-12-20

## 2017-12-20 ENCOUNTER — APPOINTMENT (OUTPATIENT)
Dept: HEMATOLOGY ONCOLOGY | Facility: CLINIC | Age: 82
End: 2017-12-20
Payer: MEDICARE

## 2017-12-20 VITALS
TEMPERATURE: 98.1 F | WEIGHT: 157.96 LBS | HEART RATE: 57 BPM | OXYGEN SATURATION: 98 % | BODY MASS INDEX: 24.74 KG/M2 | DIASTOLIC BLOOD PRESSURE: 81 MMHG | SYSTOLIC BLOOD PRESSURE: 142 MMHG

## 2017-12-20 LAB
BASOPHILS # BLD AUTO: 0 K/UL — SIGNIFICANT CHANGE UP (ref 0–0.2)
BASOPHILS NFR BLD AUTO: 0.8 % — SIGNIFICANT CHANGE UP (ref 0–2)
EOSINOPHIL # BLD AUTO: 0.2 K/UL — SIGNIFICANT CHANGE UP (ref 0–0.5)
EOSINOPHIL NFR BLD AUTO: 5.7 % — SIGNIFICANT CHANGE UP (ref 0–6)
HCT VFR BLD CALC: 41.3 % — SIGNIFICANT CHANGE UP (ref 39–50)
HGB BLD-MCNC: 13.4 G/DL — SIGNIFICANT CHANGE UP (ref 13–17)
LYMPHOCYTES # BLD AUTO: 0.4 K/UL — LOW (ref 1–3.3)
LYMPHOCYTES # BLD AUTO: 9.1 % — LOW (ref 13–44)
MCHC RBC-ENTMCNC: 29.8 PG — SIGNIFICANT CHANGE UP (ref 27–34)
MCHC RBC-ENTMCNC: 32.4 GM/DL — SIGNIFICANT CHANGE UP (ref 32–36)
MCV RBC AUTO: 91.9 FL — SIGNIFICANT CHANGE UP (ref 80–100)
MONOCYTES # BLD AUTO: 0.4 K/UL — SIGNIFICANT CHANGE UP (ref 0–0.9)
MONOCYTES NFR BLD AUTO: 9.4 % — SIGNIFICANT CHANGE UP (ref 2–14)
NEUTROPHILS # BLD AUTO: 2.9 K/UL — SIGNIFICANT CHANGE UP (ref 1.8–7.4)
NEUTROPHILS NFR BLD AUTO: 75 % — SIGNIFICANT CHANGE UP (ref 43–77)
PLATELET # BLD AUTO: 175 K/UL — SIGNIFICANT CHANGE UP (ref 150–400)
RBC # BLD: 4.49 M/UL — SIGNIFICANT CHANGE UP (ref 4.2–5.8)
RBC # FLD: 14.8 % — HIGH (ref 10.3–14.5)
WBC # BLD: 3.9 K/UL — SIGNIFICANT CHANGE UP (ref 3.8–10.5)
WBC # FLD AUTO: 3.9 K/UL — SIGNIFICANT CHANGE UP (ref 3.8–10.5)

## 2017-12-20 PROCEDURE — 99214 OFFICE O/P EST MOD 30 MIN: CPT

## 2017-12-21 LAB
ALBUMIN SERPL ELPH-MCNC: 3.5 G/DL
ALP BLD-CCNC: 44 U/L
ALT SERPL-CCNC: 6 U/L
ANION GAP SERPL CALC-SCNC: 13 MMOL/L
AST SERPL-CCNC: 13 U/L
BILIRUB SERPL-MCNC: 1.2 MG/DL
BUN SERPL-MCNC: 15 MG/DL
CALCIUM SERPL-MCNC: 9 MG/DL
CEA SERPL-MCNC: 5.6 NG/ML
CHLORIDE SERPL-SCNC: 103 MMOL/L
CO2 SERPL-SCNC: 29 MMOL/L
CREAT SERPL-MCNC: 1.09 MG/DL
GLUCOSE SERPL-MCNC: 120 MG/DL
MAGNESIUM SERPL-MCNC: 2.1 MG/DL
POTASSIUM SERPL-SCNC: 4.6 MMOL/L
PROT SERPL-MCNC: 6.2 G/DL
SODIUM SERPL-SCNC: 145 MMOL/L

## 2017-12-21 PROCEDURE — 77427 RADIATION TX MANAGEMENT X5: CPT

## 2017-12-26 VITALS
TEMPERATURE: 98 F | HEIGHT: 67 IN | SYSTOLIC BLOOD PRESSURE: 138 MMHG | WEIGHT: 158 LBS | RESPIRATION RATE: 16 BRPM | HEART RATE: 62 BPM | DIASTOLIC BLOOD PRESSURE: 65 MMHG | BODY MASS INDEX: 24.8 KG/M2

## 2017-12-29 PROCEDURE — 77427 RADIATION TX MANAGEMENT X5: CPT

## 2018-01-02 VITALS
BODY MASS INDEX: 23.96 KG/M2 | DIASTOLIC BLOOD PRESSURE: 71 MMHG | TEMPERATURE: 98 F | HEART RATE: 63 BPM | RESPIRATION RATE: 16 BRPM | SYSTOLIC BLOOD PRESSURE: 133 MMHG | OXYGEN SATURATION: 97 % | WEIGHT: 153 LBS

## 2018-01-02 PROBLEM — R91.1 PULMONARY NODULE SEEN ON IMAGING STUDY: Status: ACTIVE | Noted: 2017-11-13

## 2018-01-02 PROCEDURE — 77427 RADIATION TX MANAGEMENT X5: CPT

## 2018-01-03 PROCEDURE — 77280 THER RAD SIMULAJ FIELD SMPL: CPT | Mod: 26

## 2018-01-08 ENCOUNTER — RESULT REVIEW (OUTPATIENT)
Age: 83
End: 2018-01-08

## 2018-01-08 ENCOUNTER — APPOINTMENT (OUTPATIENT)
Dept: HEMATOLOGY ONCOLOGY | Facility: CLINIC | Age: 83
End: 2018-01-08
Payer: MEDICARE

## 2018-01-08 ENCOUNTER — LABORATORY RESULT (OUTPATIENT)
Age: 83
End: 2018-01-08

## 2018-01-08 ENCOUNTER — OUTPATIENT (OUTPATIENT)
Dept: OUTPATIENT SERVICES | Facility: HOSPITAL | Age: 83
LOS: 1 days | Discharge: HOME | End: 2018-01-08

## 2018-01-08 VITALS
BODY MASS INDEX: 23.72 KG/M2 | WEIGHT: 151.46 LBS | HEART RATE: 69 BPM | TEMPERATURE: 97.9 F | DIASTOLIC BLOOD PRESSURE: 68 MMHG | SYSTOLIC BLOOD PRESSURE: 107 MMHG | OXYGEN SATURATION: 98 %

## 2018-01-08 DIAGNOSIS — K52.1 TOXIC GASTROENTERITIS AND COLITIS: ICD-10-CM

## 2018-01-08 DIAGNOSIS — C20 MALIGNANT NEOPLASM OF RECTUM: ICD-10-CM

## 2018-01-08 DIAGNOSIS — Z98.42 CATARACT EXTRACTION STATUS, LEFT EYE: Chronic | ICD-10-CM

## 2018-01-08 DIAGNOSIS — Z98.41 CATARACT EXTRACTION STATUS, RIGHT EYE: Chronic | ICD-10-CM

## 2018-01-08 DIAGNOSIS — T45.1X5A TOXIC GASTROENTERITIS AND COLITIS: ICD-10-CM

## 2018-01-08 DIAGNOSIS — R31.9 HEMATURIA, UNSPECIFIED: ICD-10-CM

## 2018-01-08 LAB
BASOPHILS # BLD AUTO: 0.1 K/UL — SIGNIFICANT CHANGE UP (ref 0–0.2)
BASOPHILS NFR BLD AUTO: 1.1 % — SIGNIFICANT CHANGE UP (ref 0–2)
EOSINOPHIL # BLD AUTO: 0.2 K/UL — SIGNIFICANT CHANGE UP (ref 0–0.5)
EOSINOPHIL NFR BLD AUTO: 4.5 % — SIGNIFICANT CHANGE UP (ref 0–6)
HCT VFR BLD CALC: 39.6 % — SIGNIFICANT CHANGE UP (ref 39–50)
HGB BLD-MCNC: 13.5 G/DL — SIGNIFICANT CHANGE UP (ref 13–17)
LYMPHOCYTES # BLD AUTO: 0.2 K/UL — LOW (ref 1–3.3)
LYMPHOCYTES # BLD AUTO: 3.7 % — LOW (ref 13–44)
MCHC RBC-ENTMCNC: 31.8 PG — SIGNIFICANT CHANGE UP (ref 27–34)
MCHC RBC-ENTMCNC: 34.2 GM/DL — SIGNIFICANT CHANGE UP (ref 32–36)
MCV RBC AUTO: 93.1 FL — SIGNIFICANT CHANGE UP (ref 80–100)
MONOCYTES # BLD AUTO: 0.6 K/UL — SIGNIFICANT CHANGE UP (ref 0–0.9)
MONOCYTES NFR BLD AUTO: 11.7 % — SIGNIFICANT CHANGE UP (ref 2–14)
NEUTROPHILS # BLD AUTO: 4.2 K/UL — SIGNIFICANT CHANGE UP (ref 1.8–7.4)
NEUTROPHILS NFR BLD AUTO: 79 % — HIGH (ref 43–77)
PLATELET # BLD AUTO: 206 K/UL — SIGNIFICANT CHANGE UP (ref 150–400)
RBC # BLD: 4.25 M/UL — SIGNIFICANT CHANGE UP (ref 4.2–5.8)
RBC # FLD: 16.2 % — HIGH (ref 10.3–14.5)
WBC # BLD: 5.3 K/UL — SIGNIFICANT CHANGE UP (ref 3.8–10.5)
WBC # FLD AUTO: 5.3 K/UL — SIGNIFICANT CHANGE UP (ref 3.8–10.5)

## 2018-01-08 PROCEDURE — 99214 OFFICE O/P EST MOD 30 MIN: CPT

## 2018-01-09 LAB
ALBUMIN SERPL ELPH-MCNC: 3.7 G/DL
ALP BLD-CCNC: 45 U/L
ALT SERPL-CCNC: 7 U/L
ANION GAP SERPL CALC-SCNC: 12 MMOL/L
APPEARANCE: CLEAR
AST SERPL-CCNC: 16 U/L
BILIRUB SERPL-MCNC: 1.2 MG/DL
BILIRUBIN URINE: ABNORMAL
BLOOD URINE: NEGATIVE
BUN SERPL-MCNC: 14 MG/DL
CALCIUM SERPL-MCNC: 9.1 MG/DL
CHLORIDE SERPL-SCNC: 100 MMOL/L
CO2 SERPL-SCNC: 31 MMOL/L
COLOR: ABNORMAL
CREAT SERPL-MCNC: 1.27 MG/DL
GLUCOSE QUALITATIVE U: NEGATIVE MG/DL
GLUCOSE SERPL-MCNC: 124 MG/DL
KETONES URINE: NEGATIVE
LEUKOCYTE ESTERASE URINE: ABNORMAL
MAGNESIUM SERPL-MCNC: 2.1 MG/DL
NITRITE URINE: NEGATIVE
PH URINE: 5.5
POTASSIUM SERPL-SCNC: 4.4 MMOL/L
PROT SERPL-MCNC: 5.8 G/DL
PROTEIN URINE: NEGATIVE MG/DL
SODIUM SERPL-SCNC: 143 MMOL/L
SPECIFIC GRAVITY URINE: 1.02
UROBILINOGEN URINE: 2 MG/DL

## 2018-01-25 ENCOUNTER — RESULT REVIEW (OUTPATIENT)
Age: 83
End: 2018-01-25

## 2018-01-25 ENCOUNTER — APPOINTMENT (OUTPATIENT)
Dept: HEMATOLOGY ONCOLOGY | Facility: CLINIC | Age: 83
End: 2018-01-25
Payer: MEDICARE

## 2018-01-25 VITALS
BODY MASS INDEX: 23.6 KG/M2 | DIASTOLIC BLOOD PRESSURE: 81 MMHG | TEMPERATURE: 97.7 F | OXYGEN SATURATION: 98 % | SYSTOLIC BLOOD PRESSURE: 158 MMHG | HEART RATE: 58 BPM | WEIGHT: 150.66 LBS

## 2018-01-25 DIAGNOSIS — R32 UNSPECIFIED URINARY INCONTINENCE: ICD-10-CM

## 2018-01-25 DIAGNOSIS — R53.83 OTHER FATIGUE: ICD-10-CM

## 2018-01-25 DIAGNOSIS — L89.212 PRESSURE ULCER OF RIGHT HIP, STAGE 2: ICD-10-CM

## 2018-01-25 LAB
BASOPHILS # BLD AUTO: 0 K/UL — SIGNIFICANT CHANGE UP (ref 0–0.2)
BASOPHILS NFR BLD AUTO: 1 % — SIGNIFICANT CHANGE UP (ref 0–2)
EOSINOPHIL # BLD AUTO: 0.1 K/UL — SIGNIFICANT CHANGE UP (ref 0–0.5)
EOSINOPHIL NFR BLD AUTO: 3 % — SIGNIFICANT CHANGE UP (ref 0–6)
HCT VFR BLD CALC: 42 % — SIGNIFICANT CHANGE UP (ref 39–50)
HGB BLD-MCNC: 13.7 G/DL — SIGNIFICANT CHANGE UP (ref 13–17)
LYMPHOCYTES # BLD AUTO: 0.3 K/UL — LOW (ref 1–3.3)
LYMPHOCYTES # BLD AUTO: 5.9 % — LOW (ref 13–44)
MCHC RBC-ENTMCNC: 30.6 PG — SIGNIFICANT CHANGE UP (ref 27–34)
MCHC RBC-ENTMCNC: 32.6 GM/DL — SIGNIFICANT CHANGE UP (ref 32–36)
MCV RBC AUTO: 93.9 FL — SIGNIFICANT CHANGE UP (ref 80–100)
MONOCYTES # BLD AUTO: 0.4 K/UL — SIGNIFICANT CHANGE UP (ref 0–0.9)
MONOCYTES NFR BLD AUTO: 8.2 % — SIGNIFICANT CHANGE UP (ref 2–14)
NEUTROPHILS # BLD AUTO: 3.9 K/UL — SIGNIFICANT CHANGE UP (ref 1.8–7.4)
NEUTROPHILS NFR BLD AUTO: 82 % — HIGH (ref 43–77)
PLATELET # BLD AUTO: 209 K/UL — SIGNIFICANT CHANGE UP (ref 150–400)
RBC # BLD: 4.48 M/UL — SIGNIFICANT CHANGE UP (ref 4.2–5.8)
RBC # FLD: 15.8 % — HIGH (ref 10.3–14.5)
WBC # BLD: 4.8 K/UL — SIGNIFICANT CHANGE UP (ref 3.8–10.5)
WBC # FLD AUTO: 4.8 K/UL — SIGNIFICANT CHANGE UP (ref 3.8–10.5)

## 2018-01-25 PROCEDURE — 99215 OFFICE O/P EST HI 40 MIN: CPT

## 2018-01-26 NOTE — PROGRESS NOTE BEHAVIORAL HEALTH - OTHER
Richareji Mccormick was seen today for diabetes and hypertension.     Diagnoses and all orders for this visit:    Type 2 diabetes mellitus without complication, without long-term current use of insulin (HCC)  -     POCT glycosylated hemoglobin (Hb A1C)  AIC 8.0-increase Metf to twice a day-lower carbs  Essential hypertension  Continue meds-ESDRAS diet  Gastroesophageal reflux disease with esophagitis  Continue meds-limit alcohol    See me 3 mos
not assessed

## 2018-01-30 LAB
ALBUMIN SERPL ELPH-MCNC: 3.6 G/DL
ALP BLD-CCNC: 51 U/L
ALT SERPL-CCNC: 8 U/L
ANION GAP SERPL CALC-SCNC: 13 MMOL/L
AST SERPL-CCNC: 14 U/L
BILIRUB SERPL-MCNC: 2 MG/DL
BUN SERPL-MCNC: 13 MG/DL
CALCIUM SERPL-MCNC: 9.3 MG/DL
CHLORIDE SERPL-SCNC: 100 MMOL/L
CO2 SERPL-SCNC: 30 MMOL/L
CREAT SERPL-MCNC: 1.12 MG/DL
GLUCOSE SERPL-MCNC: 113 MG/DL
MAGNESIUM SERPL-MCNC: 2 MG/DL
POTASSIUM SERPL-SCNC: 4.5 MMOL/L
PROT SERPL-MCNC: 5.9 G/DL
SODIUM SERPL-SCNC: 143 MMOL/L

## 2018-02-09 ENCOUNTER — RX RENEWAL (OUTPATIENT)
Age: 83
End: 2018-02-09

## 2018-02-09 ENCOUNTER — OUTPATIENT (OUTPATIENT)
Dept: OUTPATIENT SERVICES | Facility: HOSPITAL | Age: 83
LOS: 1 days | Discharge: HOME | End: 2018-02-09

## 2018-02-09 DIAGNOSIS — Z98.41 CATARACT EXTRACTION STATUS, RIGHT EYE: Chronic | ICD-10-CM

## 2018-02-09 DIAGNOSIS — C20 MALIGNANT NEOPLASM OF RECTUM: ICD-10-CM

## 2018-02-09 DIAGNOSIS — Z98.42 CATARACT EXTRACTION STATUS, LEFT EYE: Chronic | ICD-10-CM

## 2018-02-12 ENCOUNTER — APPOINTMENT (OUTPATIENT)
Dept: HEMATOLOGY ONCOLOGY | Facility: CLINIC | Age: 83
End: 2018-02-12

## 2018-02-14 ENCOUNTER — OTHER (OUTPATIENT)
Age: 83
End: 2018-02-14

## 2018-02-15 ENCOUNTER — APPOINTMENT (OUTPATIENT)
Dept: RADIATION ONCOLOGY | Facility: CLINIC | Age: 83
End: 2018-02-15
Payer: MEDICARE

## 2018-02-15 ENCOUNTER — APPOINTMENT (OUTPATIENT)
Dept: HEMATOLOGY ONCOLOGY | Facility: CLINIC | Age: 83
End: 2018-02-15

## 2018-02-15 ENCOUNTER — RESULT REVIEW (OUTPATIENT)
Age: 83
End: 2018-02-15

## 2018-02-15 ENCOUNTER — LABORATORY RESULT (OUTPATIENT)
Age: 83
End: 2018-02-15

## 2018-02-15 VITALS
OXYGEN SATURATION: 95 % | DIASTOLIC BLOOD PRESSURE: 69 MMHG | RESPIRATION RATE: 16 BRPM | TEMPERATURE: 98 F | HEIGHT: 67 IN | WEIGHT: 139 LBS | HEART RATE: 65 BPM | SYSTOLIC BLOOD PRESSURE: 109 MMHG | BODY MASS INDEX: 21.82 KG/M2

## 2018-02-15 LAB
BASOPHILS # BLD AUTO: 0.1 K/UL — SIGNIFICANT CHANGE UP (ref 0–0.2)
BASOPHILS NFR BLD AUTO: 1 % — SIGNIFICANT CHANGE UP (ref 0–2)
EOSINOPHIL # BLD AUTO: 0.1 K/UL — SIGNIFICANT CHANGE UP (ref 0–0.5)
EOSINOPHIL NFR BLD AUTO: 2.2 % — SIGNIFICANT CHANGE UP (ref 0–6)
HCT VFR BLD CALC: 42.1 % — SIGNIFICANT CHANGE UP (ref 39–50)
HGB BLD-MCNC: 14.2 G/DL — SIGNIFICANT CHANGE UP (ref 13–17)
LYMPHOCYTES # BLD AUTO: 0.4 K/UL — LOW (ref 1–3.3)
LYMPHOCYTES # BLD AUTO: 8 % — LOW (ref 13–44)
MCHC RBC-ENTMCNC: 32.6 PG — SIGNIFICANT CHANGE UP (ref 27–34)
MCHC RBC-ENTMCNC: 33.8 GM/DL — SIGNIFICANT CHANGE UP (ref 32–36)
MCV RBC AUTO: 96.3 FL — SIGNIFICANT CHANGE UP (ref 80–100)
MONOCYTES # BLD AUTO: 0.4 K/UL — SIGNIFICANT CHANGE UP (ref 0–0.9)
MONOCYTES NFR BLD AUTO: 6.8 % — SIGNIFICANT CHANGE UP (ref 2–14)
NEUTROPHILS # BLD AUTO: 4.3 K/UL — SIGNIFICANT CHANGE UP (ref 1.8–7.4)
NEUTROPHILS NFR BLD AUTO: 82 % — HIGH (ref 43–77)
PLATELET # BLD AUTO: 186 K/UL — SIGNIFICANT CHANGE UP (ref 150–400)
RBC # BLD: 4.37 M/UL — SIGNIFICANT CHANGE UP (ref 4.2–5.8)
RBC # FLD: 14.5 % — SIGNIFICANT CHANGE UP (ref 10.3–14.5)
WBC # BLD: 5.2 K/UL — SIGNIFICANT CHANGE UP (ref 3.8–10.5)
WBC # FLD AUTO: 5.2 K/UL — SIGNIFICANT CHANGE UP (ref 3.8–10.5)

## 2018-02-15 PROCEDURE — 99213 OFFICE O/P EST LOW 20 MIN: CPT

## 2018-02-16 LAB
ALBUMIN SERPL ELPH-MCNC: 3.8 G/DL
ALP BLD-CCNC: 57 U/L
ALT SERPL-CCNC: 10 U/L
ANION GAP SERPL CALC-SCNC: 16 MMOL/L
AST SERPL-CCNC: 16 U/L
BILIRUB SERPL-MCNC: 1.5 MG/DL
BUN SERPL-MCNC: 18 MG/DL
CALCIUM SERPL-MCNC: 9.3 MG/DL
CHLORIDE SERPL-SCNC: 102 MMOL/L
CO2 SERPL-SCNC: 26 MMOL/L
CREAT SERPL-MCNC: 1.15 MG/DL
GLUCOSE SERPL-MCNC: 144 MG/DL
MAGNESIUM SERPL-MCNC: 2.2 MG/DL
POTASSIUM SERPL-SCNC: 6.2 MMOL/L
PROT SERPL-MCNC: 6.4 G/DL
SODIUM SERPL-SCNC: 144 MMOL/L

## 2018-02-20 ENCOUNTER — OTHER (OUTPATIENT)
Age: 83
End: 2018-02-20

## 2018-02-22 ENCOUNTER — RESULT REVIEW (OUTPATIENT)
Age: 83
End: 2018-02-22

## 2018-02-22 ENCOUNTER — LABORATORY RESULT (OUTPATIENT)
Age: 83
End: 2018-02-22

## 2018-02-22 ENCOUNTER — APPOINTMENT (OUTPATIENT)
Dept: HEMATOLOGY ONCOLOGY | Facility: CLINIC | Age: 83
End: 2018-02-22

## 2018-02-22 LAB
BASOPHILS # BLD AUTO: 0 K/UL — SIGNIFICANT CHANGE UP (ref 0–0.2)
BASOPHILS NFR BLD AUTO: 1 % — SIGNIFICANT CHANGE UP (ref 0–2)
EOSINOPHIL # BLD AUTO: 0.2 K/UL — SIGNIFICANT CHANGE UP (ref 0–0.5)
EOSINOPHIL NFR BLD AUTO: 4.3 % — SIGNIFICANT CHANGE UP (ref 0–6)
HCT VFR BLD CALC: 38.6 % — LOW (ref 39–50)
HGB BLD-MCNC: 13.3 G/DL — SIGNIFICANT CHANGE UP (ref 13–17)
LYMPHOCYTES # BLD AUTO: 0.3 K/UL — LOW (ref 1–3.3)
LYMPHOCYTES # BLD AUTO: 8.2 % — LOW (ref 13–44)
MCHC RBC-ENTMCNC: 32.8 PG — SIGNIFICANT CHANGE UP (ref 27–34)
MCHC RBC-ENTMCNC: 34.4 GM/DL — SIGNIFICANT CHANGE UP (ref 32–36)
MCV RBC AUTO: 95.4 FL — SIGNIFICANT CHANGE UP (ref 80–100)
MONOCYTES # BLD AUTO: 0.3 K/UL — SIGNIFICANT CHANGE UP (ref 0–0.9)
MONOCYTES NFR BLD AUTO: 8 % — SIGNIFICANT CHANGE UP (ref 2–14)
NEUTROPHILS # BLD AUTO: 3.1 K/UL — SIGNIFICANT CHANGE UP (ref 1.8–7.4)
NEUTROPHILS NFR BLD AUTO: 78.5 % — HIGH (ref 43–77)
PLATELET # BLD AUTO: 162 K/UL — SIGNIFICANT CHANGE UP (ref 150–400)
RBC # BLD: 4.04 M/UL — LOW (ref 4.2–5.8)
RBC # FLD: 14 % — SIGNIFICANT CHANGE UP (ref 10.3–14.5)
WBC # BLD: 4 K/UL — SIGNIFICANT CHANGE UP (ref 3.8–10.5)
WBC # FLD AUTO: 4 K/UL — SIGNIFICANT CHANGE UP (ref 3.8–10.5)

## 2018-02-23 LAB
ALBUMIN SERPL ELPH-MCNC: 3.8 G/DL
ALP BLD-CCNC: 53 U/L
ALT SERPL-CCNC: 9 U/L
ANION GAP SERPL CALC-SCNC: 12 MMOL/L
AST SERPL-CCNC: 13 U/L
BILIRUB SERPL-MCNC: 1.6 MG/DL
BUN SERPL-MCNC: 14 MG/DL
CALCIUM SERPL-MCNC: 9.2 MG/DL
CHLORIDE SERPL-SCNC: 100 MMOL/L
CO2 SERPL-SCNC: 30 MMOL/L
CREAT SERPL-MCNC: 1.29 MG/DL
GLUCOSE SERPL-MCNC: 154 MG/DL
MAGNESIUM SERPL-MCNC: 2.1 MG/DL
POTASSIUM SERPL-SCNC: 4.6 MMOL/L
PROT SERPL-MCNC: 5.8 G/DL
SODIUM SERPL-SCNC: 142 MMOL/L

## 2018-04-09 ENCOUNTER — OUTPATIENT (OUTPATIENT)
Dept: OUTPATIENT SERVICES | Facility: HOSPITAL | Age: 83
LOS: 1 days | Discharge: HOME | End: 2018-04-09

## 2018-04-09 DIAGNOSIS — Z98.41 CATARACT EXTRACTION STATUS, RIGHT EYE: Chronic | ICD-10-CM

## 2018-04-09 DIAGNOSIS — C20 MALIGNANT NEOPLASM OF RECTUM: ICD-10-CM

## 2018-04-09 DIAGNOSIS — Z98.42 CATARACT EXTRACTION STATUS, LEFT EYE: Chronic | ICD-10-CM

## 2018-04-12 ENCOUNTER — RESULT REVIEW (OUTPATIENT)
Age: 83
End: 2018-04-12

## 2018-04-12 ENCOUNTER — APPOINTMENT (OUTPATIENT)
Dept: HEMATOLOGY ONCOLOGY | Facility: CLINIC | Age: 83
End: 2018-04-12
Payer: MEDICARE

## 2018-04-12 VITALS
DIASTOLIC BLOOD PRESSURE: 74 MMHG | BODY MASS INDEX: 24.26 KG/M2 | HEIGHT: 67 IN | OXYGEN SATURATION: 97 % | HEART RATE: 50 BPM | TEMPERATURE: 97.6 F | WEIGHT: 154.54 LBS | SYSTOLIC BLOOD PRESSURE: 157 MMHG

## 2018-04-12 LAB
BASOPHILS # BLD AUTO: 0 K/UL — SIGNIFICANT CHANGE UP (ref 0–0.2)
BASOPHILS NFR BLD AUTO: 1.2 % — SIGNIFICANT CHANGE UP (ref 0–2)
EOSINOPHIL # BLD AUTO: 0.2 K/UL — SIGNIFICANT CHANGE UP (ref 0–0.5)
EOSINOPHIL NFR BLD AUTO: 4.4 % — SIGNIFICANT CHANGE UP (ref 0–6)
HCT VFR BLD CALC: 45 % — SIGNIFICANT CHANGE UP (ref 39–50)
HGB BLD-MCNC: 14.9 G/DL — SIGNIFICANT CHANGE UP (ref 13–17)
LYMPHOCYTES # BLD AUTO: 0.4 K/UL — LOW (ref 1–3.3)
LYMPHOCYTES # BLD AUTO: 12.5 % — LOW (ref 13–44)
MCHC RBC-ENTMCNC: 31.5 PG — SIGNIFICANT CHANGE UP (ref 27–34)
MCHC RBC-ENTMCNC: 33.2 GM/DL — SIGNIFICANT CHANGE UP (ref 32–36)
MCV RBC AUTO: 94.8 FL — SIGNIFICANT CHANGE UP (ref 80–100)
MONOCYTES # BLD AUTO: 0.3 K/UL — SIGNIFICANT CHANGE UP (ref 0–0.9)
MONOCYTES NFR BLD AUTO: 9 % — SIGNIFICANT CHANGE UP (ref 2–14)
NEUTROPHILS # BLD AUTO: 2.6 K/UL — SIGNIFICANT CHANGE UP (ref 1.8–7.4)
NEUTROPHILS NFR BLD AUTO: 72.9 % — SIGNIFICANT CHANGE UP (ref 43–77)
PLATELET # BLD AUTO: 166 K/UL — SIGNIFICANT CHANGE UP (ref 150–400)
RBC # BLD: 4.74 M/UL — SIGNIFICANT CHANGE UP (ref 4.2–5.8)
RBC # FLD: 11.7 % — SIGNIFICANT CHANGE UP (ref 10.3–14.5)
WBC # BLD: 3.5 K/UL — LOW (ref 3.8–10.5)
WBC # FLD AUTO: 3.5 K/UL — LOW (ref 3.8–10.5)

## 2018-04-12 PROCEDURE — 99214 OFFICE O/P EST MOD 30 MIN: CPT

## 2018-04-14 ENCOUNTER — RX RENEWAL (OUTPATIENT)
Age: 83
End: 2018-04-14

## 2018-04-14 LAB
ALBUMIN SERPL ELPH-MCNC: 3.6 G/DL
ALP BLD-CCNC: 52 U/L
ALT SERPL-CCNC: 7 U/L
AST SERPL-CCNC: 14 U/L
BILIRUB SERPL-MCNC: 1.5 MG/DL
BUN SERPL-MCNC: 17 MG/DL
CALCIUM SERPL-MCNC: 8.6 MG/DL
CHLORIDE SERPL-SCNC: 101 MMOL/L
CO2 SERPL-SCNC: 22 MMOL/L
CREAT SERPL-MCNC: 1.12 MG/DL
GLUCOSE SERPL-MCNC: 101 MG/DL
MAGNESIUM SERPL-MCNC: 2 MG/DL
POTASSIUM SERPL-SCNC: 4.9 MMOL/L
PROT SERPL-MCNC: 6.3 G/DL
SODIUM SERPL-SCNC: 144 MMOL/L

## 2018-04-15 ENCOUNTER — FORM ENCOUNTER (OUTPATIENT)
Age: 83
End: 2018-04-15

## 2018-04-16 ENCOUNTER — OUTPATIENT (OUTPATIENT)
Dept: OUTPATIENT SERVICES | Facility: HOSPITAL | Age: 83
LOS: 1 days | End: 2018-04-16
Payer: MEDICARE

## 2018-04-16 ENCOUNTER — APPOINTMENT (OUTPATIENT)
Dept: MRI IMAGING | Facility: CLINIC | Age: 83
End: 2018-04-16
Payer: MEDICARE

## 2018-04-16 DIAGNOSIS — C20 MALIGNANT NEOPLASM OF RECTUM: ICD-10-CM

## 2018-04-16 DIAGNOSIS — Z98.41 CATARACT EXTRACTION STATUS, RIGHT EYE: Chronic | ICD-10-CM

## 2018-04-16 DIAGNOSIS — Z98.42 CATARACT EXTRACTION STATUS, LEFT EYE: Chronic | ICD-10-CM

## 2018-04-16 PROCEDURE — 72197 MRI PELVIS W/O & W/DYE: CPT

## 2018-04-16 PROCEDURE — 82565 ASSAY OF CREATININE: CPT

## 2018-04-16 PROCEDURE — 72197 MRI PELVIS W/O & W/DYE: CPT | Mod: 26

## 2018-04-16 PROCEDURE — A9585: CPT

## 2018-05-07 ENCOUNTER — APPOINTMENT (OUTPATIENT)
Dept: HEMATOLOGY ONCOLOGY | Facility: CLINIC | Age: 83
End: 2018-05-07

## 2018-05-17 ENCOUNTER — APPOINTMENT (OUTPATIENT)
Dept: RADIATION ONCOLOGY | Facility: CLINIC | Age: 83
End: 2018-05-17
Payer: MEDICARE

## 2018-05-17 VITALS
DIASTOLIC BLOOD PRESSURE: 67 MMHG | SYSTOLIC BLOOD PRESSURE: 103 MMHG | HEIGHT: 67 IN | HEART RATE: 69 BPM | WEIGHT: 155 LBS | OXYGEN SATURATION: 95 % | RESPIRATION RATE: 16 BRPM | BODY MASS INDEX: 24.33 KG/M2

## 2018-05-17 DIAGNOSIS — Z92.3 PERSONAL HISTORY OF IRRADIATION: ICD-10-CM

## 2018-05-17 PROCEDURE — 99213 OFFICE O/P EST LOW 20 MIN: CPT

## 2018-05-17 RX ORDER — PANTOPRAZOLE 40 MG/1
40 TABLET, DELAYED RELEASE ORAL
Qty: 30 | Refills: 0 | Status: DISCONTINUED | COMMUNITY
Start: 2017-10-13 | End: 2018-05-17

## 2018-05-17 RX ORDER — CEPHALEXIN 500 MG/1
500 CAPSULE ORAL 4 TIMES DAILY
Qty: 28 | Refills: 0 | Status: DISCONTINUED | COMMUNITY
Start: 2018-01-25 | End: 2018-05-17

## 2018-05-17 RX ORDER — POLYETHYLENE GLYCOL 3350 17 G/17G
17 POWDER, FOR SOLUTION ORAL
Qty: 28 | Refills: 0 | Status: DISCONTINUED | COMMUNITY
Start: 2017-07-25 | End: 2018-05-17

## 2018-05-17 RX ORDER — ONDANSETRON 8 MG/1
8 TABLET, ORALLY DISINTEGRATING ORAL EVERY 8 HOURS
Qty: 90 | Refills: 3 | Status: DISCONTINUED | COMMUNITY
Start: 2017-11-30 | End: 2018-05-17

## 2018-05-17 RX ORDER — LOPERAMIDE HCL 2 MG/1
2 TABLET, FILM COATED ORAL
Qty: 120 | Refills: 0 | Status: DISCONTINUED | COMMUNITY
Start: 2018-02-10 | End: 2018-05-17

## 2018-05-17 RX ORDER — POLYETHYLENE GLYCOL 3350 17 G/17G
17 POWDER, FOR SOLUTION ORAL
Qty: 527 | Refills: 0 | Status: DISCONTINUED | COMMUNITY
Start: 2017-08-09 | End: 2018-05-17

## 2018-05-17 RX ORDER — CAPECITABINE 500 MG/1
500 TABLET ORAL
Qty: 225 | Refills: 0 | Status: DISCONTINUED | COMMUNITY
Start: 2017-11-27 | End: 2018-05-17

## 2018-06-19 ENCOUNTER — OUTPATIENT (OUTPATIENT)
Dept: OUTPATIENT SERVICES | Facility: HOSPITAL | Age: 83
LOS: 1 days | Discharge: HOME | End: 2018-06-19

## 2018-06-19 DIAGNOSIS — Z98.42 CATARACT EXTRACTION STATUS, LEFT EYE: Chronic | ICD-10-CM

## 2018-06-19 DIAGNOSIS — Z98.41 CATARACT EXTRACTION STATUS, RIGHT EYE: Chronic | ICD-10-CM

## 2018-06-19 DIAGNOSIS — C20 MALIGNANT NEOPLASM OF RECTUM: ICD-10-CM

## 2018-06-21 ENCOUNTER — RESULT REVIEW (OUTPATIENT)
Age: 83
End: 2018-06-21

## 2018-06-21 ENCOUNTER — APPOINTMENT (OUTPATIENT)
Dept: HEMATOLOGY ONCOLOGY | Facility: CLINIC | Age: 83
End: 2018-06-21
Payer: MEDICARE

## 2018-06-21 VITALS
OXYGEN SATURATION: 97 % | SYSTOLIC BLOOD PRESSURE: 118 MMHG | WEIGHT: 153.99 LBS | RESPIRATION RATE: 55 BRPM | BODY MASS INDEX: 24.12 KG/M2 | DIASTOLIC BLOOD PRESSURE: 61 MMHG | TEMPERATURE: 97.6 F

## 2018-06-21 DIAGNOSIS — C20 MALIGNANT NEOPLASM OF RECTUM: ICD-10-CM

## 2018-06-21 LAB
BASOPHILS # BLD AUTO: 0 K/UL — SIGNIFICANT CHANGE UP (ref 0–0.2)
BASOPHILS NFR BLD AUTO: 0.5 % — SIGNIFICANT CHANGE UP (ref 0–2)
EOSINOPHIL # BLD AUTO: 0.1 K/UL — SIGNIFICANT CHANGE UP (ref 0–0.5)
EOSINOPHIL NFR BLD AUTO: 1.9 % — SIGNIFICANT CHANGE UP (ref 0–6)
HCT VFR BLD CALC: 43.4 % — SIGNIFICANT CHANGE UP (ref 39–50)
HGB BLD-MCNC: 14.3 G/DL — SIGNIFICANT CHANGE UP (ref 13–17)
LYMPHOCYTES # BLD AUTO: 0.4 K/UL — LOW (ref 1–3.3)
LYMPHOCYTES # BLD AUTO: 8.2 % — LOW (ref 13–44)
MCHC RBC-ENTMCNC: 30.6 PG — SIGNIFICANT CHANGE UP (ref 27–34)
MCHC RBC-ENTMCNC: 33 GM/DL — SIGNIFICANT CHANGE UP (ref 32–36)
MCV RBC AUTO: 92.8 FL — SIGNIFICANT CHANGE UP (ref 80–100)
MONOCYTES # BLD AUTO: 0.4 K/UL — SIGNIFICANT CHANGE UP (ref 0–0.9)
MONOCYTES NFR BLD AUTO: 8.5 % — SIGNIFICANT CHANGE UP (ref 2–14)
NEUTROPHILS # BLD AUTO: 3.7 K/UL — SIGNIFICANT CHANGE UP (ref 1.8–7.4)
NEUTROPHILS NFR BLD AUTO: 80.9 % — HIGH (ref 43–77)
PLATELET # BLD AUTO: 185 K/UL — SIGNIFICANT CHANGE UP (ref 150–400)
RBC # BLD: 4.68 M/UL — SIGNIFICANT CHANGE UP (ref 4.2–5.8)
RBC # FLD: 13.7 % — SIGNIFICANT CHANGE UP (ref 10.3–14.5)
WBC # BLD: 4.6 K/UL — SIGNIFICANT CHANGE UP (ref 3.8–10.5)
WBC # FLD AUTO: 4.6 K/UL — SIGNIFICANT CHANGE UP (ref 3.8–10.5)

## 2018-06-21 PROCEDURE — 99214 OFFICE O/P EST MOD 30 MIN: CPT

## 2018-06-22 LAB
ALBUMIN SERPL ELPH-MCNC: 3.7 G/DL
ALP BLD-CCNC: 56 U/L
ALT SERPL-CCNC: 12 U/L
ANION GAP SERPL CALC-SCNC: 11 MMOL/L
AST SERPL-CCNC: 18 U/L
BILIRUB SERPL-MCNC: 1.8 MG/DL
BUN SERPL-MCNC: 16 MG/DL
CALCIUM SERPL-MCNC: 8.9 MG/DL
CHLORIDE SERPL-SCNC: 102 MMOL/L
CO2 SERPL-SCNC: 30 MMOL/L
CREAT SERPL-MCNC: 1.17 MG/DL
GLUCOSE SERPL-MCNC: 98 MG/DL
MAGNESIUM SERPL-MCNC: 2.2 MG/DL
POTASSIUM SERPL-SCNC: 5.1 MMOL/L
PROT SERPL-MCNC: 6.1 G/DL
SODIUM SERPL-SCNC: 143 MMOL/L

## 2018-07-23 PROBLEM — Z86.73 HISTORY OF STROKE: Status: RESOLVED | Noted: 2017-11-08 | Resolved: 2018-07-23

## 2018-08-20 PROBLEM — L89.212: Status: ACTIVE | Noted: 2018-01-25

## 2018-09-14 ENCOUNTER — OUTPATIENT (OUTPATIENT)
Dept: OUTPATIENT SERVICES | Facility: HOSPITAL | Age: 83
LOS: 1 days | Discharge: HOME | End: 2018-09-14

## 2018-09-14 DIAGNOSIS — C20 MALIGNANT NEOPLASM OF RECTUM: ICD-10-CM

## 2018-09-14 DIAGNOSIS — Z98.42 CATARACT EXTRACTION STATUS, LEFT EYE: Chronic | ICD-10-CM

## 2018-09-14 DIAGNOSIS — Z98.41 CATARACT EXTRACTION STATUS, RIGHT EYE: Chronic | ICD-10-CM

## 2018-09-14 PROBLEM — I63.9 CEREBRAL INFARCTION, UNSPECIFIED: Chronic | Status: ACTIVE | Noted: 2017-11-09

## 2018-09-14 PROBLEM — E78.00 PURE HYPERCHOLESTEROLEMIA, UNSPECIFIED: Chronic | Status: ACTIVE | Noted: 2017-11-09

## 2018-09-14 PROBLEM — F32.9 MAJOR DEPRESSIVE DISORDER, SINGLE EPISODE, UNSPECIFIED: Chronic | Status: ACTIVE | Noted: 2017-11-09

## 2018-09-14 PROBLEM — K21.9 GASTRO-ESOPHAGEAL REFLUX DISEASE WITHOUT ESOPHAGITIS: Chronic | Status: ACTIVE | Noted: 2017-11-09

## 2018-09-14 PROBLEM — F03.90 UNSPECIFIED DEMENTIA WITHOUT BEHAVIORAL DISTURBANCE: Chronic | Status: ACTIVE | Noted: 2017-11-09

## 2018-09-17 ENCOUNTER — EMERGENCY (EMERGENCY)
Facility: HOSPITAL | Age: 83
LOS: 1 days | Discharge: HOME | End: 2018-09-17
Attending: EMERGENCY MEDICINE
Payer: MEDICARE

## 2018-09-17 VITALS
WEIGHT: 160.06 LBS | TEMPERATURE: 98 F | HEIGHT: 66 IN | RESPIRATION RATE: 18 BRPM | HEART RATE: 58 BPM | SYSTOLIC BLOOD PRESSURE: 166 MMHG | DIASTOLIC BLOOD PRESSURE: 80 MMHG | OXYGEN SATURATION: 95 %

## 2018-09-17 DIAGNOSIS — Z98.41 CATARACT EXTRACTION STATUS, RIGHT EYE: Chronic | ICD-10-CM

## 2018-09-17 DIAGNOSIS — Z98.42 CATARACT EXTRACTION STATUS, LEFT EYE: Chronic | ICD-10-CM

## 2018-09-17 LAB
ALBUMIN SERPL ELPH-MCNC: 3.7 G/DL — SIGNIFICANT CHANGE UP (ref 3.3–5.2)
ALP SERPL-CCNC: 55 U/L — SIGNIFICANT CHANGE UP (ref 40–120)
ALT FLD-CCNC: 9 U/L — SIGNIFICANT CHANGE UP
ANION GAP SERPL CALC-SCNC: 9 MMOL/L — SIGNIFICANT CHANGE UP (ref 5–17)
APTT BLD: 31.1 SEC — SIGNIFICANT CHANGE UP (ref 27.5–37.4)
AST SERPL-CCNC: 15 U/L — SIGNIFICANT CHANGE UP
BASOPHILS NFR BLD AUTO: 1 % — SIGNIFICANT CHANGE UP (ref 0–2)
BILIRUB SERPL-MCNC: 1.1 MG/DL — SIGNIFICANT CHANGE UP (ref 0.4–2)
BUN SERPL-MCNC: 16 MG/DL — SIGNIFICANT CHANGE UP (ref 8–20)
CALCIUM SERPL-MCNC: 8.8 MG/DL — SIGNIFICANT CHANGE UP (ref 8.6–10.2)
CHLORIDE SERPL-SCNC: 101 MMOL/L — SIGNIFICANT CHANGE UP (ref 98–107)
CO2 SERPL-SCNC: 31 MMOL/L — HIGH (ref 22–29)
CREAT SERPL-MCNC: 0.87 MG/DL — SIGNIFICANT CHANGE UP (ref 0.5–1.3)
EOSINOPHIL NFR BLD AUTO: 2 % — SIGNIFICANT CHANGE UP (ref 0–6)
GLUCOSE SERPL-MCNC: 113 MG/DL — SIGNIFICANT CHANGE UP (ref 70–115)
HCT VFR BLD CALC: 39 % — LOW (ref 42–52)
HGB BLD-MCNC: 12.7 G/DL — LOW (ref 14–18)
INR BLD: 1.01 RATIO — SIGNIFICANT CHANGE UP (ref 0.88–1.16)
LYMPHOCYTES # BLD AUTO: 9 % — LOW (ref 20–55)
MCHC RBC-ENTMCNC: 29.5 PG — SIGNIFICANT CHANGE UP (ref 27–31)
MCHC RBC-ENTMCNC: 32.6 G/DL — SIGNIFICANT CHANGE UP (ref 32–36)
MCV RBC AUTO: 90.7 FL — SIGNIFICANT CHANGE UP (ref 80–94)
MONOCYTES NFR BLD AUTO: 10 % — SIGNIFICANT CHANGE UP (ref 3–10)
NEUTROPHILS NFR BLD AUTO: 76 % — HIGH (ref 37–73)
PLAT MORPH BLD: NORMAL — SIGNIFICANT CHANGE UP
PLATELET # BLD AUTO: 184 K/UL — SIGNIFICANT CHANGE UP (ref 150–400)
POTASSIUM SERPL-MCNC: 4.2 MMOL/L — SIGNIFICANT CHANGE UP (ref 3.5–5.3)
POTASSIUM SERPL-SCNC: 4.2 MMOL/L — SIGNIFICANT CHANGE UP (ref 3.5–5.3)
PROT SERPL-MCNC: 6.1 G/DL — LOW (ref 6.6–8.7)
PROTHROM AB SERPL-ACNC: 11.1 SEC — SIGNIFICANT CHANGE UP (ref 9.8–12.7)
RBC # BLD: 4.3 M/UL — LOW (ref 4.6–6.2)
RBC # FLD: 13 % — SIGNIFICANT CHANGE UP (ref 11–15.6)
RBC BLD AUTO: NORMAL — SIGNIFICANT CHANGE UP
SODIUM SERPL-SCNC: 141 MMOL/L — SIGNIFICANT CHANGE UP (ref 135–145)
VARIANT LYMPHS # BLD: 2 % — SIGNIFICANT CHANGE UP (ref 0–6)
WBC # BLD: 5 K/UL — SIGNIFICANT CHANGE UP (ref 4.8–10.8)
WBC # FLD AUTO: 5 K/UL — SIGNIFICANT CHANGE UP (ref 4.8–10.8)

## 2018-09-17 PROCEDURE — 85610 PROTHROMBIN TIME: CPT

## 2018-09-17 PROCEDURE — 93010 ELECTROCARDIOGRAM REPORT: CPT

## 2018-09-17 PROCEDURE — 93005 ELECTROCARDIOGRAM TRACING: CPT

## 2018-09-17 PROCEDURE — 85027 COMPLETE CBC AUTOMATED: CPT

## 2018-09-17 PROCEDURE — 80053 COMPREHEN METABOLIC PANEL: CPT

## 2018-09-17 PROCEDURE — 70450 CT HEAD/BRAIN W/O DYE: CPT

## 2018-09-17 PROCEDURE — 12011 RPR F/E/E/N/L/M 2.5 CM/<: CPT

## 2018-09-17 PROCEDURE — 99284 EMERGENCY DEPT VISIT MOD MDM: CPT | Mod: 25

## 2018-09-17 PROCEDURE — 36415 COLL VENOUS BLD VENIPUNCTURE: CPT

## 2018-09-17 PROCEDURE — 85730 THROMBOPLASTIN TIME PARTIAL: CPT

## 2018-09-17 PROCEDURE — 70450 CT HEAD/BRAIN W/O DYE: CPT | Mod: 26

## 2018-09-17 NOTE — ED PROVIDER NOTE - OBJECTIVE STATEMENT
86 yo male with a pmh of dementia, HTN, HLD, and memory loss presents after a fall. Pt does not remember the fall and states to not have pain. Pt is a poor historian of his medically hx and of the fall. He states to walk alone without a cane/walker. He is not aware if he hit his head. Pt lives at nursing facility and per ems pt fell while giving someone a hug and hit his head but did not lose consciousness. 86 yo male with a pmh of dementia, HTN, HLD, and memory loss presents after a fall. Pt does not remember the fall and states to not have pain. Pt is a poor historian of his medically hx and of the fall due to his hx of dementia. He states to walk alone without a cane/walker. He is not aware if he hit his head. Pt lives at nursing facility and per ems pt fell while giving someone a hug and hit his head but did not lose consciousness.

## 2018-09-17 NOTE — ED PROVIDER NOTE - MEDICAL DECISION MAKING DETAILS
86 yo male presents after a fall. Will perform labs, ecg, and head CT to eval for brain hemorrhage. reevaluate.

## 2018-09-17 NOTE — ED ADULT NURSE NOTE - CHIEF COMPLAINT QUOTE
Patient BIBA, sent from the [facility] assisted living, as per EMS staff stated patient went to give another resident a hug and fell, hit head, negative LOC as per staff, negative blood thinner.  Patient awake, alert, confused at this time, hx of dementia

## 2018-09-17 NOTE — ED PROVIDER NOTE - ATTENDING CONTRIBUTION TO CARE
seen with acp: fall at nursing facility, likely mechanical; labs reassuring, ct normal without traumatic injury; lac repaired by acp; on exam, NAD, non toxic; right forehead with horizontal laceration appropx 3cm; no foreign body; no gross neuro deficits; abd soft NT; no bony ttp; +FROM x 4 ext; normal chest wall; agree with acp plan of care

## 2018-09-17 NOTE — ED ADULT TRIAGE NOTE - CHIEF COMPLAINT QUOTE
Patient BIBA, sent from the [facility] assisted living, as per EMS staff stated patient went to give another resident a hug and fell, hit head, negative LOC as per staff, negative blood thinner.  Patient awake, alert, confused at this time, hx of dementia

## 2018-09-17 NOTE — ED PROVIDER NOTE - CARE PLAN
Principal Discharge DX:	Fall, initial encounter Principal Discharge DX:	Closed head injury  Secondary Diagnosis:	Forehead laceration, initial encounter

## 2018-09-17 NOTE — ED PROVIDER NOTE - PROGRESS NOTE DETAILS
head CT read for no acute hemorrhage. Laceration repaired. pt cleared to return to nursing facility.

## 2018-09-20 ENCOUNTER — APPOINTMENT (OUTPATIENT)
Dept: HEMATOLOGY ONCOLOGY | Facility: CLINIC | Age: 83
End: 2018-09-20

## 2018-12-03 PROBLEM — Z85.038 HISTORY OF MALIGNANT NEOPLASM OF COLON: Status: RESOLVED | Noted: 2017-11-02 | Resolved: 2018-12-03

## 2018-12-03 PROBLEM — Z86.79 HISTORY OF HIGH BLOOD PRESSURE: Status: RESOLVED | Noted: 2017-11-02 | Resolved: 2018-12-03

## 2018-12-03 PROBLEM — Z86.73 HISTORY OF STROKE: Status: RESOLVED | Noted: 2017-11-02 | Resolved: 2018-12-03

## 2018-12-03 NOTE — LETTER CLOSING
[Sincerely yours,] : Sincerely yours, [FreeTextEntry3] : Pérez Lawrence MD\par Physician in Chief\par Department of Radiation Medicine\par [facility] [place]\par [facility] [place]\par \par  of Radiation Medicine\par [facility]\par at  [facility]\par \par Radiation \par [facility]/\par [facility] [place]\par [address]\par [place], [place] [address]\par \par Tel: [phone]\par Fax: [phone]\par

## 2018-12-03 NOTE — REVIEW OF SYSTEMS
[Negative] : Allergic/Immunologic [Anal Pain: Grade 0] : Anal Pain: Grade 0 [Diarrhea: Grade 1 - Increase of <4 stools per day over baseline; mild increase in ostomy output compared to baseline] : Diarrhea: Grade 1 - Increase of <4 stools per day over baseline; mild increase in ostomy output compared to baseline [Edema Limbs: Grade 0] : Edema Limbs: Grade 0  [Fatigue: Grade 0] : Fatigue: Grade 0 [Localized Edema: Grade 0] : Localized Edema: Grade 0  [Neck Edema: Grade 0] : Neck Edema: Grade 0 [Urinary Frequency: Grade 1 - Present] : Urinary Frequency: Grade 1 - Present [FreeTextEntry9] : occasional  incontinence . Wears depends.

## 2018-12-03 NOTE — LETTER GREETING
[Dear Doctor] : Dear Doctor, [Follow-Up] : Your patient, [unfilled] was seen in my office today for follow-up [Please see my note below.] : Please see my note below. [FreeTextEntry2] : Val Olguin MD

## 2018-12-03 NOTE — DISEASE MANAGEMENT
[Clinical] : TNM Stage: c [FreeTextEntry4] : adenocarcinoma rectum [TTNM] : 2 [NTNM] : 1 [MTNM] : 0 [IIIA] : IIIA

## 2018-12-03 NOTE — HISTORY OF PRESENT ILLNESS
[FreeTextEntry1] : Mr. 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 Reports urinary frequency & looser bowel movements. Denies hematochezia, pain, fatigue & weight loss. Follows with Dr. Olguin &  MRI pelvis 4/16/18 revealed Since 11/10/2017, the primary tumor and extramural disease shows partial response. Posttreatment stage: [id number] (tumor and/or fibrosis penetrates <= 5 mm beyond muscularis propria), [id number] Mesorectal fascia: Clear (tumor margin > 2 mm). Decreased size of mid-upper rectal neoplasm with minimal fibrotic spiculation extending beyond the muscularis propria at the right posterior and lateral position. Both tumor signal and fibrosis is present consistent with a partial response. No mesorectal or extra mesorectal lymphadenopathy. No evidence of metastatic disease in the pelvis.

## 2018-12-03 NOTE — PHYSICAL EXAM
[General Appearance - Well Developed] : well developed [General Appearance - Well Nourished] : well nourished [General Appearance - Alert] : alert [General Appearance - In No Acute Distress] : in no acute distress [Normal] : oriented to person, place and time, the affect was normal, the mood was normal and not anxious [Oriented To Time, Place, And Person] : oriented to person, place, and time [Affect] : the affect was normal [Mood] : the mood was normal [Not Anxious] : not anxious [de-identified] : deferred [FreeTextEntry1] : deferred [de-identified] : deferred

## 2018-12-03 NOTE — REASON FOR VISIT
[Routine Follow-Up] : routine follow-up visit for [Rectal Cancer] : cancer [Family Member] : family member

## 2018-12-04 ENCOUNTER — APPOINTMENT (OUTPATIENT)
Dept: RADIATION ONCOLOGY | Facility: CLINIC | Age: 83
End: 2018-12-04

## 2018-12-04 DIAGNOSIS — Z86.73 PERSONAL HISTORY OF TRANSIENT ISCHEMIC ATTACK (TIA), AND CEREBRAL INFARCTION W/OUT RESIDUAL DEFICITS: ICD-10-CM

## 2018-12-04 DIAGNOSIS — Z86.79 PERSONAL HISTORY OF OTHER DISEASES OF THE CIRCULATORY SYSTEM: ICD-10-CM

## 2018-12-04 DIAGNOSIS — Z85.038 PERSONAL HISTORY OF OTHER MALIGNANT NEOPLASM OF LARGE INTESTINE: ICD-10-CM

## 2019-02-02 NOTE — PROGRESS NOTE BEHAVIORAL HEALTH - NS ED BHA MSE GENERAL APPEARANCE
Chief complaint:   Chief Complaint   Patient presents with   • Lesion     cold since Wednesday,  lump in neck x long time ago, soreness the other day.  eyelid lump x month        Vitals:  Visit Vitals  /76 (BP Location: RUE, Patient Position: Sitting, Cuff Size: Large Adult)   Pulse 80   Temp 98 °F (36.7 °C) (Temporal)   Resp 16   Ht 5' 6\" (1.676 m)   Wt 80.6 kg   BMI 28.66 kg/m²       HISTORY OF PRESENT ILLNESS     Patient seen in the office for evaluation of several concerns. Patient reports that she has had swelling in the left upper eyelid for proximally one month. She has tried warm compresses. Has not tried any other modalities. It is not worse but it is not better. She reports that she has had cold-like symptoms since Wednesday. Which has included a mild sore throat, runny nose, congestion. No coughing or shortness of breath. She also reports that she has had \"lumps in her neck for a long time\".         Other significant problems:  Patient Active Problem List    Diagnosis Date Noted   • Strain of left biceps muscle 04/07/2017     Priority: Low     · DOI 3/31/17, after participating in home program of mixed martial arts  · Cortisone injection to left biceps 4/6/17  · Pain resolved 4/18 -- home exercises for stiffness. Full strength. Good prognosis.          PAST MEDICAL, FAMILY AND SOCIAL HISTORY     Medications:  Current Outpatient Medications   Medication   • ibuprofen (MOTRIN) 200 MG tablet   • Multiple Vitamins-Minerals (MULTIVITAMIN PO)   • erythromycin (ILOTYCIN) ophthalmic ointment     Current Facility-Administered Medications   Medication   • betamethasone acet/sod phos (CELESTONE) injection 6 mg       Allergies:  ALLERGIES:   Allergen Reactions   • Naproxen Sodium [Aleve] Other (See Comments)     Flushing       Past Medical  History/Surgeries:  History reviewed. No pertinent past medical history.    History reviewed. No pertinent surgical history.    Family History:  Family History   Problem  Relation Age of Onset   • Multiple Sclerosis Mother    • Eczema Son    • High blood pressure Maternal Grandfather    • Cancer Maternal Grandfather         melanoma   • Eczema Son        Social History:  Social History     Tobacco Use   • Smoking status: Former Smoker     Packs/day: 1.00     Types: Cigarettes     Start date: 1990     Last attempt to quit: 1997     Years since quittin.0   • Smokeless tobacco: Never Used   Substance Use Topics   • Alcohol use: No       REVIEW OF SYSTEMS     Review of Systems   Constitutional: Negative for chills and fever.   HENT: Positive for congestion, rhinorrhea and sore throat. Negative for ear discharge and ear pain.    Eyes: Negative for photophobia, discharge, redness and itching.        Left eyelid swelling and discomfort.    Respiratory: Negative for cough.    Hematological: Positive for adenopathy.       PHYSICAL EXAM     Physical Exam   Constitutional: She appears well-developed and well-nourished.   HENT:   Head: Normocephalic and atraumatic.   Right Ear: External ear normal.   Left Ear: External ear normal.   Mouth/Throat: Oropharynx is clear and moist. No oropharyngeal exudate.   Eyes: Conjunctivae are normal. Right eye exhibits no discharge. Left eye exhibits no discharge.   Left lid swelling noted with stye.   Neck: Neck supple.   Cardiovascular: Normal rate, regular rhythm and normal heart sounds.   Pulmonary/Chest: Effort normal and breath sounds normal. No respiratory distress. She has no wheezes. She has no rales.   Lymphadenopathy:     She has cervical adenopathy.   Neurological: She is alert.   Skin: Skin is warm. She is not diaphoretic.   Psychiatric: She has a normal mood and affect. Her behavior is normal.   Vitals reviewed.      ASSESSMENT/PLAN     1. Hordeolum externum of left upper eyelid    2. Viral URI      Orders Placed This Encounter   • erythromycin (ILOTYCIN) ophthalmic ointment     1. Recommend treatment with erythromycin ophthalmic  ointment for left upper eyelid stye. Handout given to patient. See patient instructions.  2. Viral upper respiratory infection. Recommend rest, fluids, monitor symptoms.  3. Cervical adenopathy. Will monitor. Suspect related to recent viral respiratory illness. If not improving will consider additional testing.   Well developed

## 2019-07-10 ENCOUNTER — INPATIENT (INPATIENT)
Facility: HOSPITAL | Age: 84
LOS: 4 days | Discharge: ANOTHER HOSPITAL | DRG: 193 | End: 2019-07-15
Attending: INTERNAL MEDICINE | Admitting: INTERNAL MEDICINE
Payer: MEDICARE

## 2019-07-10 VITALS
OXYGEN SATURATION: 97 % | HEART RATE: 73 BPM | WEIGHT: 158.07 LBS | RESPIRATION RATE: 15 BRPM | DIASTOLIC BLOOD PRESSURE: 70 MMHG | TEMPERATURE: 99 F | SYSTOLIC BLOOD PRESSURE: 120 MMHG | HEIGHT: 69 IN

## 2019-07-10 DIAGNOSIS — Z98.42 CATARACT EXTRACTION STATUS, LEFT EYE: Chronic | ICD-10-CM

## 2019-07-10 DIAGNOSIS — Z98.41 CATARACT EXTRACTION STATUS, RIGHT EYE: Chronic | ICD-10-CM

## 2019-07-10 LAB
ALBUMIN SERPL ELPH-MCNC: 3.1 G/DL — LOW (ref 3.3–5.2)
ALP SERPL-CCNC: 115 U/L — SIGNIFICANT CHANGE UP (ref 40–120)
ALT FLD-CCNC: 18 U/L — SIGNIFICANT CHANGE UP
ANION GAP SERPL CALC-SCNC: 11 MMOL/L — SIGNIFICANT CHANGE UP (ref 5–17)
AST SERPL-CCNC: 24 U/L — SIGNIFICANT CHANGE UP
BASOPHILS # BLD AUTO: 0.08 K/UL — SIGNIFICANT CHANGE UP (ref 0–0.2)
BASOPHILS NFR BLD AUTO: 0.9 % — SIGNIFICANT CHANGE UP (ref 0–2)
BILIRUB SERPL-MCNC: 1.2 MG/DL — SIGNIFICANT CHANGE UP (ref 0.4–2)
BUN SERPL-MCNC: 15 MG/DL — SIGNIFICANT CHANGE UP (ref 8–20)
CALCIUM SERPL-MCNC: 8.8 MG/DL — SIGNIFICANT CHANGE UP (ref 8.6–10.2)
CHLORIDE SERPL-SCNC: 98 MMOL/L — SIGNIFICANT CHANGE UP (ref 98–107)
CO2 SERPL-SCNC: 27 MMOL/L — SIGNIFICANT CHANGE UP (ref 22–29)
CREAT SERPL-MCNC: 0.78 MG/DL — SIGNIFICANT CHANGE UP (ref 0.5–1.3)
EOSINOPHIL # BLD AUTO: 0.08 K/UL — SIGNIFICANT CHANGE UP (ref 0–0.5)
EOSINOPHIL NFR BLD AUTO: 0.9 % — SIGNIFICANT CHANGE UP (ref 0–6)
GIANT PLATELETS BLD QL SMEAR: PRESENT — SIGNIFICANT CHANGE UP
GLUCOSE SERPL-MCNC: 138 MG/DL — HIGH (ref 70–115)
HCT VFR BLD CALC: 36.9 % — LOW (ref 39–50)
HGB BLD-MCNC: 12.2 G/DL — LOW (ref 13–17)
LYMPHOCYTES # BLD AUTO: 0.38 K/UL — LOW (ref 1–3.3)
LYMPHOCYTES # BLD AUTO: 4.4 % — LOW (ref 13–44)
MANUAL SMEAR VERIFICATION: SIGNIFICANT CHANGE UP
MCHC RBC-ENTMCNC: 30.2 PG — SIGNIFICANT CHANGE UP (ref 27–34)
MCHC RBC-ENTMCNC: 33.1 GM/DL — SIGNIFICANT CHANGE UP (ref 32–36)
MCV RBC AUTO: 91.3 FL — SIGNIFICANT CHANGE UP (ref 80–100)
MONOCYTES # BLD AUTO: 0.82 K/UL — SIGNIFICANT CHANGE UP (ref 0–0.9)
MONOCYTES NFR BLD AUTO: 9.6 % — SIGNIFICANT CHANGE UP (ref 2–14)
NEUTROPHILS # BLD AUTO: 7.2 K/UL — SIGNIFICANT CHANGE UP (ref 1.8–7.4)
NEUTROPHILS NFR BLD AUTO: 84.2 % — HIGH (ref 43–77)
PLAT MORPH BLD: NORMAL — SIGNIFICANT CHANGE UP
PLATELET # BLD AUTO: 242 K/UL — SIGNIFICANT CHANGE UP (ref 150–400)
POTASSIUM SERPL-MCNC: 4.2 MMOL/L — SIGNIFICANT CHANGE UP (ref 3.5–5.3)
POTASSIUM SERPL-SCNC: 4.2 MMOL/L — SIGNIFICANT CHANGE UP (ref 3.5–5.3)
PROT SERPL-MCNC: 6.4 G/DL — LOW (ref 6.6–8.7)
RBC # BLD: 4.04 M/UL — LOW (ref 4.2–5.8)
RBC # FLD: 12.8 % — SIGNIFICANT CHANGE UP (ref 10.3–14.5)
RBC BLD AUTO: NORMAL — SIGNIFICANT CHANGE UP
SODIUM SERPL-SCNC: 136 MMOL/L — SIGNIFICANT CHANGE UP (ref 135–145)
WBC # BLD: 8.55 K/UL — SIGNIFICANT CHANGE UP (ref 3.8–10.5)
WBC # FLD AUTO: 8.55 K/UL — SIGNIFICANT CHANGE UP (ref 3.8–10.5)

## 2019-07-10 PROCEDURE — 99285 EMERGENCY DEPT VISIT HI MDM: CPT

## 2019-07-10 PROCEDURE — 70450 CT HEAD/BRAIN W/O DYE: CPT | Mod: 26

## 2019-07-10 RX ORDER — SODIUM CHLORIDE 9 MG/ML
500 INJECTION INTRAMUSCULAR; INTRAVENOUS; SUBCUTANEOUS ONCE
Refills: 0 | Status: COMPLETED | OUTPATIENT
Start: 2019-07-10 | End: 2019-07-10

## 2019-07-10 RX ORDER — SODIUM CHLORIDE 9 MG/ML
3 INJECTION INTRAMUSCULAR; INTRAVENOUS; SUBCUTANEOUS ONCE
Refills: 0 | Status: COMPLETED | OUTPATIENT
Start: 2019-07-10 | End: 2019-07-10

## 2019-07-10 RX ORDER — ACETAMINOPHEN 500 MG
650 TABLET ORAL ONCE
Refills: 0 | Status: COMPLETED | OUTPATIENT
Start: 2019-07-10 | End: 2019-07-10

## 2019-07-10 RX ADMIN — SODIUM CHLORIDE 3 MILLILITER(S): 9 INJECTION INTRAMUSCULAR; INTRAVENOUS; SUBCUTANEOUS at 22:37

## 2019-07-10 RX ADMIN — Medication 650 MILLIGRAM(S): at 23:57

## 2019-07-10 RX ADMIN — SODIUM CHLORIDE 500 MILLILITER(S): 9 INJECTION INTRAMUSCULAR; INTRAVENOUS; SUBCUTANEOUS at 23:57

## 2019-07-10 RX ADMIN — SODIUM CHLORIDE 500 MILLILITER(S): 9 INJECTION INTRAMUSCULAR; INTRAVENOUS; SUBCUTANEOUS at 22:30

## 2019-07-10 NOTE — ED PROVIDER NOTE - PHYSICAL EXAMINATION
General: In NAD, non-toxic appearing; well nourished/developed.  Skin: No rashes or lesions. Warm, dry, color normal for race. No cyanosis or jaundice appreciated. Good turgor.  Head: Normocephalic/atraumatic.   Eyes: Sclera anicteric, conjunctivae clear b/l. No discharge. PERRLA.  Mouth/Throat: Dry mucous membranes.   Cardio: No lifts, heaves, visible pulsations. No thrills. Rate and rhythm regular, S1 & S2 clear. No audible murmur, gallop, or rub.   PV: No edema of feet/ankles. No calf swelling/tenderness. Pulses: b/l 2+ radial, DP, PT. Capillary refill <2 seconds.  Resp: Normal AP to lateral diameter, symmetrical excursion b/l. Breath sounds vesicular, symmetrical and without rales, rhonchi or wheezing b/l.  Abd: Non-distended. Soft, non-tender, no masses palpated. No rebound, guarding.   MSK/Neuro: A&Ox3, cooperative, pleasant. Affect appropriate, thought, speech, and language coherent. No slurred speech. Normal muscle bulk/tone, no deformity. Good posture. FROM. Strength 5/5 upper and lower extremities. Sensation intact to bilateral upper and lower extremities. Normal gait including tandem, heel and toe walking. Negative Romberg and pronator drift. Normal point-to-point test. General: In NAD, non-toxic appearing; well nourished/developed.  Skin: No rashes or lesions. Warm, dry, color normal for race. No cyanosis or jaundice appreciated.   Head: Normocephalic/atraumatic.   Eyes: Sclera anicteric, conjunctivae clear b/l. No discharge. PERRLA.  Mouth/Throat: Dry mucous membranes.   Cardio: No lifts, heaves, visible pulsations. No thrills. Rate and rhythm regular, S1 & S2 clear. No audible murmur, gallop, or rub.   PV: No edema of feet/ankles. Pulses: b/l 2+ radial, DP, PT. Capillary refill <2 seconds.  Resp: Normal AP to lateral diameter, symmetrical excursion b/l. Breath sounds vesicular, symmetrical and without rales, rhonchi or wheezing b/l.  Abd: Non-distended. Soft, non-tender, no masses palpated. No rebound, guarding.   MSK/Neuro: Responds to verbal stimuli. Able to squeeze providers hands to answer yes or no questions.

## 2019-07-10 NOTE — ED ADULT NURSE NOTE - CHIEF COMPLAINT QUOTE
pt arrive by ambulance from [facility] with reports of change in mental status. pt with hx dementia, as per EMS nurse at facility and family noted pt was not himself, less responsive than normal. normally will converse. pt arrives sleepy. slow to follow commands. last known well this morning.

## 2019-07-10 NOTE — ED ADULT TRIAGE NOTE - CHIEF COMPLAINT QUOTE
pt arrive by ambulance from [facility] with reports of change in mental status. pt with hx dementia, as per EMS nurse at facility and family noted pt was not himself, less responsive than normal. normally will converse. pt arrives sleepy. slow to follow commands. pt arrive by ambulance from [facility] with reports of change in mental status. pt with hx dementia, as per EMS nurse at facility and family noted pt was not himself, less responsive than normal. normally will converse. pt arrives sleepy. slow to follow commands. last known well this morning.

## 2019-07-10 NOTE — ED ADULT NURSE NOTE - OBJECTIVE STATEMENT
pt brought to ER for family concerns of pt having generalized weakness decrease appetite, increase in sleeping, bowel and urinary incontinence, and not speaking. as per family pt had a stroke 2 years ago which affected his speech but normally will respond with 1-2word answers.  hx of colon ca and received chemo/radiation no sx as per family.

## 2019-07-10 NOTE — ED PROVIDER NOTE - OBJECTIVE STATEMENT
87 yo male PMHx Dementia, CVA, colon CA, prostate CA, HTN, HDL, BIBA from. Over the last 2 weeks, more weak, tired decreased PO intake. Bowel and urinary incontinence. Today constant sleeping, 89 yo male PMHx Dementia, CVA, colon CA, prostate CA, HTN, HDL, BIBA from. Over the last 2 weeks, more weak, tired decreased PO intake. Bowel and urinary incontinence. Today constant sleeping. Usually walks with walker, unable to ambulate since 4 days. Not on chemotherapy. 89 yo male PMHx Dementia, CVA, colon CA, prostate CA, HTN, HDL, BIBA from [facility] c/o altered mental status x1 day. Son reports over the last two weeks decreased PO intake, increased lethargy, bowel/bladder incontinence. Patient previously able to ambulate with assistance of walker, however over the last 4 days has been bed bound. Today, patient has sleep all day, prompting presentation to ED. No further complaints at this time.

## 2019-07-10 NOTE — ED PROVIDER NOTE - CLINICAL SUMMARY MEDICAL DECISION MAKING FREE TEXT BOX
89 yo male PMHx Dementia, CVA, colon CA, prostate CA, HTN, HDL, BIBA from [facility] c/o altered mental status x1 day. At baseline, patient non-verbal. Febrile.  Plan:  - Labs  - CXR  - CT Head  - Antipyretics  - Admit for AMS

## 2019-07-10 NOTE — ED ADULT NURSE REASSESSMENT NOTE - NS ED NURSE REASSESS COMMENT FT1
RN spoke with MD Conley regarding concerns for patients acute mental status MD Yael ma to place orders while pt is waiting to be evaluated by MD

## 2019-07-10 NOTE — ED PROVIDER NOTE - ATTENDING CONTRIBUTION TO CARE
87 yo M presents to ED by son for eval of worsening confusion and lethargy x last 2 weeks with decreased speech and po intake  Pt with hx CVA, Dementia, HTN, colon Ca. Rectal temp 100.7  Awake, non-verbal no acute distress, mm sl dry, Neck supple, Cor Reg, Lungs clear b/l, abd soft, NT, Ext no edema, no focal deficits.  will check labs, UA, CXR, blood Cx and pt will require admission 89 yo M presents to ED by son for eval of worsening confusion and lethargy x last 2 weeks with decreased speech and po intake  Pt with hx CVA, Dementia, HTN, colon Ca. Rectal temp 100.7  Awake, non-verbal no acute distress, mm sl dry, Neck supple, Cor Reg, Lungs clear b/l, abd soft, NT, Ext no edema, no focal deficits.  will check labs, UA, CXR, blood Cx, start empiric Abx and pt will require admission

## 2019-07-10 NOTE — ED ADULT NURSE NOTE - NSIMPLEMENTINTERV_GEN_ALL_ED
Implemented All Fall with Harm Risk Interventions:  [place] to call system. Call bell, personal items and telephone within reach. Instruct patient to call for assistance. Room bathroom lighting operational. Non-slip footwear when patient is off stretcher. Physically safe environment: no spills, clutter or unnecessary equipment. Stretcher in lowest position, wheels locked, appropriate side rails in place. Provide visual cue, wrist band, yellow gown, etc. Monitor gait and stability. Monitor for mental status changes and reorient to person, place, and time. Review medications for side effects contributing to fall risk. Reinforce activity limits and safety measures with patient and family. Provide visual clues: red socks.

## 2019-07-11 DIAGNOSIS — I65.1 OCCLUSION AND STENOSIS OF BASILAR ARTERY: ICD-10-CM

## 2019-07-11 DIAGNOSIS — J18.9 PNEUMONIA, UNSPECIFIED ORGANISM: ICD-10-CM

## 2019-07-11 DIAGNOSIS — I10 ESSENTIAL (PRIMARY) HYPERTENSION: ICD-10-CM

## 2019-07-11 DIAGNOSIS — G30.1 ALZHEIMER'S DISEASE WITH LATE ONSET: ICD-10-CM

## 2019-07-11 DIAGNOSIS — E78.00 PURE HYPERCHOLESTEROLEMIA, UNSPECIFIED: ICD-10-CM

## 2019-07-11 DIAGNOSIS — R41.82 ALTERED MENTAL STATUS, UNSPECIFIED: ICD-10-CM

## 2019-07-11 LAB
APPEARANCE UR: CLEAR — SIGNIFICANT CHANGE UP
BACTERIA # UR AUTO: ABNORMAL
BILIRUB UR-MCNC: ABNORMAL
COLOR SPEC: ABNORMAL
COMMENT - URINE: SIGNIFICANT CHANGE UP
DIFF PNL FLD: ABNORMAL
EPI CELLS # UR: SIGNIFICANT CHANGE UP
GLUCOSE UR QL: NEGATIVE MG/DL — SIGNIFICANT CHANGE UP
KETONES UR-MCNC: ABNORMAL
LACTATE BLDV-MCNC: 0.7 MMOL/L — SIGNIFICANT CHANGE UP (ref 0.5–2)
LEUKOCYTE ESTERASE UR-ACNC: ABNORMAL
NITRITE UR-MCNC: NEGATIVE — SIGNIFICANT CHANGE UP
PH UR: 5 — SIGNIFICANT CHANGE UP (ref 5–8)
PROT UR-MCNC: 30 MG/DL
RBC CASTS # UR COMP ASSIST: SIGNIFICANT CHANGE UP /HPF (ref 0–4)
SP GR SPEC: 1.02 — SIGNIFICANT CHANGE UP (ref 1.01–1.02)
UROBILINOGEN FLD QL: 8 MG/DL
WBC UR QL: SIGNIFICANT CHANGE UP

## 2019-07-11 PROCEDURE — 99222 1ST HOSP IP/OBS MODERATE 55: CPT

## 2019-07-11 PROCEDURE — 71045 X-RAY EXAM CHEST 1 VIEW: CPT | Mod: 26

## 2019-07-11 PROCEDURE — 12345: CPT | Mod: NC

## 2019-07-11 PROCEDURE — 99223 1ST HOSP IP/OBS HIGH 75: CPT

## 2019-07-11 RX ORDER — ENOXAPARIN SODIUM 100 MG/ML
40 INJECTION SUBCUTANEOUS DAILY
Refills: 0 | Status: DISCONTINUED | OUTPATIENT
Start: 2019-07-11 | End: 2019-07-15

## 2019-07-11 RX ORDER — AZITHROMYCIN 500 MG/1
500 TABLET, FILM COATED ORAL ONCE
Refills: 0 | Status: COMPLETED | OUTPATIENT
Start: 2019-07-11 | End: 2019-07-11

## 2019-07-11 RX ORDER — QUETIAPINE FUMARATE 200 MG/1
25 TABLET, FILM COATED ORAL AT BEDTIME
Refills: 0 | Status: DISCONTINUED | OUTPATIENT
Start: 2019-07-11 | End: 2019-07-15

## 2019-07-11 RX ORDER — AZITHROMYCIN 500 MG/1
500 TABLET, FILM COATED ORAL EVERY 24 HOURS
Refills: 0 | Status: DISCONTINUED | OUTPATIENT
Start: 2019-07-12 | End: 2019-07-15

## 2019-07-11 RX ORDER — ASPIRIN/CALCIUM CARB/MAGNESIUM 324 MG
81 TABLET ORAL DAILY
Refills: 0 | Status: DISCONTINUED | OUTPATIENT
Start: 2019-07-11 | End: 2019-07-15

## 2019-07-11 RX ORDER — ESCITALOPRAM OXALATE 10 MG/1
20 TABLET, FILM COATED ORAL DAILY
Refills: 0 | Status: DISCONTINUED | OUTPATIENT
Start: 2019-07-11 | End: 2019-07-15

## 2019-07-11 RX ORDER — ATORVASTATIN CALCIUM 80 MG/1
20 TABLET, FILM COATED ORAL AT BEDTIME
Refills: 0 | Status: DISCONTINUED | OUTPATIENT
Start: 2019-07-11 | End: 2019-07-15

## 2019-07-11 RX ORDER — SODIUM CHLORIDE 9 MG/ML
1000 INJECTION, SOLUTION INTRAVENOUS
Refills: 0 | Status: DISCONTINUED | OUTPATIENT
Start: 2019-07-11 | End: 2019-07-13

## 2019-07-11 RX ORDER — CEFTRIAXONE 500 MG/1
1000 INJECTION, POWDER, FOR SOLUTION INTRAMUSCULAR; INTRAVENOUS ONCE
Refills: 0 | Status: COMPLETED | OUTPATIENT
Start: 2019-07-11 | End: 2019-07-11

## 2019-07-11 RX ORDER — PANTOPRAZOLE SODIUM 20 MG/1
40 TABLET, DELAYED RELEASE ORAL
Refills: 0 | Status: DISCONTINUED | OUTPATIENT
Start: 2019-07-11 | End: 2019-07-15

## 2019-07-11 RX ORDER — MEMANTINE HYDROCHLORIDE 10 MG/1
5 TABLET ORAL AT BEDTIME
Refills: 0 | Status: DISCONTINUED | OUTPATIENT
Start: 2019-07-11 | End: 2019-07-11

## 2019-07-11 RX ORDER — CEFTRIAXONE 500 MG/1
1000 INJECTION, POWDER, FOR SOLUTION INTRAMUSCULAR; INTRAVENOUS EVERY 24 HOURS
Refills: 0 | Status: DISCONTINUED | OUTPATIENT
Start: 2019-07-11 | End: 2019-07-15

## 2019-07-11 RX ORDER — IPRATROPIUM/ALBUTEROL SULFATE 18-103MCG
3 AEROSOL WITH ADAPTER (GRAM) INHALATION EVERY 6 HOURS
Refills: 0 | Status: DISCONTINUED | OUTPATIENT
Start: 2019-07-11 | End: 2019-07-15

## 2019-07-11 RX ORDER — SACCHAROMYCES BOULARDII 250 MG
250 POWDER IN PACKET (EA) ORAL
Refills: 0 | Status: DISCONTINUED | OUTPATIENT
Start: 2019-07-11 | End: 2019-07-15

## 2019-07-11 RX ORDER — MEMANTINE HYDROCHLORIDE 10 MG/1
10 TABLET ORAL
Refills: 0 | Status: DISCONTINUED | OUTPATIENT
Start: 2019-07-11 | End: 2019-07-15

## 2019-07-11 RX ORDER — AZITHROMYCIN 500 MG/1
TABLET, FILM COATED ORAL
Refills: 0 | Status: DISCONTINUED | OUTPATIENT
Start: 2019-07-11 | End: 2019-07-15

## 2019-07-11 RX ORDER — SODIUM CHLORIDE 9 MG/ML
3 INJECTION INTRAMUSCULAR; INTRAVENOUS; SUBCUTANEOUS EVERY 8 HOURS
Refills: 0 | Status: DISCONTINUED | OUTPATIENT
Start: 2019-07-11 | End: 2019-07-15

## 2019-07-11 RX ORDER — SODIUM CHLORIDE 9 MG/ML
1700 INJECTION INTRAMUSCULAR; INTRAVENOUS; SUBCUTANEOUS ONCE
Refills: 0 | Status: COMPLETED | OUTPATIENT
Start: 2019-07-11 | End: 2019-07-11

## 2019-07-11 RX ORDER — DONEPEZIL HYDROCHLORIDE 10 MG/1
10 TABLET, FILM COATED ORAL AT BEDTIME
Refills: 0 | Status: DISCONTINUED | OUTPATIENT
Start: 2019-07-11 | End: 2019-07-15

## 2019-07-11 RX ORDER — ONDANSETRON 8 MG/1
4 TABLET, FILM COATED ORAL EVERY 6 HOURS
Refills: 0 | Status: DISCONTINUED | OUTPATIENT
Start: 2019-07-11 | End: 2019-07-15

## 2019-07-11 RX ORDER — ESCITALOPRAM OXALATE 10 MG/1
10 TABLET, FILM COATED ORAL DAILY
Refills: 0 | Status: DISCONTINUED | OUTPATIENT
Start: 2019-07-11 | End: 2019-07-11

## 2019-07-11 RX ADMIN — DONEPEZIL HYDROCHLORIDE 10 MILLIGRAM(S): 10 TABLET, FILM COATED ORAL at 23:15

## 2019-07-11 RX ADMIN — ATORVASTATIN CALCIUM 20 MILLIGRAM(S): 80 TABLET, FILM COATED ORAL at 23:15

## 2019-07-11 RX ADMIN — SODIUM CHLORIDE 3 MILLILITER(S): 9 INJECTION INTRAMUSCULAR; INTRAVENOUS; SUBCUTANEOUS at 23:18

## 2019-07-11 RX ADMIN — QUETIAPINE FUMARATE 25 MILLIGRAM(S): 200 TABLET, FILM COATED ORAL at 23:15

## 2019-07-11 RX ADMIN — ENOXAPARIN SODIUM 40 MILLIGRAM(S): 100 INJECTION SUBCUTANEOUS at 11:38

## 2019-07-11 RX ADMIN — SODIUM CHLORIDE 3 MILLILITER(S): 9 INJECTION INTRAMUSCULAR; INTRAVENOUS; SUBCUTANEOUS at 06:35

## 2019-07-11 RX ADMIN — CEFTRIAXONE 100 MILLIGRAM(S): 500 INJECTION, POWDER, FOR SOLUTION INTRAMUSCULAR; INTRAVENOUS at 02:45

## 2019-07-11 RX ADMIN — CEFTRIAXONE 1000 MILLIGRAM(S): 500 INJECTION, POWDER, FOR SOLUTION INTRAMUSCULAR; INTRAVENOUS at 03:15

## 2019-07-11 RX ADMIN — SODIUM CHLORIDE 3 MILLILITER(S): 9 INJECTION INTRAMUSCULAR; INTRAVENOUS; SUBCUTANEOUS at 18:59

## 2019-07-11 RX ADMIN — AZITHROMYCIN 255 MILLIGRAM(S): 500 TABLET, FILM COATED ORAL at 05:31

## 2019-07-11 RX ADMIN — CEFTRIAXONE 100 MILLIGRAM(S): 500 INJECTION, POWDER, FOR SOLUTION INTRAMUSCULAR; INTRAVENOUS at 23:15

## 2019-07-11 RX ADMIN — SODIUM CHLORIDE 125 MILLILITER(S): 9 INJECTION, SOLUTION INTRAVENOUS at 18:59

## 2019-07-11 RX ADMIN — Medication 650 MILLIGRAM(S): at 00:55

## 2019-07-11 RX ADMIN — SODIUM CHLORIDE 125 MILLILITER(S): 9 INJECTION, SOLUTION INTRAVENOUS at 08:54

## 2019-07-11 NOTE — CONSULT NOTE ADULT - ATTENDING COMMENTS
QUINCY Attg:    see above    patient seen and examined  minimally verbal  dysarthric  opens eyes to voice  follows commands bilaterally    agree with plan as above

## 2019-07-11 NOTE — CONSULT NOTE ADULT - ASSESSMENT
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       PLAN:  CTA BRAIN IF FAMILY WANTS WORK UP

## 2019-07-11 NOTE — PROGRESS NOTE ADULT - SUBJECTIVE AND OBJECTIVE BOX
Patient was seen and examined at bedside. Sleeping. Woke him up but did not want to talk. As per nurse, no active issues.    PHYSICAL EXAM:  Vital Signs   T(C): 36.8 (11 Jul 2019 06:50), Max: 38.2 (10 Jul 2019 23:30)  T(F): 98.2 (11 Jul 2019 06:50), Max: 100.7 (10 Jul 2019 23:30)  HR: 61 (11 Jul 2019 16:11) (61 - 78)  BP: 147/69 (11 Jul 2019 16:11) (120/70 - 147/69)  BP(mean): 94 (11 Jul 2019 05:07) (94 - 94)  RR: 16 (11 Jul 2019 06:50) (15 - 18)  SpO2: 98% (11 Jul 2019 16:11) (97% - 98%)  General: Elderly male lying in bed comfortably. No acute distress  HEENT: PERRLA. EOMI. Clear conjunctivae. Moist mucus membrane  Neck: Supple.   Chest: CTA bilaterally - no wheezing, rales or rhonchi.   Heart: Normal S1 & S2 with RRR.   Abdomen: Soft. Non-tender. Non-distended. + BS  Ext: No pedal edema. No calf tenderness   Neuro: Sleeping.   Skin: Warm and Dry  Psychiatry: Sleeping    Labs and radiology reviewed.    Plan:  Continue current treatment.   Will follow up with family regarding GOC.    Please refer to H&P from earlier today for more details.

## 2019-07-11 NOTE — H&P ADULT - NSICDXPASTSURGICALHX_GEN_ALL_CORE_FT
PAST SURGICAL HISTORY:  S/P cataract extraction and insertion of intraocular lens, left     S/P cataract extraction and insertion of intraocular lens, right

## 2019-07-11 NOTE — H&P ADULT - NSICDXPASTMEDICALHX_GEN_ALL_CORE_FT
PAST MEDICAL HISTORY:  Colon cancer     CVA (cerebral vascular accident)     Dementia     Depression     GERD (gastroesophageal reflux disease)     High cholesterol     HTN (hypertension)     Memory loss or impairment     Prostate CA

## 2019-07-11 NOTE — CONSULT NOTE ADULT - SUBJECTIVE AND OBJECTIVE BOX
cc: Patient is a 88y old  Male who presents with a chief complaint of declining mental status.   SOURCE: Patients chart  HPI: 87 yo male PMHx Dementia, CVA, colon CA, prostate CA, HTN, HDL, BIBA from [facility] c/o altered mental status x1 day. Son reports over the last two weeks decreased PO intake, increased lethargy, bowel/bladder incontinence. Patient previously able to ambulate with assistance of walker, however over the last 4 days has been bed bound. Today, patient has sleep all day, prompting presentation to ED. No further complaints at this time.  Patient not cooperative for hx or exam. Combative, verbally refusing exam.     PAST MEDICAL:  Depression  GERD (gastroesophageal reflux disease)  High cholesterol  Dementia  CVA (cerebral vascular accident)  Colon cancer  Prostate CA  Memory loss or impairment  HTN (hypertension)    SURGICAL HISTORY:   cataract extraction and insertion of intraocular lens, left  cataract extraction and insertion of intraocular lens, right      SOCIAL HISTORY: UNOBTAINABLE AT THIS TIME     FAMILY HISTORY:  No pertinent family history in first degree relatives      ROS:  UNOBTAINABLE AT THIS TIME       MEDICATIONS  (STANDING):  aspirin enteric coated 81 milliGRAM(s) Oral daily  atorvastatin 20 milliGRAM(s) Oral at bedtime  azithromycin  IVPB      cefTRIAXone   IVPB 1000 milliGRAM(s) IV Intermittent every 24 hours  donepezil 10 milliGRAM(s) Oral at bedtime  enoxaparin Injectable 40 milliGRAM(s) SubCutaneous daily  escitalopram 10 milliGRAM(s) Oral daily  memantine 5 milliGRAM(s) Oral at bedtime  multivitamin 1 Tablet(s) Oral daily  pantoprazole    Tablet 40 milliGRAM(s) Oral before breakfast  saccharomyces boulardii 250 milliGRAM(s) Oral two times a day  sodium chloride 0.9% lock flush 3 milliLiter(s) IV Push every 8 hours    MEDICATIONS  (PRN):  ALBUTerol/ipratropium for Nebulization 3 milliLiter(s) Nebulizer every 6 hours PRN Shortness of Breath and/or Wheezing  ondansetron Injectable 4 milliGRAM(s) IV Push every 6 hours PRN Nausea      Allergies: No Known Allergies      Vital Signs Last 24 Hrs  T(C): 36.6 (2019 02:29), Max: 38.2 (10 Jul 2019 23:30)  T(F): 97.9 (2019 02:29), Max: 100.7 (10 Jul 2019 23:30)  HR: 64 (2019 02:29) (64 - 73)  BP: 124/86 (2019 02:29) (120/70 - 125/61)  BP(mean): --  RR: 18 (2019 02:29) (15 - 18)  SpO2: 97% (2019 02:29) (97% - 97%)    PHYSICAL EXAM:  GENERAL: NAD, well-groomed, cachectic   HEAD:  Atraumatic, Normocephalic  EYES:  PERRLA, conjunctiva and sclera clear  ENMT: unable to examine   NECK: Supple, No JVD  NERVOUS SYSTEM:  Not cooperative for exam. Moves all extrem strength equal bilaterally  upper and lower, strong kicking and punching.   CHEST/LUNG: Clear bilaterally; No rales, rhonchi, wheezing, or rubs  HEART: Regular rate   ABDOMEN: Soft, Nondistended; Bowel sounds present  EXTREMITIES:  2+ Peripheral Pulses, No clubbing, cyanosis, or edema  LYMPH: No lymphadenopathy noted  SKIN: No rashes or lesions      RADIOLOGY & ADDITIONAL STUDIES:  CT HEAD: 10.55AM  Chronic small vessel ischemic changes are noted throughout the   basal ganglia thalami and white matter of both hemispheres with atrophy. No   hemorrhagic lesion or acute territorial infarct is suggested.   VENTRICLES: Midline and normal in size.   POSTERIOR FOSSA: A tortuous and ectatic vertebrobasilar system is again   noted. This appears to be stable and unchanged as compared to prior study.   EXTRACEREBRAL SPACES: No subdural or epidural collections are noted.   SKULL BASE AND CALVARIUM: Appears intact. No fracture or destructive   lesion is identified.   SINUSES AND MASTOIDS: Clear.   MISCELLANEOUS: No orbital or suprasellar abnormality noted.   IMPRESSION:   1) extensive chronic ischemic changes with volume loss. No acute   abnormality suggested..   2) no intracerebral hemorrhage or contusion is identified.                CT HEAD: 11:19PM There is no acute intracranial mass-effect, hemorrhage, midline shift, or   abnormal extra-axial fluid collection.   Patchy and confluent regions of periventricular and deep cerebral white   matter hypoattenuation due to chronic microangiopathic ischemic changes.   Atheromatous calcifications along the carotid siphons are present.   Vertebrobasilar dolichoectasia is noted.   Chronic bilateral gangliocapsular, corona radiata, centrum semiovale lacunar   infarctions are present.   Mild centrally predominant cerebral volume loss noted. No evidence of   hydrocephalus. Basal cisterns are patent.   Paranasal sinuses and mastoid air cells are clear. Calvarium is intact.   IMPRESSION:   No acute intracranial bleeding, mass effect, or shift.   Volume loss, chronic microvascular ischemic changes, and chronic lacunar   infarctions.   Vertebrobasilar dolichoectasia.                   12.2   8.55  )-----------( 242      ( 10 Jul 2019 22:42 )             36.9     07-10    136  |  98  |  15.0  ----------------------------<  138<H>  4.2   |  27.0  |  0.78    Ca    8.8      10 Jul 2019 22:42    TPro  6.4<L>  /  Alb  3.1<L>  /  TBili  1.2  /  DBili  x   /  AST  24  /  ALT  18  /  AlkPhos  115  07-10      Urinalysis Basic - ( 2019 00:27 )    Color: Nikky / Appearance: Clear / S.025 / pH: x  Gluc: x / Ketone: Trace  / Bili: Small / Urobili: 8 mg/dL   Blood: x / Protein: 30 mg/dL / Nitrite: Negative   Leuk Esterase: Trace / RBC: 0-2 /HPF / WBC 0-2   Sq Epi: x / Non Sq Epi: Occasional / Bacteria: Occasional

## 2019-07-11 NOTE — H&P ADULT - HISTORY OF PRESENT ILLNESS
89 y/o male with hx of Dementia, old CVA, Colon Ca, Prostate Ca, Chronic anemia, HLD brought to ED from [facility] due to progressive generalized weakness, failure to thrive, increasing confusion and incontinence. No reported falls, fever, chills, N/V, SOB. IN the ED patient with low grade temp, left shift, CXR with RLL PNA. CT head showed vertebrobasilar dolichoectasia, and old CVA. Patient had blood/urine cx, was given abx. Lactate normal. Patient poor historian, no records available from [facility] at this time.

## 2019-07-11 NOTE — CHART NOTE - NSCHARTNOTEFT_GEN_A_CORE
Notified by RN that pt passed dysphagia screen now, changed from NPO to soft diet, can advance further as tolerated.

## 2019-07-11 NOTE — H&P ADULT - PROBLEM SELECTOR PLAN 1
Admit to medical bed, cont. CAP coverage, trend Temp/WBC, probiotics, f/u cultures, spirometer, chest pt, OOB, PT, Fall risk, ambulate with assistance.

## 2019-07-12 LAB
ANION GAP SERPL CALC-SCNC: 8 MMOL/L — SIGNIFICANT CHANGE UP (ref 5–17)
BASOPHILS # BLD AUTO: 0.02 K/UL — SIGNIFICANT CHANGE UP (ref 0–0.2)
BASOPHILS NFR BLD AUTO: 0.3 % — SIGNIFICANT CHANGE UP (ref 0–2)
BUN SERPL-MCNC: 8 MG/DL — SIGNIFICANT CHANGE UP (ref 8–20)
CALCIUM SERPL-MCNC: 8.3 MG/DL — LOW (ref 8.6–10.2)
CHLORIDE SERPL-SCNC: 97 MMOL/L — LOW (ref 98–107)
CO2 SERPL-SCNC: 28 MMOL/L — SIGNIFICANT CHANGE UP (ref 22–29)
CREAT SERPL-MCNC: 0.64 MG/DL — SIGNIFICANT CHANGE UP (ref 0.5–1.3)
CULTURE RESULTS: NO GROWTH — SIGNIFICANT CHANGE UP
EOSINOPHIL # BLD AUTO: 0.03 K/UL — SIGNIFICANT CHANGE UP (ref 0–0.5)
EOSINOPHIL NFR BLD AUTO: 0.4 % — SIGNIFICANT CHANGE UP (ref 0–6)
GLUCOSE SERPL-MCNC: 156 MG/DL — HIGH (ref 70–115)
HCT VFR BLD CALC: 34 % — LOW (ref 39–50)
HGB BLD-MCNC: 11 G/DL — LOW (ref 13–17)
IMM GRANULOCYTES NFR BLD AUTO: 0.4 % — SIGNIFICANT CHANGE UP (ref 0–1.5)
LYMPHOCYTES # BLD AUTO: 0.31 K/UL — LOW (ref 1–3.3)
LYMPHOCYTES # BLD AUTO: 4.6 % — LOW (ref 13–44)
MAGNESIUM SERPL-MCNC: 1.8 MG/DL — SIGNIFICANT CHANGE UP (ref 1.6–2.6)
MCHC RBC-ENTMCNC: 29.3 PG — SIGNIFICANT CHANGE UP (ref 27–34)
MCHC RBC-ENTMCNC: 32.4 GM/DL — SIGNIFICANT CHANGE UP (ref 32–36)
MCV RBC AUTO: 90.7 FL — SIGNIFICANT CHANGE UP (ref 80–100)
MONOCYTES # BLD AUTO: 0.65 K/UL — SIGNIFICANT CHANGE UP (ref 0–0.9)
MONOCYTES NFR BLD AUTO: 9.7 % — SIGNIFICANT CHANGE UP (ref 2–14)
NEUTROPHILS # BLD AUTO: 5.66 K/UL — SIGNIFICANT CHANGE UP (ref 1.8–7.4)
NEUTROPHILS NFR BLD AUTO: 84.6 % — HIGH (ref 43–77)
PHOSPHATE SERPL-MCNC: 2.1 MG/DL — LOW (ref 2.4–4.7)
PLATELET # BLD AUTO: 222 K/UL — SIGNIFICANT CHANGE UP (ref 150–400)
POTASSIUM SERPL-MCNC: 3.6 MMOL/L — SIGNIFICANT CHANGE UP (ref 3.5–5.3)
POTASSIUM SERPL-SCNC: 3.6 MMOL/L — SIGNIFICANT CHANGE UP (ref 3.5–5.3)
RBC # BLD: 3.75 M/UL — LOW (ref 4.2–5.8)
RBC # FLD: 12.3 % — SIGNIFICANT CHANGE UP (ref 10.3–14.5)
SODIUM SERPL-SCNC: 133 MMOL/L — LOW (ref 135–145)
SPECIMEN SOURCE: SIGNIFICANT CHANGE UP
WBC # BLD: 6.7 K/UL — SIGNIFICANT CHANGE UP (ref 3.8–10.5)
WBC # FLD AUTO: 6.7 K/UL — SIGNIFICANT CHANGE UP (ref 3.8–10.5)

## 2019-07-12 PROCEDURE — 99233 SBSQ HOSP IP/OBS HIGH 50: CPT

## 2019-07-12 RX ORDER — AMLODIPINE BESYLATE 2.5 MG/1
5 TABLET ORAL DAILY
Refills: 0 | Status: DISCONTINUED | OUTPATIENT
Start: 2019-07-12 | End: 2019-07-15

## 2019-07-12 RX ADMIN — MEMANTINE HYDROCHLORIDE 10 MILLIGRAM(S): 10 TABLET ORAL at 05:19

## 2019-07-12 RX ADMIN — Medication 250 MILLIGRAM(S): at 17:54

## 2019-07-12 RX ADMIN — SODIUM CHLORIDE 75 MILLILITER(S): 9 INJECTION, SOLUTION INTRAVENOUS at 18:37

## 2019-07-12 RX ADMIN — MEMANTINE HYDROCHLORIDE 10 MILLIGRAM(S): 10 TABLET ORAL at 17:54

## 2019-07-12 RX ADMIN — Medication 250 MILLIGRAM(S): at 05:19

## 2019-07-12 RX ADMIN — Medication 81 MILLIGRAM(S): at 13:24

## 2019-07-12 RX ADMIN — SODIUM CHLORIDE 3 MILLILITER(S): 9 INJECTION INTRAMUSCULAR; INTRAVENOUS; SUBCUTANEOUS at 14:35

## 2019-07-12 RX ADMIN — ESCITALOPRAM OXALATE 20 MILLIGRAM(S): 10 TABLET, FILM COATED ORAL at 13:24

## 2019-07-12 RX ADMIN — ATORVASTATIN CALCIUM 20 MILLIGRAM(S): 80 TABLET, FILM COATED ORAL at 21:37

## 2019-07-12 RX ADMIN — SODIUM CHLORIDE 125 MILLILITER(S): 9 INJECTION, SOLUTION INTRAVENOUS at 00:03

## 2019-07-12 RX ADMIN — SODIUM CHLORIDE 3 MILLILITER(S): 9 INJECTION INTRAMUSCULAR; INTRAVENOUS; SUBCUTANEOUS at 05:13

## 2019-07-12 RX ADMIN — PANTOPRAZOLE SODIUM 40 MILLIGRAM(S): 20 TABLET, DELAYED RELEASE ORAL at 05:19

## 2019-07-12 RX ADMIN — AZITHROMYCIN 255 MILLIGRAM(S): 500 TABLET, FILM COATED ORAL at 05:21

## 2019-07-12 RX ADMIN — DONEPEZIL HYDROCHLORIDE 10 MILLIGRAM(S): 10 TABLET, FILM COATED ORAL at 21:37

## 2019-07-12 RX ADMIN — SODIUM CHLORIDE 125 MILLILITER(S): 9 INJECTION, SOLUTION INTRAVENOUS at 17:54

## 2019-07-12 RX ADMIN — QUETIAPINE FUMARATE 25 MILLIGRAM(S): 200 TABLET, FILM COATED ORAL at 21:37

## 2019-07-12 NOTE — SWALLOW BEDSIDE ASSESSMENT ADULT - ASR SWALLOW RECOMMEND DIAG
to rule out silent aspiration at MD discretion/VFSS/MBS VFSS/MBS/to rule out silent aspiration at MD discretion  dependent upon participation/cognitive status

## 2019-07-12 NOTE — SWALLOW BEDSIDE ASSESSMENT ADULT - SLP PERTINENT HISTORY OF CURRENT PROBLEM
Per H&P section: "87 y/o male with CAP, VBD, Dementia, HTN, HLD, Colon Ca, prostate Ca".  Pt passed dysphagia screen per RN, started on Soft diet, tolerating well.

## 2019-07-12 NOTE — SWALLOW BEDSIDE ASSESSMENT ADULT - ASR SWALLOW ASPIRATION MONITOR
position upright (89Y)/cough/fever/throat clearing/upper respiratory infection/oral hygiene/change of breathing pattern/gurgly voice/pneumonia

## 2019-07-12 NOTE — PROGRESS NOTE ADULT - SUBJECTIVE AND OBJECTIVE BOX
Altered mental status      HPI:  87 y/o male with hx of Dementia, old CVA, Colon Ca, Prostate Ca, Chronic anemia, HLD brought to ED from [facility] due to progressive generalized weakness, failure to thrive, increasing confusion and incontinence. No reported falls, fever, chills, N/V, SOB. IN the ED patient with low grade temp, left shift, CXR with RLL PNA. CT head showed vertebrobasilar dolichoectasia, and old CVA. Patient had blood/urine cx, was given abx. Lactate normal. Patient poor historian, no records available from [facility] at this time. (2019 05:07)    Interval History:  Patient was seen and examined at bedside around 9 am. More alert and awake today.   Denies chest pain, palpitations, shortness of breath, headache, nausea, vomiting or abdominal pain.  Able to tolerate PO.     ROS:  As per interval history otherwise unremarkable.    PHYSICAL EXAM:  Vital Signs T(C): 37.1 (2019 15:37), Max: 37.1 (2019 19:39)  T(F): 98.8 (2019 15:37), Max: 98.8 (2019 19:39)  HR: 63 (2019 15:37) (58 - 106)  BP: 152/66 (2019 15:37) (134/78 - 171/76)  RR: 18 (2019 15:37) (16 - 18)  SpO2: 93% (2019 15:37) (93% - 97%)  General: Elderly male lying in bed comfortably. No acute distress  HEENT: PERRLA. EOMI. Clear conjunctivae. Moist mucus membrane  Neck: Supple.   Chest: CTA bilaterally - no wheezing, rales or rhonchi.   Heart: Normal S1 & S2 with RRR.   Abdomen: Soft. Non-tender. Non-distended. + BS  Ext: No pedal edema. No calf tenderness   Neuro: Awake and alert but very lethargic. Moves all four extremities.   Skin: Warm and Dry  Psychiatry: Normal affect    I&O's Summary    2019 07:01  -  2019 07:00  --------------------------------------------------------  IN: 1800 mL / OUT: 0 mL / NET: 1800 mL    MEDICATIONS  (STANDING):  amLODIPine   Tablet 5 milliGRAM(s) Oral daily  aspirin enteric coated 81 milliGRAM(s) Oral daily  atorvastatin 20 milliGRAM(s) Oral at bedtime  azithromycin  IVPB      azithromycin  IVPB 500 milliGRAM(s) IV Intermittent every 24 hours  cefTRIAXone   IVPB 1000 milliGRAM(s) IV Intermittent every 24 hours  dextrose 5% + sodium chloride 0.9%. 1000 milliLiter(s) (125 mL/Hr) IV Continuous <Continuous>  donepezil 10 milliGRAM(s) Oral at bedtime  enoxaparin Injectable 40 milliGRAM(s) SubCutaneous daily  escitalopram 20 milliGRAM(s) Oral daily  memantine 10 milliGRAM(s) Oral two times a day  multivitamin 1 Tablet(s) Oral daily  pantoprazole    Tablet 40 milliGRAM(s) Oral before breakfast  QUEtiapine 25 milliGRAM(s) Oral at bedtime  saccharomyces boulardii 250 milliGRAM(s) Oral two times a day  sodium chloride 0.9% lock flush 3 milliLiter(s) IV Push every 8 hours    MEDICATIONS  (PRN):  ALBUTerol/ipratropium for Nebulization 3 milliLiter(s) Nebulizer every 6 hours PRN Shortness of Breath and/or Wheezing  ondansetron Injectable 4 milliGRAM(s) IV Push every 6 hours PRN Nausea    LABS:                        11.0   6.70  )-----------( 222      ( 2019 07:26 )             34.0     07-12    133<L>  |  97<L>  |  8.0  ----------------------------<  156<H>  3.6   |  28.0  |  0.64    Ca    8.3<L>      2019 07:26  Phos  2.1     07-12  Mg     1.8     07-12    TPro  6.4<L>  /  Alb  3.1<L>  /  TBili  1.2  /  DBili  x   /  AST  24  /  ALT  18  /  AlkPhos  115  07-10      Urinalysis Basic - ( 2019 00:27 )    Color: Nikky / Appearance: Clear / S.025 / pH: x  Gluc: x / Ketone: Trace  / Bili: Small / Urobili: 8 mg/dL   Blood: x / Protein: 30 mg/dL / Nitrite: Negative   Leuk Esterase: Trace / RBC: 0-2 /HPF / WBC 0-2   Sq Epi: x / Non Sq Epi: Occasional / Bacteria: Occasional    RADIOLOGY & ADDITIONAL STUDIES: Altered mental status      HPI:  89 y/o male with hx of Dementia, old CVA, Colon Ca, Prostate Ca, Chronic anemia, HLD brought to ED from [facility] due to progressive generalized weakness, failure to thrive, increasing confusion and incontinence. No reported falls, fever, chills, N/V, SOB. IN the ED patient with low grade temp, left shift, CXR with RLL PNA. CT head showed vertebrobasilar dolichoectasia, and old CVA. Patient had blood/urine cx, was given abx. Lactate normal. Patient poor historian, no records available from [facility] at this time. (2019 05:07)    Interval History:  Patient was seen and examined at bedside around 9 am. More awake today.   Denies chest pain, palpitations, shortness of breath, headache, nausea, vomiting or abdominal pain.  Able to tolerate PO.     ROS:  As per interval history otherwise unremarkable.    PHYSICAL EXAM:  Vital Signs T(C): 37.1 (2019 15:37), Max: 37.1 (2019 19:39)  T(F): 98.8 (2019 15:37), Max: 98.8 (2019 19:39)  HR: 63 (2019 15:37) (58 - 106)  BP: 152/66 (2019 15:37) (134/78 - 171/76)  RR: 18 (2019 15:37) (16 - 18)  SpO2: 93% (2019 15:37) (93% - 97%)  General: Elderly male lying in bed comfortably. No acute distress  HEENT: PERRLA. EOMI. Clear conjunctivae. Moist mucus membrane  Neck: Supple.   Chest: CTA bilaterally - no wheezing, rales or rhonchi.   Heart: Normal S1 & S2 with RRR.   Abdomen: Soft. Non-tender. Non-distended. + BS  Ext: No pedal edema. No calf tenderness   Neuro: Awake but very lethargic. Moves all four extremities. Speech slow.   Skin: Warm and Dry  Psychiatry: Normal affect    I&O's Summary    2019 07:01  -  2019 07:00  --------------------------------------------------------  IN: 1800 mL / OUT: 0 mL / NET: 1800 mL    MEDICATIONS  (STANDING):  amLODIPine   Tablet 5 milliGRAM(s) Oral daily  aspirin enteric coated 81 milliGRAM(s) Oral daily  atorvastatin 20 milliGRAM(s) Oral at bedtime  azithromycin  IVPB      azithromycin  IVPB 500 milliGRAM(s) IV Intermittent every 24 hours  cefTRIAXone   IVPB 1000 milliGRAM(s) IV Intermittent every 24 hours  dextrose 5% + sodium chloride 0.9%. 1000 milliLiter(s) (125 mL/Hr) IV Continuous <Continuous>  donepezil 10 milliGRAM(s) Oral at bedtime  enoxaparin Injectable 40 milliGRAM(s) SubCutaneous daily  escitalopram 20 milliGRAM(s) Oral daily  memantine 10 milliGRAM(s) Oral two times a day  multivitamin 1 Tablet(s) Oral daily  pantoprazole    Tablet 40 milliGRAM(s) Oral before breakfast  QUEtiapine 25 milliGRAM(s) Oral at bedtime  saccharomyces boulardii 250 milliGRAM(s) Oral two times a day  sodium chloride 0.9% lock flush 3 milliLiter(s) IV Push every 8 hours    MEDICATIONS  (PRN):  ALBUTerol/ipratropium for Nebulization 3 milliLiter(s) Nebulizer every 6 hours PRN Shortness of Breath and/or Wheezing  ondansetron Injectable 4 milliGRAM(s) IV Push every 6 hours PRN Nausea    LABS:                        11.0   6.70  )-----------( 222      ( 2019 07:26 )             34.0     07-12    133<L>  |  97<L>  |  8.0  ----------------------------<  156<H>  3.6   |  28.0  |  0.64    Ca    8.3<L>      2019 07:26  Phos  2.1     07-12  Mg     1.8     07-12    TPro  6.4<L>  /  Alb  3.1<L>  /  TBili  1.2  /  DBili  x   /  AST  24  /  ALT  18  /  AlkPhos  115  07-10      Urinalysis Basic - ( 2019 00:27 )    Color: Nikky / Appearance: Clear / S.025 / pH: x  Gluc: x / Ketone: Trace  / Bili: Small / Urobili: 8 mg/dL   Blood: x / Protein: 30 mg/dL / Nitrite: Negative   Leuk Esterase: Trace / RBC: 0-2 /HPF / WBC 0-2   Sq Epi: x / Non Sq Epi: Occasional / Bacteria: Occasional    RADIOLOGY & ADDITIONAL STUDIES:  CT Head No Cont (07.10.19 @ 23:30)   No acute intracranial bleeding, mass effect, or shift.   Volume loss, chronic microvascular ischemic changes, and chronic lacunar infarctions.  Vertebrobasilar dolichoectasia.    Xray Chest 1 View- PORTABLE-Urgent (19 @ 00:27)  Bilateral lower lobe infiltrates.

## 2019-07-12 NOTE — SWALLOW BEDSIDE ASSESSMENT ADULT - SWALLOW EVAL: DIAGNOSIS
Oral & pharyngeal stages deemed WFL, no overt s/s aspiration noted.  If silent aspiration is of concern, rx to consider MBS for objective view of pharyngeal stage. Oral & pharyngeal stages deemed WFL, no overt s/s aspiration noted.  If silent aspiration is of concern, rx to consider MBS for objective view of pharyngeal stage at discretion of MD, dependent upon participation/cognitive status.

## 2019-07-13 LAB
ANION GAP SERPL CALC-SCNC: 11 MMOL/L — SIGNIFICANT CHANGE UP (ref 5–17)
BASOPHILS # BLD AUTO: 0.03 K/UL — SIGNIFICANT CHANGE UP (ref 0–0.2)
BASOPHILS NFR BLD AUTO: 0.6 % — SIGNIFICANT CHANGE UP (ref 0–2)
BUN SERPL-MCNC: 8 MG/DL — SIGNIFICANT CHANGE UP (ref 8–20)
CALCIUM SERPL-MCNC: 8.6 MG/DL — SIGNIFICANT CHANGE UP (ref 8.6–10.2)
CHLORIDE SERPL-SCNC: 101 MMOL/L — SIGNIFICANT CHANGE UP (ref 98–107)
CO2 SERPL-SCNC: 28 MMOL/L — SIGNIFICANT CHANGE UP (ref 22–29)
CREAT SERPL-MCNC: 0.59 MG/DL — SIGNIFICANT CHANGE UP (ref 0.5–1.3)
EOSINOPHIL # BLD AUTO: 0.06 K/UL — SIGNIFICANT CHANGE UP (ref 0–0.5)
EOSINOPHIL NFR BLD AUTO: 1.2 % — SIGNIFICANT CHANGE UP (ref 0–6)
GLUCOSE SERPL-MCNC: 155 MG/DL — HIGH (ref 70–115)
HCT VFR BLD CALC: 35.1 % — LOW (ref 39–50)
HGB BLD-MCNC: 11.8 G/DL — LOW (ref 13–17)
IMM GRANULOCYTES NFR BLD AUTO: 1.2 % — SIGNIFICANT CHANGE UP (ref 0–1.5)
LYMPHOCYTES # BLD AUTO: 0.39 K/UL — LOW (ref 1–3.3)
LYMPHOCYTES # BLD AUTO: 7.6 % — LOW (ref 13–44)
MAGNESIUM SERPL-MCNC: 1.6 MG/DL — SIGNIFICANT CHANGE UP (ref 1.6–2.6)
MCHC RBC-ENTMCNC: 29.9 PG — SIGNIFICANT CHANGE UP (ref 27–34)
MCHC RBC-ENTMCNC: 33.6 GM/DL — SIGNIFICANT CHANGE UP (ref 32–36)
MCV RBC AUTO: 88.9 FL — SIGNIFICANT CHANGE UP (ref 80–100)
MONOCYTES # BLD AUTO: 0.49 K/UL — SIGNIFICANT CHANGE UP (ref 0–0.9)
MONOCYTES NFR BLD AUTO: 9.5 % — SIGNIFICANT CHANGE UP (ref 2–14)
NEUTROPHILS # BLD AUTO: 4.11 K/UL — SIGNIFICANT CHANGE UP (ref 1.8–7.4)
NEUTROPHILS NFR BLD AUTO: 79.9 % — HIGH (ref 43–77)
PHOSPHATE SERPL-MCNC: 2.3 MG/DL — LOW (ref 2.4–4.7)
PLATELET # BLD AUTO: 263 K/UL — SIGNIFICANT CHANGE UP (ref 150–400)
POTASSIUM SERPL-MCNC: 3.2 MMOL/L — LOW (ref 3.5–5.3)
POTASSIUM SERPL-SCNC: 3.2 MMOL/L — LOW (ref 3.5–5.3)
RBC # BLD: 3.95 M/UL — LOW (ref 4.2–5.8)
RBC # FLD: 12.1 % — SIGNIFICANT CHANGE UP (ref 10.3–14.5)
SODIUM SERPL-SCNC: 140 MMOL/L — SIGNIFICANT CHANGE UP (ref 135–145)
WBC # BLD: 5.14 K/UL — SIGNIFICANT CHANGE UP (ref 3.8–10.5)
WBC # FLD AUTO: 5.14 K/UL — SIGNIFICANT CHANGE UP (ref 3.8–10.5)

## 2019-07-13 PROCEDURE — 99232 SBSQ HOSP IP/OBS MODERATE 35: CPT

## 2019-07-13 RX ORDER — POTASSIUM CHLORIDE 20 MEQ
40 PACKET (EA) ORAL ONCE
Refills: 0 | Status: COMPLETED | OUTPATIENT
Start: 2019-07-13 | End: 2019-07-13

## 2019-07-13 RX ORDER — SODIUM,POTASSIUM PHOSPHATES 278-250MG
1 POWDER IN PACKET (EA) ORAL
Refills: 0 | Status: DISCONTINUED | OUTPATIENT
Start: 2019-07-13 | End: 2019-07-15

## 2019-07-13 RX ADMIN — SODIUM CHLORIDE 3 MILLILITER(S): 9 INJECTION INTRAMUSCULAR; INTRAVENOUS; SUBCUTANEOUS at 22:13

## 2019-07-13 RX ADMIN — ESCITALOPRAM OXALATE 20 MILLIGRAM(S): 10 TABLET, FILM COATED ORAL at 11:36

## 2019-07-13 RX ADMIN — PANTOPRAZOLE SODIUM 40 MILLIGRAM(S): 20 TABLET, DELAYED RELEASE ORAL at 05:56

## 2019-07-13 RX ADMIN — SODIUM CHLORIDE 75 MILLILITER(S): 9 INJECTION, SOLUTION INTRAVENOUS at 05:57

## 2019-07-13 RX ADMIN — Medication 40 MILLIEQUIVALENT(S): at 16:33

## 2019-07-13 RX ADMIN — ENOXAPARIN SODIUM 40 MILLIGRAM(S): 100 INJECTION SUBCUTANEOUS at 11:36

## 2019-07-13 RX ADMIN — AMLODIPINE BESYLATE 5 MILLIGRAM(S): 2.5 TABLET ORAL at 05:56

## 2019-07-13 RX ADMIN — Medication 1 TABLET(S): at 16:35

## 2019-07-13 RX ADMIN — MEMANTINE HYDROCHLORIDE 10 MILLIGRAM(S): 10 TABLET ORAL at 17:26

## 2019-07-13 RX ADMIN — CEFTRIAXONE 100 MILLIGRAM(S): 500 INJECTION, POWDER, FOR SOLUTION INTRAMUSCULAR; INTRAVENOUS at 01:09

## 2019-07-13 RX ADMIN — CEFTRIAXONE 100 MILLIGRAM(S): 500 INJECTION, POWDER, FOR SOLUTION INTRAMUSCULAR; INTRAVENOUS at 21:55

## 2019-07-13 RX ADMIN — AZITHROMYCIN 255 MILLIGRAM(S): 500 TABLET, FILM COATED ORAL at 04:08

## 2019-07-13 RX ADMIN — MEMANTINE HYDROCHLORIDE 10 MILLIGRAM(S): 10 TABLET ORAL at 05:56

## 2019-07-13 RX ADMIN — Medication 250 MILLIGRAM(S): at 05:56

## 2019-07-13 RX ADMIN — Medication 81 MILLIGRAM(S): at 11:36

## 2019-07-13 RX ADMIN — SODIUM CHLORIDE 3 MILLILITER(S): 9 INJECTION INTRAMUSCULAR; INTRAVENOUS; SUBCUTANEOUS at 05:57

## 2019-07-13 RX ADMIN — SODIUM CHLORIDE 3 MILLILITER(S): 9 INJECTION INTRAMUSCULAR; INTRAVENOUS; SUBCUTANEOUS at 00:24

## 2019-07-13 RX ADMIN — Medication 250 MILLIGRAM(S): at 17:26

## 2019-07-13 RX ADMIN — Medication 1 TABLET(S): at 11:36

## 2019-07-13 RX ADMIN — SODIUM CHLORIDE 3 MILLILITER(S): 9 INJECTION INTRAMUSCULAR; INTRAVENOUS; SUBCUTANEOUS at 11:36

## 2019-07-13 NOTE — PROGRESS NOTE ADULT - SUBJECTIVE AND OBJECTIVE BOX
HEALTH ISSUES - PROBLEM Dx:    CAP, dementia    INTERVAL HPI/ OVERNIGHT EVENTS:    sleeping comfortably, awakened easily  denies sob/ cough    REVIEW OF SYSTEMS:    cough- sob - fever-    Vital Signs Last 24 Hrs  T(C): 36.6 (13 Jul 2019 08:57), Max: 37.1 (12 Jul 2019 15:37)  T(F): 97.9 (13 Jul 2019 08:57), Max: 98.8 (12 Jul 2019 15:37)  HR: 68 (13 Jul 2019 08:57) (63 - 71)  BP: 154/74 (13 Jul 2019 08:57) (138/72 - 160/76)  BP(mean): --  RR: 18 (13 Jul 2019 08:57) (18 - 18)  SpO2: 95% (13 Jul 2019 08:57) (93% - 95%)    PHYSICAL EXAM-  General: Elderly male lying in bed comfortably. No acute distress  HEENT: PERRLA. EOMI. Clear conjunctivae. Moist mucus membrane  Neck: Supple.   Chest: CTA bilaterally - no wheezing, rales or rhonchi.   Heart: Normal S1 & S2 with RRR.   Abdomen: Soft. Non-tender. Non-distended. + BS  Ext: No pedal edema. No calf tenderness   Neuro: Awake but very lethargic. Moves all four extremities. Speech slow.   Skin: Warm and Dry  Psychiatry: Normal affect    LABS:                        11.8   5.14  )-----------( 263      ( 13 Jul 2019 06:12 )             35.1     07-13    140  |  101  |  8.0  ----------------------------<  155<H>  3.2<L>   |  28.0  |  0.59    Ca    8.6      13 Jul 2019 06:12  Phos  2.3     07-13  Mg     1.6     07-13    Assessment and Plan

## 2019-07-13 NOTE — DIETITIAN INITIAL EVALUATION ADULT. - PERTINENT LABORATORY DATA
07-13 Na140 mmol/L Glu 155 mg/dL<H> K+ 3.2 mmol/L<L> Cr  0.59 mg/dL BUN 8.0 mg/dL Phos 2.3 mg/dL<L> Alb n/a   PAB n/a

## 2019-07-13 NOTE — DIETITIAN INITIAL EVALUATION ADULT. - OTHER INFO
87 y/o male with hx of Dementia, old CVA, Colon Ca, Prostate Ca, Chronic anemia, HLD brought to ED from [facility] due to progressive generalized weakness, failure to thrive, increasing confusion and incontinence. No reported falls, fever, chills, N/V, SOB. IN the ED patient with low grade temp, left shift, CXR with RLL PNA. CT head showed vertebrobasilar dolichoectasia, and old CVA. Patient had blood/urine cx, was given abx. Lactate normal. Patient poor historian, no records available from [facility] at this time. Pt confused during interview and not answering questions. Appears confused. Last RD assessment done June 2017, pt was 155#. Current weight 158#. Bedscale today 149#.

## 2019-07-14 LAB — LEGIONELLA AG UR QL: NEGATIVE — SIGNIFICANT CHANGE UP

## 2019-07-14 PROCEDURE — 99232 SBSQ HOSP IP/OBS MODERATE 35: CPT

## 2019-07-14 RX ADMIN — Medication 250 MILLIGRAM(S): at 17:50

## 2019-07-14 RX ADMIN — ENOXAPARIN SODIUM 40 MILLIGRAM(S): 100 INJECTION SUBCUTANEOUS at 12:29

## 2019-07-14 RX ADMIN — SODIUM CHLORIDE 3 MILLILITER(S): 9 INJECTION INTRAMUSCULAR; INTRAVENOUS; SUBCUTANEOUS at 22:00

## 2019-07-14 RX ADMIN — MEMANTINE HYDROCHLORIDE 10 MILLIGRAM(S): 10 TABLET ORAL at 17:51

## 2019-07-14 RX ADMIN — SODIUM CHLORIDE 3 MILLILITER(S): 9 INJECTION INTRAMUSCULAR; INTRAVENOUS; SUBCUTANEOUS at 14:00

## 2019-07-14 RX ADMIN — PANTOPRAZOLE SODIUM 40 MILLIGRAM(S): 20 TABLET, DELAYED RELEASE ORAL at 05:54

## 2019-07-14 RX ADMIN — ESCITALOPRAM OXALATE 20 MILLIGRAM(S): 10 TABLET, FILM COATED ORAL at 17:50

## 2019-07-14 RX ADMIN — CEFTRIAXONE 100 MILLIGRAM(S): 500 INJECTION, POWDER, FOR SOLUTION INTRAMUSCULAR; INTRAVENOUS at 23:30

## 2019-07-14 RX ADMIN — MEMANTINE HYDROCHLORIDE 10 MILLIGRAM(S): 10 TABLET ORAL at 05:53

## 2019-07-14 RX ADMIN — Medication 250 MILLIGRAM(S): at 05:53

## 2019-07-14 RX ADMIN — Medication 1 TABLET(S): at 12:30

## 2019-07-14 RX ADMIN — SODIUM CHLORIDE 3 MILLILITER(S): 9 INJECTION INTRAMUSCULAR; INTRAVENOUS; SUBCUTANEOUS at 05:54

## 2019-07-14 RX ADMIN — DONEPEZIL HYDROCHLORIDE 10 MILLIGRAM(S): 10 TABLET, FILM COATED ORAL at 21:28

## 2019-07-14 RX ADMIN — AMLODIPINE BESYLATE 5 MILLIGRAM(S): 2.5 TABLET ORAL at 05:53

## 2019-07-14 RX ADMIN — QUETIAPINE FUMARATE 25 MILLIGRAM(S): 200 TABLET, FILM COATED ORAL at 21:28

## 2019-07-14 RX ADMIN — Medication 81 MILLIGRAM(S): at 12:29

## 2019-07-14 RX ADMIN — Medication 1 TABLET(S): at 08:57

## 2019-07-14 RX ADMIN — ATORVASTATIN CALCIUM 20 MILLIGRAM(S): 80 TABLET, FILM COATED ORAL at 21:28

## 2019-07-14 RX ADMIN — AZITHROMYCIN 255 MILLIGRAM(S): 500 TABLET, FILM COATED ORAL at 04:12

## 2019-07-14 RX ADMIN — Medication 1 TABLET(S): at 17:50

## 2019-07-14 NOTE — PROGRESS NOTE ADULT - ASSESSMENT
87 y/o male with CAP, VBD, Dementia, HTN, HLD, Colon Ca and prostate Ca,    1) CAP (community acquired pneumonia) (likely gram positive vs atypical bacteria)  - Continue Rocephin and Zithromax Day 3  - Blood cultures negative  - Urine Legionella Antigen testing    2) Dementia  - Continue Aricept and Namenda    3) HTN  - Added Amlodipine 5 mg this am     4) HLD  - Continue Lipitor    5) Vertebrobasilar dolichoectasia  - Neurosurgery Consult appreciated  - Discussed with family - Roel Daly (Son), does not want further work up    6) Dysphagia  - Seen by Speech Therapist. Able to tolerate Regular with thin. Will monitor over the weekend. If swallowing issues persist, May need MBS on Monday. IVF d/candelario    7) s/p Hyponatremia  - Mild adn resolved  - Likely due to decreased PO intake    8) Acute Metabolic Encephalopathy seen initially on admission is now resolved. at baseline dementia    9) Hypokalemia, Hypophosphatemia  - replete    DVT Prophylaxis -- Lovenox 40 mg    Dispo: [facility] likely on Monday / Tuesday.
89 y/o male with CAP, VBD, Dementia, HTN, HLD, Colon Ca and prostate Ca,    1) CAP (community acquired pneumonia) (likely gram positive vs atypical bacteria)  - Continue Rocephin and Zithromax Day 4  - Blood cultures negative  - Urine Legionella Antigen testing    2) Dementia  - Continue Aricept and Namenda    3) HTN  - Added Amlodipine 5 mg this am     4) HLD  - Continue Lipitor    5) Vertebrobasilar dolichoectasia  - Neurosurgery Consult appreciated  - Discussed with family - Roel Daly (Son), does not want further work up    6) Dysphagia  - Seen by Speech Therapist. Able to tolerate Regular with thin. Will monitor over the weekend. If swallowing issues persist, May need MBS. IVF d/candelario    7) s/p Hyponatremia  - Mild adn resolved  - Likely due to decreased PO intake    8) Acute Metabolic Encephalopathy seen initially on admission is now resolved. at baseline dementia    9) Hypokalemia, Hypophosphatemia  - replete    DVT Prophylaxis -- Lovenox 40 mg    Dispo: [facility] likely on Monday / Tuesday.
87 y/o male with CAP, VBD, Dementia, HTN, HLD, Colon Ca and prostate Ca,    1) CAP (community acquired pneumonia) (likely gram positive vs atypical bacteria)  - Continue Rocephin and Zithromax  - Blood cultures pending  - Urine Legionella Antigen    2) Dementia  - Continue Aricept and Namenda    3) HTN  - Added Amlodipine 5 mg this am     4) HLD  - Continue Lipitor    5) Vertebrobasilar dolichoectasia  - Neurosurgery Consult appreciated  - Discussed with family - Roel Daly (Son), does not want further work up    6) Dysphagia  - Seen by Speech Therapist. Able to tolerate Regular with thin. Will monitor over the weekend. May need MBS on Monday.    7) Hyponatremia  - Mild  - Likely due to decreased PO intake  - Repeat BMP in am    DVT Prophylaxis -- Lovenox 40 mg    Dispo: [facility] likely on Monday / Tuesday.

## 2019-07-14 NOTE — PROGRESS NOTE ADULT - SUBJECTIVE AND OBJECTIVE BOX
HEALTH ISSUES - PROBLEM Dx:    CAP, dementia    INTERVAL HPI/ OVERNIGHT EVENTS:    sleeping comfortably, awakened easily  denies sob/ cough  noted overnight events of refusing night time meds    REVIEW OF SYSTEMS:    cough- sob - fever-    Vital Signs Last 24 Hrs  T(C): 36.7 (14 Jul 2019 08:51), Max: 37.1 (13 Jul 2019 16:36)  T(F): 98 (14 Jul 2019 08:51), Max: 98.7 (13 Jul 2019 16:36)  HR: 60 (14 Jul 2019 08:51) (60 - 82)  BP: 145/69 (14 Jul 2019 08:51) (127/72 - 145/69)  BP(mean): --  RR: 18 (14 Jul 2019 08:51) (18 - 18)  SpO2: 98% (14 Jul 2019 08:51) (96% - 100%)    PHYSICAL EXAM-  General: Elderly male lying in bed comfortably. No acute distress  HEENT: PERRLA. EOMI. Clear conjunctivae. Moist mucus membrane  Neck: Supple.   Chest: CTA bilaterally - no wheezing, rales or rhonchi.   Heart: Normal S1 & S2 with RRR.   Abdomen: Soft. Non-tender. Non-distended. + BS  Ext: No pedal edema. No calf tenderness   Neuro: Awake but very lethargic. Moves all four extremities. Speech slow.   Skin: Warm and Dry  Psychiatry: Normal affect    LABS:                        11.8   5.14  )-----------( 263      ( 13 Jul 2019 06:12 )             35.1     07-13    140  |  101  |  8.0  ----------------------------<  155<H>  3.2<L>   |  28.0  |  0.59    Ca    8.6      13 Jul 2019 06:12  Phos  2.3     07-13  Mg     1.6     07-13    Assessment and Plan

## 2019-07-15 ENCOUNTER — TRANSCRIPTION ENCOUNTER (OUTPATIENT)
Age: 84
End: 2019-07-15

## 2019-07-15 VITALS
TEMPERATURE: 98 F | DIASTOLIC BLOOD PRESSURE: 64 MMHG | SYSTOLIC BLOOD PRESSURE: 110 MMHG | HEART RATE: 74 BPM | RESPIRATION RATE: 18 BRPM

## 2019-07-15 LAB
ANION GAP SERPL CALC-SCNC: 9 MMOL/L — SIGNIFICANT CHANGE UP (ref 5–17)
BUN SERPL-MCNC: 15 MG/DL — SIGNIFICANT CHANGE UP (ref 8–20)
CALCIUM SERPL-MCNC: 8.6 MG/DL — SIGNIFICANT CHANGE UP (ref 8.6–10.2)
CHLORIDE SERPL-SCNC: 100 MMOL/L — SIGNIFICANT CHANGE UP (ref 98–107)
CO2 SERPL-SCNC: 29 MMOL/L — SIGNIFICANT CHANGE UP (ref 22–29)
CREAT SERPL-MCNC: 0.83 MG/DL — SIGNIFICANT CHANGE UP (ref 0.5–1.3)
GLUCOSE SERPL-MCNC: 107 MG/DL — SIGNIFICANT CHANGE UP (ref 70–115)
HCT VFR BLD CALC: 31.7 % — LOW (ref 39–50)
HGB BLD-MCNC: 10.7 G/DL — LOW (ref 13–17)
MCHC RBC-ENTMCNC: 30.1 PG — SIGNIFICANT CHANGE UP (ref 27–34)
MCHC RBC-ENTMCNC: 33.8 GM/DL — SIGNIFICANT CHANGE UP (ref 32–36)
MCV RBC AUTO: 89.3 FL — SIGNIFICANT CHANGE UP (ref 80–100)
PLATELET # BLD AUTO: 324 K/UL — SIGNIFICANT CHANGE UP (ref 150–400)
POTASSIUM SERPL-MCNC: 3.3 MMOL/L — LOW (ref 3.5–5.3)
POTASSIUM SERPL-SCNC: 3.3 MMOL/L — LOW (ref 3.5–5.3)
RBC # BLD: 3.55 M/UL — LOW (ref 4.2–5.8)
RBC # FLD: 12.2 % — SIGNIFICANT CHANGE UP (ref 10.3–14.5)
SODIUM SERPL-SCNC: 138 MMOL/L — SIGNIFICANT CHANGE UP (ref 135–145)
WBC # BLD: 6.58 K/UL — SIGNIFICANT CHANGE UP (ref 3.8–10.5)
WBC # FLD AUTO: 6.58 K/UL — SIGNIFICANT CHANGE UP (ref 3.8–10.5)

## 2019-07-15 PROCEDURE — 87449 NOS EACH ORGANISM AG IA: CPT

## 2019-07-15 PROCEDURE — 83735 ASSAY OF MAGNESIUM: CPT

## 2019-07-15 PROCEDURE — 84100 ASSAY OF PHOSPHORUS: CPT

## 2019-07-15 PROCEDURE — 87040 BLOOD CULTURE FOR BACTERIA: CPT

## 2019-07-15 PROCEDURE — 87086 URINE CULTURE/COLONY COUNT: CPT

## 2019-07-15 PROCEDURE — 96365 THER/PROPH/DIAG IV INF INIT: CPT

## 2019-07-15 PROCEDURE — 82962 GLUCOSE BLOOD TEST: CPT

## 2019-07-15 PROCEDURE — 96361 HYDRATE IV INFUSION ADD-ON: CPT

## 2019-07-15 PROCEDURE — 99285 EMERGENCY DEPT VISIT HI MDM: CPT | Mod: 25

## 2019-07-15 PROCEDURE — 92526 ORAL FUNCTION THERAPY: CPT

## 2019-07-15 PROCEDURE — 99239 HOSP IP/OBS DSCHRG MGMT >30: CPT

## 2019-07-15 PROCEDURE — 80053 COMPREHEN METABOLIC PANEL: CPT

## 2019-07-15 PROCEDURE — 81001 URINALYSIS AUTO W/SCOPE: CPT

## 2019-07-15 PROCEDURE — 70450 CT HEAD/BRAIN W/O DYE: CPT

## 2019-07-15 PROCEDURE — 80048 BASIC METABOLIC PNL TOTAL CA: CPT

## 2019-07-15 PROCEDURE — 97163 PT EVAL HIGH COMPLEX 45 MIN: CPT

## 2019-07-15 PROCEDURE — 85027 COMPLETE CBC AUTOMATED: CPT

## 2019-07-15 PROCEDURE — 36415 COLL VENOUS BLD VENIPUNCTURE: CPT

## 2019-07-15 PROCEDURE — 71045 X-RAY EXAM CHEST 1 VIEW: CPT

## 2019-07-15 PROCEDURE — 92610 EVALUATE SWALLOWING FUNCTION: CPT

## 2019-07-15 PROCEDURE — 83605 ASSAY OF LACTIC ACID: CPT

## 2019-07-15 RX ORDER — QUETIAPINE FUMARATE 200 MG/1
1 TABLET, FILM COATED ORAL
Qty: 0 | Refills: 0 | DISCHARGE
Start: 2019-07-15

## 2019-07-15 RX ORDER — IPRATROPIUM/ALBUTEROL SULFATE 18-103MCG
3 AEROSOL WITH ADAPTER (GRAM) INHALATION
Qty: 300 | Refills: 0
Start: 2019-07-15 | End: 2019-07-29

## 2019-07-15 RX ORDER — AMLODIPINE BESYLATE 2.5 MG/1
1 TABLET ORAL
Qty: 30 | Refills: 0
Start: 2019-07-15 | End: 2019-08-13

## 2019-07-15 RX ORDER — SACCHAROMYCES BOULARDII 250 MG
1 POWDER IN PACKET (EA) ORAL
Qty: 0 | Refills: 0 | DISCHARGE
Start: 2019-07-15

## 2019-07-15 RX ORDER — IPRATROPIUM/ALBUTEROL SULFATE 18-103MCG
3 AEROSOL WITH ADAPTER (GRAM) INHALATION
Qty: 0 | Refills: 0 | DISCHARGE
Start: 2019-07-15

## 2019-07-15 RX ORDER — SACCHAROMYCES BOULARDII 250 MG
1 POWDER IN PACKET (EA) ORAL
Qty: 14 | Refills: 0
Start: 2019-07-15 | End: 2019-07-21

## 2019-07-15 RX ORDER — AMLODIPINE BESYLATE 2.5 MG/1
1 TABLET ORAL
Qty: 0 | Refills: 0 | DISCHARGE
Start: 2019-07-15

## 2019-07-15 RX ADMIN — PANTOPRAZOLE SODIUM 40 MILLIGRAM(S): 20 TABLET, DELAYED RELEASE ORAL at 05:41

## 2019-07-15 RX ADMIN — Medication 250 MILLIGRAM(S): at 05:41

## 2019-07-15 RX ADMIN — Medication 1 TABLET(S): at 08:23

## 2019-07-15 RX ADMIN — Medication 81 MILLIGRAM(S): at 12:28

## 2019-07-15 RX ADMIN — Medication 1 TABLET(S): at 12:28

## 2019-07-15 RX ADMIN — MEMANTINE HYDROCHLORIDE 10 MILLIGRAM(S): 10 TABLET ORAL at 05:41

## 2019-07-15 RX ADMIN — AZITHROMYCIN 255 MILLIGRAM(S): 500 TABLET, FILM COATED ORAL at 04:00

## 2019-07-15 RX ADMIN — ESCITALOPRAM OXALATE 20 MILLIGRAM(S): 10 TABLET, FILM COATED ORAL at 12:28

## 2019-07-15 RX ADMIN — SODIUM CHLORIDE 3 MILLILITER(S): 9 INJECTION INTRAMUSCULAR; INTRAVENOUS; SUBCUTANEOUS at 06:00

## 2019-07-15 RX ADMIN — AMLODIPINE BESYLATE 5 MILLIGRAM(S): 2.5 TABLET ORAL at 05:41

## 2019-07-15 RX ADMIN — ENOXAPARIN SODIUM 40 MILLIGRAM(S): 100 INJECTION SUBCUTANEOUS at 12:29

## 2019-07-15 RX ADMIN — SODIUM CHLORIDE 3 MILLILITER(S): 9 INJECTION INTRAMUSCULAR; INTRAVENOUS; SUBCUTANEOUS at 14:18

## 2019-07-15 NOTE — PHYSICAL THERAPY INITIAL EVALUATION ADULT - DISCHARGE DISPOSITION, PT EVAL
return to assisted living with home PT if able to accept back at present mobility level. if not, then pt may benefit from [facility].

## 2019-07-15 NOTE — DISCHARGE NOTE PROVIDER - HOSPITAL COURSE
admitted with pneumonia, encephalopathy, hyponatremia. all resolved with treatment.    noted : Vertebrobasilar dolichoectasia    - Neurosurgery Consult appreciated    - Discussed with family - Roel Daly (Son), does not want further work up        Medically stable and agreeable with discharge and follow up plan. Advised to return to ED if any symptoms occur or worsen.        Vital Signs Last 24 Hrs    T(C): 36.6 (07-15-19 @ 08:04), Max: 36.8 (07-14-19 @ 16:45)    T(F): 97.9 (07-15-19 @ 08:04), Max: 98.3 (07-14-19 @ 23:38)    HR: 60 (07-15-19 @ 08:04) (60 - 72)    BP: 127/71 (07-15-19 @ 08:04) (116/69 - 145/69)    BP(mean): --    RR: 18 (07-15-19 @ 08:04) (18 - 18)    SpO2: 97% (07-15-19 @ 08:04) (95% - 98%)    General: Elderly male lying in bed comfortably. No acute distress    HEENT: PERRLA. EOMI. Clear conjunctivae. Moist mucus membrane    Neck: Supple.     Chest: CTA bilaterally - no wheezing, rales or rhonchi.     Heart: Normal S1 & S2 with RRR.     Abdomen: Soft. Non-tender. Non-distended. + BS    Ext: No pedal edema. No calf tenderness     Neuro: Awake but very lethargic. Moves all four extremities. Speech slow.     Skin: Warm and Dry    Psychiatry: Normal affect        TIME 41 mins admitted with pneumonia, encephalopathy, hyponatremia. all resolved with treatment.    noted : Vertebrobasilar dolichoectasia    - Neurosurgery Consult appreciated    - Discussed with family - Roel Daly (Son), does not want further work up        Medically stable and agreeable with discharge and follow up plan. Advised to return to ED if any symptoms occur or worsen.        Vital Signs Last 24 Hrs    T(C): 36.6 (07-15-19 @ 08:04), Max: 36.8 (07-14-19 @ 16:45)    T(F): 97.9 (07-15-19 @ 08:04), Max: 98.3 (07-14-19 @ 23:38)    HR: 60 (07-15-19 @ 08:04) (60 - 72)    BP: 127/71 (07-15-19 @ 08:04) (116/69 - 145/69)    BP(mean): --    RR: 18 (07-15-19 @ 08:04) (18 - 18)    SpO2: 97% (07-15-19 @ 08:04) (95% - 98%)    General: Elderly male lying in bed comfortably. No acute distress    HEENT: PERRLA. EOMI. Clear conjunctivae. Moist mucus membrane    Neck: Supple.     Chest: CTA bilaterally - no wheezing, rales or rhonchi.     Heart: Normal S1 & S2 with RRR.     Abdomen: Soft. Non-tender. Non-distended. + BS    Ext: No pedal edema. No calf tenderness     Neuro: Awake cooperative minimal verbal responses Moves all four extremities. Speech slow.     Skin: Warm and Dry    Psychiatry: Normal affect        TIME 41 mins

## 2019-07-15 NOTE — PHYSICAL THERAPY INITIAL EVALUATION ADULT - LEVEL OF INDEPENDENCE: STAND/SIT, REHAB EVAL
minimum assist (75% patients effort) no back pain, no gout, no musculoskeletal pain, no neck pain, and no weakness.

## 2019-07-15 NOTE — DISCHARGE NOTE PROVIDER - INSTRUCTIONS
swallow eval done- pt does not have dysphagia. however he has poor cognition and pockets food and pills in his mouth.  must crush PO meds and soft diet is recommended.

## 2019-07-15 NOTE — DISCHARGE NOTE NURSING/CASE MANAGEMENT/SOCIAL WORK - NSDCDPATPORTLINK_GEN_ALL_CORE
You can access the [facility], offered by [facility], by registering with the following website: [email or web address]

## 2019-07-15 NOTE — PHYSICAL THERAPY INITIAL EVALUATION ADULT - MANUAL MUSCLE TESTING RESULTS, REHAB EVAL
right shoulder flex 4+/5, elbow flex 4+/5, hip flex 4/5, knee ext 4+/5, ankle df 4+/5...left shoulder flex 4/5, elbow flex 4/5, hip flex 4/5, knee ext 4/5, ankle df 4+/5

## 2019-07-15 NOTE — PHYSICAL THERAPY INITIAL EVALUATION ADULT - ADDITIONAL COMMENTS
pt mostly non-verbal during eval. per chart, pt lives at the [facility] living, where he usually walks with a walker. no further information regarding prior level of function is available at this time.

## 2019-07-15 NOTE — PHYSICAL THERAPY INITIAL EVALUATION ADULT - CRITERIA FOR SKILLED THERAPEUTIC INTERVENTIONS
risk reduction/prevention/therapy frequency/anticipated discharge recommendation/functional limitations in following categories/rehab potential/anticipated equipment needs at discharge/impairments found

## 2019-07-15 NOTE — DISCHARGE NOTE PROVIDER - NSDCCPCAREPLAN_GEN_ALL_CORE_FT
PRINCIPAL DISCHARGE DIAGNOSIS  Diagnosis: Encephalopathy acute  Assessment and Plan of Treatment:       SECONDARY DISCHARGE DIAGNOSES  Diagnosis: Hyponatremia  Assessment and Plan of Treatment:     Diagnosis: CAP (community acquired pneumonia)  Assessment and Plan of Treatment: CAP (community acquired pneumonia)

## 2019-07-16 LAB
CULTURE RESULTS: SIGNIFICANT CHANGE UP
CULTURE RESULTS: SIGNIFICANT CHANGE UP
SPECIMEN SOURCE: SIGNIFICANT CHANGE UP
SPECIMEN SOURCE: SIGNIFICANT CHANGE UP

## 2019-08-22 ENCOUNTER — INPATIENT (INPATIENT)
Facility: HOSPITAL | Age: 84
LOS: 5 days | Discharge: NURSING, REHAB OR LONG-TERM CARE | DRG: 640 | End: 2019-08-28
Attending: INTERNAL MEDICINE | Admitting: HOSPITALIST
Payer: MEDICARE

## 2019-08-22 VITALS
SYSTOLIC BLOOD PRESSURE: 119 MMHG | DIASTOLIC BLOOD PRESSURE: 65 MMHG | HEIGHT: 69 IN | RESPIRATION RATE: 18 BRPM | TEMPERATURE: 98 F | WEIGHT: 188.05 LBS | OXYGEN SATURATION: 96 % | HEART RATE: 54 BPM

## 2019-08-22 DIAGNOSIS — Z98.42 CATARACT EXTRACTION STATUS, LEFT EYE: Chronic | ICD-10-CM

## 2019-08-22 DIAGNOSIS — Z98.41 CATARACT EXTRACTION STATUS, RIGHT EYE: Chronic | ICD-10-CM

## 2019-08-22 DIAGNOSIS — R62.7 ADULT FAILURE TO THRIVE: ICD-10-CM

## 2019-08-22 LAB
ALBUMIN SERPL ELPH-MCNC: 2.7 G/DL — LOW (ref 3.3–5.2)
ALP SERPL-CCNC: 120 U/L — SIGNIFICANT CHANGE UP (ref 40–120)
ALT FLD-CCNC: 19 U/L — SIGNIFICANT CHANGE UP
ANION GAP SERPL CALC-SCNC: 9 MMOL/L — SIGNIFICANT CHANGE UP (ref 5–17)
ANISOCYTOSIS BLD QL: SLIGHT — SIGNIFICANT CHANGE UP
APPEARANCE UR: CLEAR — SIGNIFICANT CHANGE UP
AST SERPL-CCNC: 31 U/L — SIGNIFICANT CHANGE UP
BACTERIA # UR AUTO: ABNORMAL
BASOPHILS # BLD AUTO: 0.04 K/UL — SIGNIFICANT CHANGE UP (ref 0–0.2)
BASOPHILS NFR BLD AUTO: 0.9 % — SIGNIFICANT CHANGE UP (ref 0–2)
BILIRUB SERPL-MCNC: 1.2 MG/DL — SIGNIFICANT CHANGE UP (ref 0.4–2)
BILIRUB UR-MCNC: NEGATIVE — SIGNIFICANT CHANGE UP
BUN SERPL-MCNC: 10 MG/DL — SIGNIFICANT CHANGE UP (ref 8–20)
CALCIUM SERPL-MCNC: 8.7 MG/DL — SIGNIFICANT CHANGE UP (ref 8.6–10.2)
CHLORIDE SERPL-SCNC: 103 MMOL/L — SIGNIFICANT CHANGE UP (ref 98–107)
CO2 SERPL-SCNC: 27 MMOL/L — SIGNIFICANT CHANGE UP (ref 22–29)
COLOR SPEC: YELLOW — SIGNIFICANT CHANGE UP
CREAT SERPL-MCNC: 0.64 MG/DL — SIGNIFICANT CHANGE UP (ref 0.5–1.3)
DIFF PNL FLD: ABNORMAL
ELLIPTOCYTES BLD QL SMEAR: SLIGHT — SIGNIFICANT CHANGE UP
EOSINOPHIL # BLD AUTO: 0.11 K/UL — SIGNIFICANT CHANGE UP (ref 0–0.5)
EOSINOPHIL NFR BLD AUTO: 2.6 % — SIGNIFICANT CHANGE UP (ref 0–6)
EPI CELLS # UR: SIGNIFICANT CHANGE UP
GIANT PLATELETS BLD QL SMEAR: PRESENT — SIGNIFICANT CHANGE UP
GLUCOSE SERPL-MCNC: 92 MG/DL — SIGNIFICANT CHANGE UP (ref 70–115)
GLUCOSE UR QL: NEGATIVE MG/DL — SIGNIFICANT CHANGE UP
HCT VFR BLD CALC: 33.7 % — LOW (ref 39–50)
HGB BLD-MCNC: 10.9 G/DL — LOW (ref 13–17)
KETONES UR-MCNC: NEGATIVE — SIGNIFICANT CHANGE UP
LEUKOCYTE ESTERASE UR-ACNC: NEGATIVE — SIGNIFICANT CHANGE UP
LYMPHOCYTES # BLD AUTO: 0.07 K/UL — LOW (ref 1–3.3)
LYMPHOCYTES # BLD AUTO: 1.7 % — LOW (ref 13–44)
MACROCYTES BLD QL: SLIGHT — SIGNIFICANT CHANGE UP
MAGNESIUM SERPL-MCNC: 2 MG/DL — SIGNIFICANT CHANGE UP (ref 1.6–2.6)
MANUAL SMEAR VERIFICATION: SIGNIFICANT CHANGE UP
MCHC RBC-ENTMCNC: 29.1 PG — SIGNIFICANT CHANGE UP (ref 27–34)
MCHC RBC-ENTMCNC: 32.3 GM/DL — SIGNIFICANT CHANGE UP (ref 32–36)
MCV RBC AUTO: 90.1 FL — SIGNIFICANT CHANGE UP (ref 80–100)
MICROCYTES BLD QL: SLIGHT — SIGNIFICANT CHANGE UP
MONOCYTES # BLD AUTO: 0.37 K/UL — SIGNIFICANT CHANGE UP (ref 0–0.9)
MONOCYTES NFR BLD AUTO: 8.7 % — SIGNIFICANT CHANGE UP (ref 2–14)
NEUTROPHILS # BLD AUTO: 3.6 K/UL — SIGNIFICANT CHANGE UP (ref 1.8–7.4)
NEUTROPHILS NFR BLD AUTO: 85.2 % — HIGH (ref 43–77)
NITRITE UR-MCNC: NEGATIVE — SIGNIFICANT CHANGE UP
OVALOCYTES BLD QL SMEAR: SLIGHT — SIGNIFICANT CHANGE UP
PH UR: 7 — SIGNIFICANT CHANGE UP (ref 5–8)
PHOSPHATE SERPL-MCNC: 2.9 MG/DL — SIGNIFICANT CHANGE UP (ref 2.4–4.7)
PLAT MORPH BLD: ABNORMAL
PLATELET # BLD AUTO: 223 K/UL — SIGNIFICANT CHANGE UP (ref 150–400)
PLATELET COUNT - ESTIMATE: NORMAL — SIGNIFICANT CHANGE UP
POIKILOCYTOSIS BLD QL AUTO: SLIGHT — SIGNIFICANT CHANGE UP
POTASSIUM SERPL-MCNC: 4.5 MMOL/L — SIGNIFICANT CHANGE UP (ref 3.5–5.3)
POTASSIUM SERPL-SCNC: 4.5 MMOL/L — SIGNIFICANT CHANGE UP (ref 3.5–5.3)
PROT SERPL-MCNC: 5.7 G/DL — LOW (ref 6.6–8.7)
PROT UR-MCNC: NEGATIVE MG/DL — SIGNIFICANT CHANGE UP
RBC # BLD: 3.74 M/UL — LOW (ref 4.2–5.8)
RBC # FLD: 13.6 % — SIGNIFICANT CHANGE UP (ref 10.3–14.5)
RBC BLD AUTO: ABNORMAL
RBC CASTS # UR COMP ASSIST: ABNORMAL /HPF (ref 0–4)
SMUDGE CELLS # BLD: PRESENT — SIGNIFICANT CHANGE UP
SODIUM SERPL-SCNC: 139 MMOL/L — SIGNIFICANT CHANGE UP (ref 135–145)
SP GR SPEC: 1.01 — SIGNIFICANT CHANGE UP (ref 1.01–1.02)
UROBILINOGEN FLD QL: NEGATIVE MG/DL — SIGNIFICANT CHANGE UP
VARIANT LYMPHS # BLD: 0.9 % — SIGNIFICANT CHANGE UP (ref 0–6)
WBC # BLD: 4.22 K/UL — SIGNIFICANT CHANGE UP (ref 3.8–10.5)
WBC # FLD AUTO: 4.22 K/UL — SIGNIFICANT CHANGE UP (ref 3.8–10.5)
WBC UR QL: SIGNIFICANT CHANGE UP

## 2019-08-22 PROCEDURE — 99223 1ST HOSP IP/OBS HIGH 75: CPT | Mod: AI

## 2019-08-22 PROCEDURE — 99285 EMERGENCY DEPT VISIT HI MDM: CPT

## 2019-08-22 PROCEDURE — 71045 X-RAY EXAM CHEST 1 VIEW: CPT | Mod: 26

## 2019-08-22 PROCEDURE — 70450 CT HEAD/BRAIN W/O DYE: CPT | Mod: 26

## 2019-08-22 RX ORDER — HYDRALAZINE HCL 50 MG
10 TABLET ORAL EVERY 6 HOURS
Refills: 0 | Status: DISCONTINUED | OUTPATIENT
Start: 2019-08-22 | End: 2019-08-28

## 2019-08-22 RX ORDER — IPRATROPIUM/ALBUTEROL SULFATE 18-103MCG
3 AEROSOL WITH ADAPTER (GRAM) INHALATION EVERY 6 HOURS
Refills: 0 | Status: DISCONTINUED | OUTPATIENT
Start: 2019-08-22 | End: 2019-08-28

## 2019-08-22 RX ORDER — SODIUM CHLORIDE 9 MG/ML
1000 INJECTION INTRAMUSCULAR; INTRAVENOUS; SUBCUTANEOUS
Refills: 0 | Status: DISCONTINUED | OUTPATIENT
Start: 2019-08-22 | End: 2019-08-24

## 2019-08-22 RX ORDER — MEMANTINE HYDROCHLORIDE 10 MG/1
1 TABLET ORAL
Qty: 0 | Refills: 0 | DISCHARGE

## 2019-08-22 RX ORDER — DRONABINOL 2.5 MG
2.5 CAPSULE ORAL DAILY
Refills: 0 | Status: DISCONTINUED | OUTPATIENT
Start: 2019-08-22 | End: 2019-08-28

## 2019-08-22 RX ORDER — ENOXAPARIN SODIUM 100 MG/ML
40 INJECTION SUBCUTANEOUS DAILY
Refills: 0 | Status: DISCONTINUED | OUTPATIENT
Start: 2019-08-23 | End: 2019-08-28

## 2019-08-22 RX ORDER — ASPIRIN/CALCIUM CARB/MAGNESIUM 324 MG
1 TABLET ORAL
Qty: 0 | Refills: 0 | DISCHARGE

## 2019-08-22 RX ORDER — ASPIRIN/CALCIUM CARB/MAGNESIUM 324 MG
325 TABLET ORAL DAILY
Refills: 0 | Status: DISCONTINUED | OUTPATIENT
Start: 2019-08-22 | End: 2019-08-28

## 2019-08-22 RX ORDER — PANTOPRAZOLE SODIUM 20 MG/1
40 TABLET, DELAYED RELEASE ORAL
Refills: 0 | Status: DISCONTINUED | OUTPATIENT
Start: 2019-08-22 | End: 2019-08-28

## 2019-08-22 RX ORDER — MEMANTINE HYDROCHLORIDE 10 MG/1
10 TABLET ORAL DAILY
Refills: 0 | Status: DISCONTINUED | OUTPATIENT
Start: 2019-08-22 | End: 2019-08-28

## 2019-08-22 RX ORDER — ATORVASTATIN CALCIUM 80 MG/1
20 TABLET, FILM COATED ORAL AT BEDTIME
Refills: 0 | Status: DISCONTINUED | OUTPATIENT
Start: 2019-08-22 | End: 2019-08-28

## 2019-08-22 RX ORDER — ACETAMINOPHEN 500 MG
650 TABLET ORAL EVERY 6 HOURS
Refills: 0 | Status: DISCONTINUED | OUTPATIENT
Start: 2019-08-22 | End: 2019-08-28

## 2019-08-22 RX ORDER — LOPERAMIDE HCL 2 MG
2 TABLET ORAL THREE TIMES A DAY
Refills: 0 | Status: DISCONTINUED | OUTPATIENT
Start: 2019-08-22 | End: 2019-08-28

## 2019-08-22 RX ORDER — SODIUM CHLORIDE 9 MG/ML
1000 INJECTION INTRAMUSCULAR; INTRAVENOUS; SUBCUTANEOUS ONCE
Refills: 0 | Status: COMPLETED | OUTPATIENT
Start: 2019-08-22 | End: 2019-08-22

## 2019-08-22 RX ORDER — ESCITALOPRAM OXALATE 10 MG/1
20 TABLET, FILM COATED ORAL DAILY
Refills: 0 | Status: DISCONTINUED | OUTPATIENT
Start: 2019-08-22 | End: 2019-08-28

## 2019-08-22 RX ORDER — DONEPEZIL HYDROCHLORIDE 10 MG/1
10 TABLET, FILM COATED ORAL AT BEDTIME
Refills: 0 | Status: DISCONTINUED | OUTPATIENT
Start: 2019-08-22 | End: 2019-08-28

## 2019-08-22 RX ORDER — QUETIAPINE FUMARATE 200 MG/1
25 TABLET, FILM COATED ORAL AT BEDTIME
Refills: 0 | Status: DISCONTINUED | OUTPATIENT
Start: 2019-08-22 | End: 2019-08-28

## 2019-08-22 RX ADMIN — SODIUM CHLORIDE 100 MILLILITER(S): 9 INJECTION INTRAMUSCULAR; INTRAVENOUS; SUBCUTANEOUS at 19:56

## 2019-08-22 RX ADMIN — Medication 2 MILLIGRAM(S): at 23:03

## 2019-08-22 RX ADMIN — SODIUM CHLORIDE 1000 MILLILITER(S): 9 INJECTION INTRAMUSCULAR; INTRAVENOUS; SUBCUTANEOUS at 14:36

## 2019-08-22 RX ADMIN — ATORVASTATIN CALCIUM 20 MILLIGRAM(S): 80 TABLET, FILM COATED ORAL at 23:03

## 2019-08-22 RX ADMIN — QUETIAPINE FUMARATE 25 MILLIGRAM(S): 200 TABLET, FILM COATED ORAL at 23:03

## 2019-08-22 RX ADMIN — SODIUM CHLORIDE 1000 MILLILITER(S): 9 INJECTION INTRAMUSCULAR; INTRAVENOUS; SUBCUTANEOUS at 15:36

## 2019-08-22 NOTE — H&P ADULT - NSHPLABSRESULTS_GEN_ALL_CORE
LABS:                        10.9   4.22  )-----------( 223      ( 22 Aug 2019 14:13 )             33.7     -    139  |  103  |  10.0  ----------------------------<  92  4.5   |  27.0  |  0.64    Ca    8.7      22 Aug 2019 14:13  Phos  2.9       Mg     2.0         TPro  5.7<L>  /  Alb  2.7<L>  /  TBili  1.2  /  DBili  x   /  AST  31  /  ALT  19  /  AlkPhos  120          LIVER FUNCTIONS - ( 22 Aug 2019 14:13 )  Alb: 2.7 g/dL / Pro: 5.7 g/dL / ALK PHOS: 120 U/L / ALT: 19 U/L / AST: 31 U/L / GGT: x           Urinalysis Basic - ( 22 Aug 2019 16:26 )    Color: Yellow / Appearance: Clear / S.010 / pH: x  Gluc: x / Ketone: Negative  / Bili: Negative / Urobili: Negative mg/dL   Blood: x / Protein: Negative mg/dL / Nitrite: Negative   Leuk Esterase: Negative / RBC: 11-25 /HPF / WBC 0-2   Sq Epi: x / Non Sq Epi: Occasional / Bacteria: Occasional

## 2019-08-22 NOTE — ED PROVIDER NOTE - PROGRESS NOTE DETAILS
Sejal: spoke to pt son Roel, him or brother will be coming in. pt with labs/urine/cth winl. with likely metastatic disease which roel is aware of. pt not taking any po. family unsure if wants feeding tube vs hospice. admitted to Dr. Paige as cant go back to NH at this point without being able to take any po if not hospice/feeding tube.

## 2019-08-22 NOTE — ED PROVIDER NOTE - OBJECTIVE STATEMENT
87 y/o male hx htn, hld, colon/prostate cancer, cva, dementia sent from [facility] for refusing to eat/drink/take medications for 3 days. Pt in dementia unit, with hx of behavioral distubances, severe dementia, per manager (spoke on phone)- pt often with eyes closed. No other acute abnormalities at NH, no vomting/diarrhea/coughing. Pt following commands and looking at me on exam but not speaking or providing history other than shaking head no when asked if has pain.    limited ros dementia.

## 2019-08-22 NOTE — ED PROVIDER NOTE - CLINICAL SUMMARY MEDICAL DECISION MAKING FREE TEXT BOX
pt with hx dementia/behavior issues, refusing to eat/failure to thrive from dementia unit at [facility]. slightly dehydrated appearing, afebrile with other vitals wnl. called son's, no answer. will hydrate, ct head, check urine/cxr, reassess.

## 2019-08-22 NOTE — ED ADULT NURSE NOTE - CHIEF COMPLAINT QUOTE
Pt BIBA from the [facility], arouseable to verbal and tactile stimuli, per ems pt with failure to thrive for the past few days. States pt is not taking any of his medications or eating/drinking. Pt denies any complaints. EMS reports pt is combative at baseline. Calm and cooperative in triage.

## 2019-08-22 NOTE — ED ADULT TRIAGE NOTE - CHIEF COMPLAINT QUOTE
Pt BIBA from the [facility], arouseable to verbal and tactile stimuli, per ems pt with failure to thrive for the past few days. States pt is not taking any of his medications or eating/drinking. Pt denies any complaints. EMS reports pt is combative at baseline. Calm and cooperative in triage.

## 2019-08-22 NOTE — ED ADULT NURSE REASSESSMENT NOTE - NS ED NURSE REASSESS COMMENT FT1
Pt transported to CDU 13R, handoff report given to BARI Castellano.  Meal provided.  Plan of care discussed with pt and family.

## 2019-08-22 NOTE — H&P ADULT - HISTORY OF PRESENT ILLNESS
Pt seen/ examined.   Admitted for Failure to thrive with poor PO intake.     FULL NOTE PENDING!! Pt with dementia and unable to provide meaningful history, history obtained from ED staff/ charts / pt daughter-in-law over the phone. Pt is 89 y/o male with PMHx of HTN, HLD, CAD, GERD, Dementia, remote hx of prostate cancer, colon cancer 2 years ago s/p chemoradiation, was sent here from NH for PO oral intake. Per reports Pt was not eating or drinking for last few days and was sent in here for failure to thrive. ROS very limited given dementia though Pt is awake and offers no complaints. Off note- Per daughter in law Nirmala Pt is declining in health over the last few months with Poor PO intake and failure to thrive and also dementia worsening.

## 2019-08-22 NOTE — ED PROVIDER NOTE - PHYSICAL EXAMINATION
Gen: No acute distress, non toxic  HEENT: slightly dry mucus membranes pink conjunctivae, EOMI  CV: RRR, nl s1/s2.  Resp: CTAB, normal rate and effort  GI: Abdomen soft, NT, ND. No rebound, no guarding  : No CVAT  Neuro: eyes open, follows commands, nl strength b/l UE and LE. no facial droop. not speaking.   MSK: No spine or joint tenderness to palpation  Skin: No rashes. intact and perfused.

## 2019-08-22 NOTE — H&P ADULT - NSHPPHYSICALEXAM_GEN_ALL_CORE
PHYSICAL EXAM:    Vital Signs Last 24 Hrs  T(C): 36.5 (22 Aug 2019 12:36), Max: 36.5 (22 Aug 2019 12:36)  T(F): 97.7 (22 Aug 2019 12:36), Max: 97.7 (22 Aug 2019 12:36)  HR: 54 (22 Aug 2019 12:36) (54 - 54)  BP: 119/65 (22 Aug 2019 12:36) (119/65 - 119/65)  BP(mean): --  RR: 18 (22 Aug 2019 12:36) (18 - 18)  SpO2: 96% (22 Aug 2019 12:36) (96% - 96%)    GENERAL: Pt lying comfortably, NAD.  ENMT: PERRL, +EOMI.  NECK: soft, Supple, No JVD,   CHEST/LUNG: Clear to auscultation bilaterally; No wheezing.  HEART: S1S2+, Regular rate and rhythm; No murmurs.  ABDOMEN: Soft, Nontender, Nondistended; Bowel sounds present.  MUSCULOSKELETAL: Normal range of motion.  SKIN: No rashes or lesions.  NEURO: AAOX1, following simple commands, no focal deficits, no motor r sensory loss.  PSYCH: normal mood.

## 2019-08-22 NOTE — ED ADULT NURSE NOTE - OBJECTIVE STATEMENT
Assumed pt care at 1320.  Pt awake, alert, nonverbal but responsive to verbal stimuli, following commands.  Pt sent in from the [facility] for failure to thrive and dehydration.  Pt unable to answer questions/ provide hx at this time.  Respirations even and unlabored, denies chest pain.  Abdomen soft, nontender, nondistended.  Skin warm and dry.  Moving all extremities well and with purpose.

## 2019-08-22 NOTE — ED ADULT NURSE NOTE - INTERVENTIONS DEFINITIONS
[place] to call system/Non-slip footwear when patient is off stretcher/Physically safe environment: no spills, clutter or unnecessary equipment/Provide visual cue, wrist band, yellow gown, etc./Room bathroom lighting operational/Call bell, personal items and telephone within reach/Stretcher in lowest position, wheels locked, appropriate side rails in place

## 2019-08-23 LAB
CULTURE RESULTS: NO GROWTH — SIGNIFICANT CHANGE UP
SPECIMEN SOURCE: SIGNIFICANT CHANGE UP

## 2019-08-23 PROCEDURE — 99223 1ST HOSP IP/OBS HIGH 75: CPT

## 2019-08-23 PROCEDURE — 99233 SBSQ HOSP IP/OBS HIGH 50: CPT

## 2019-08-23 RX ADMIN — Medication 325 MILLIGRAM(S): at 12:48

## 2019-08-23 RX ADMIN — SODIUM CHLORIDE 100 MILLILITER(S): 9 INJECTION INTRAMUSCULAR; INTRAVENOUS; SUBCUTANEOUS at 18:41

## 2019-08-23 RX ADMIN — Medication 2.5 MILLIGRAM(S): at 12:47

## 2019-08-23 RX ADMIN — QUETIAPINE FUMARATE 25 MILLIGRAM(S): 200 TABLET, FILM COATED ORAL at 22:15

## 2019-08-23 RX ADMIN — Medication 2 MILLIGRAM(S): at 22:15

## 2019-08-23 RX ADMIN — ENOXAPARIN SODIUM 40 MILLIGRAM(S): 100 INJECTION SUBCUTANEOUS at 12:49

## 2019-08-23 RX ADMIN — Medication 2 MILLIGRAM(S): at 05:56

## 2019-08-23 RX ADMIN — MEMANTINE HYDROCHLORIDE 10 MILLIGRAM(S): 10 TABLET ORAL at 12:47

## 2019-08-23 RX ADMIN — SODIUM CHLORIDE 100 MILLILITER(S): 9 INJECTION INTRAMUSCULAR; INTRAVENOUS; SUBCUTANEOUS at 05:56

## 2019-08-23 RX ADMIN — DONEPEZIL HYDROCHLORIDE 10 MILLIGRAM(S): 10 TABLET, FILM COATED ORAL at 22:15

## 2019-08-23 RX ADMIN — ESCITALOPRAM OXALATE 20 MILLIGRAM(S): 10 TABLET, FILM COATED ORAL at 12:47

## 2019-08-23 RX ADMIN — PANTOPRAZOLE SODIUM 40 MILLIGRAM(S): 20 TABLET, DELAYED RELEASE ORAL at 05:56

## 2019-08-23 RX ADMIN — ATORVASTATIN CALCIUM 20 MILLIGRAM(S): 80 TABLET, FILM COATED ORAL at 22:15

## 2019-08-23 RX ADMIN — Medication 1 TABLET(S): at 12:48

## 2019-08-23 NOTE — GOALS OF CARE CONVERSATION - PERSONAL ADVANCE DIRECTIVE - CONVERSATION DETAILS
Mtg with Pt in ED.  Pt is awake but confused no signs of pain or discomfort.  Son Roel called on phone.  He verbalized that all three sons were the HCP and all on board with decisions for their father.  Their father has been in Assisted Living because since their mother  they have not been able to care for him @ home.  He needed more supervision.  A family member visited almost every day @ the Assisted Living.  Roel requested that all three sons be called to discuss Advanced Directives of DNR/DNI.  Another phone call to their place of work [phone] Teodoro and Sunny both were spoken to and Advanced Directives were reviewed both sons agreed with Roel that they do not wish for their father to be resuscitated or intubated.  They verbalized understanding of a MOLST form being completed and placed in the chart.  A meetilng was also arranged for  with one son who will represent all three sons.  The sons all own their own businesses and are unable to come together in person.  Justin will be present.  Dr. Multani notified of conversation and planned mtg.  Dr. Multani will place the order for DNR.  Palliative will follow as needed.

## 2019-08-23 NOTE — PATIENT PROFILE ADULT - NSPROMUTPARTICIPCAREFT_GEN_A_NUR
MSW received VM from [place] [id number] will not be able to provided elias care. family may participate in care

## 2019-08-23 NOTE — CONSULT NOTE ADULT - ASSESSMENT
88M with h/o advanced dementia, HTN, CAD, remote h/o prostate CA and colon CA, resident of [facility] admitted for failure to thrive. CXR with right lung mass and multiple pulmonary nodules suspicious for metastasis. Palliative consulted for GO.     PLAN    Metastatic Colon Cancer  - dx 2 yrs ago, s/p chemoradiation   - CXR with evidence of new mets to lung as noted above    Advanced Dementia  - progressive clinical deterioration over last several months, needing more assistance with ADLs, anorexia, failure to thrive  - prognosis guarded, on Donepezil, memantine, escitalopram, quetiapine     Debility/Cachexia/Protein Calorie Malnutrition  - noted to have hypoalbuminemia, BMI 19  - overall poor intake per RN  - encourage PO intake, on dronabinol     Palliative Care Encounter  Please see GOC note per Palliative RN Hallie Merino. Advance directives discussed with  all 3 sons, who are HCPs, in agreement that pt would not have wanted heroic life sustaining interventions. Permission granted from sons to complete MOLST for DNR/I.     Family meeting also arranged for Monday 8/26 at 2PM with son Justin. Pending outcome of meeting, if family opts to forego any curative measures and focus more of a comfort measure approach, pt would be a good candidate for hospice. PCT to follow up.

## 2019-08-23 NOTE — PROGRESS NOTE ADULT - ASSESSMENT
Pt is 87 y/o male with PMHx of HTN, HLD, CAD, GERD, Dementia, remote hx of prostate cancer, colon cancer 2 years ago s/p chemoradiation, was sent here from NH for PO oral intake and failure to thrive.  In ED labs grossly intact, CTH with no acute finding, UA negative, CXR suspected for metastatic malignancy. Pt admitted for failure to thrive with underlying worsening dementia and metastatic malignancy. Pt started on IVFs with appetite stimulants and his home meds resumed.      >Failure to thrive  - Probably from underling dementia and metastatic cancer.   - Keep on fall / aspiration precaution, enhanced supervision.   - C/w IVFs, Marinol, encourage PO intake. Nutritionist consult.   - PT evaluation.     >Metastatic colon cancer  - Per daughter-in-law Nirmala Pt had colon cancer 2 years prior, s/p chemoradiation at that time. Now CXR suspected for metastatic malignancy.  - Per daughter-in-law Nirmala-> Family leaning toward not to pursue further work up for his underlying malignancy however pending discussion with Pt's son.   - Palliative care consult requested.       >Comorbidities-Hx of prostate cancer/ HTN / HLD/ CAD/ GERD/ Dementia:  - C/w o/p meds as reconciled.   - Fall/ aspiration precaution, enhanced supervision.   - DVT ppx with Lovenox.

## 2019-08-23 NOTE — CONSULT NOTE ADULT - SUBJECTIVE AND OBJECTIVE BOX
PALLIATIVE CONSULT    CC: Patient is a 88y old  Male who presents with a chief complaint of SNF MD. (23 Aug 2019 08:21)    HPI:  Mr. Daly is an 88 year old male with PMHx of dementia, HTN, HLD, CAD, GERD, remote h/o prostate cancer and colon cancer (dx 2 yrs ago) s/p chemoradiation and resident of [facility] admitted for failure to thrive. Pt is a poor historian and no family present. Info obtained from chart review and hospitalist. Family noted pt has been clinically declining over last several months with anorexia, poor intake and worsening dementia. CXR with right lung mass and bilateral pulmonary nodules concerning for metastatic disease. Palliative consulted for support and GOC.     Pt seen and examined at bedside with medical resident. Pt sleeping comfortably, woke to verbal stimuli. He offered no acute complaints. He is pleasantly confused, remembers eating earlier but does not know what.     Present Symptoms:   Dyspnea:  No   Fatigue:  No "but I could sleep"  Loss of appetite: Yes   Pain: No          Limited ROS due to dementia    PERTINENT PMH REVIEWED: Yes     PAST MEDICAL & SURGICAL HISTORY:  Depression  GERD (gastroesophageal reflux disease)  High cholesterol  Dementia  CVA (cerebral vascular accident)  Colon cancer  Prostate CA  Memory loss or impairment  HTN (hypertension)  S/P cataract extraction and insertion of intraocular lens, left  S/P cataract extraction and insertion of intraocular lens, right      SOCIAL HISTORY:    Admitted from:  [facility]  -   - children: 3 sons, all are HCPs    Baseline ADLs (prior to admission): mostly Dependent   Karnofsky:  30-40%    Surrogate/HCP/Guardian:   - per family, all 3 sons are HCPs  - son Roel [phone]  - son Teodoro [phone]    ADVANCE DIRECTIVES: DNR/I, MOLST completed today 19      FAMILY HISTORY:  FH: HTN (hypertension)       Allergies    No Known Allergies    Intolerances        MEDICATIONS  (STANDING):  aspirin 325 milliGRAM(s) Oral daily  atorvastatin 20 milliGRAM(s) Oral at bedtime  donepezil 10 milliGRAM(s) Oral at bedtime  dronabinol 2.5 milliGRAM(s) Oral daily  enoxaparin Injectable 40 milliGRAM(s) SubCutaneous daily  escitalopram 20 milliGRAM(s) Oral daily  loperamide 2 milliGRAM(s) Oral three times a day  memantine 10 milliGRAM(s) Oral daily  multivitamin 1 Tablet(s) Oral daily  pantoprazole    Tablet 40 milliGRAM(s) Oral before breakfast  QUEtiapine 25 milliGRAM(s) Oral at bedtime  sodium chloride 0.9%. 1000 milliLiter(s) (100 mL/Hr) IV Continuous <Continuous>    MEDICATIONS  (PRN):  acetaminophen   Tablet .. 650 milliGRAM(s) Oral every 6 hours PRN Temp greater or equal to 38C (100.4F), Mild Pain (1 - 3)  ALBUTerol/ipratropium for Nebulization 3 milliLiter(s) Nebulizer every 6 hours PRN Shortness of Breath  hydrALAZINE Injectable 10 milliGRAM(s) IV Push every 6 hours PRN SBP>175      PHYSICAL EXAM:    Vital Signs Last 24 Hrs  T(C): 36.8 (23 Aug 2019 10:58), Max: 36.8 (22 Aug 2019 23:40)  T(F): 98.2 (23 Aug 2019 10:58), Max: 98.3 (23 Aug 2019 07:29)  HR: 60 (23 Aug 2019 10:58) (50 - 60)  BP: 106/64 (23 Aug 2019 10:58) (106/64 - 153/75)  BP(mean): --  RR: 18 (23 Aug 2019 10:58) (16 - 18)  SpO2: 99% (23 Aug 2019 10:58) (96% - 99%)    General: Resting comfortably. No acute distress.   HEENT: MMM, mucous membrane dry.  ETT/trach or NGT  Lungs: clear to auscultation bilaterally. no rales, rhonchi, wheezing. non-labored.   CV: +s1/s2. Regular rate and rhythm.  No murmurs, rubs, gallop  GI:+ bowel sound. abdomen soft, non-tender, non-distended.  MSK: Moves all 4 extremities.  No cyanosis or edema. weakness.   Neuro: Awake and alert, oriented to person/place/time. Interactive.   : suprapubic tenderness to palpation. Ang  Skin: warm and dry. no rash. ecchymosis. wounds.      LABS:                        10.9   4.22  )-----------( 223      ( 22 Aug 2019 14:13 )             33.7     08-    139  |  103  |  10.0  ----------------------------<  92  4.5   |  27.0  |  0.64    Ca    8.7      22 Aug 2019 14:13  Phos  2.9       Mg     2.0         TPro  5.7<L>  /  Alb  2.7<L>  /  TBili  1.2  /  DBili  x   /  AST  31  /  ALT  19  /  AlkPhos  120        Urinalysis Basic - ( 22 Aug 2019 16:26 )    Color: Yellow / Appearance: Clear / S.010 / pH: x  Gluc: x / Ketone: Negative  / Bili: Negative / Urobili: Negative mg/dL   Blood: x / Protein: Negative mg/dL / Nitrite: Negative   Leuk Esterase: Negative / RBC: 11-25 /HPF / WBC 0-2   Sq Epi: x / Non Sq Epi: Occasional / Bacteria: Occasional        RADIOLOGY & ADDITIONAL STUDIES:     CTH 19  FINDINGS:  Chronic infarct in the right basal ganglia/internal capsule. Chronic   lacunar infarcts in the bilateral centrum semiovale and corona radiata.   There is no acute intracranial hemorrhage or mass effect. There are areas   of hypodensity in the bilateral hemispheric white matter suggesting   chronic white matter microvascular ischemic change. There is cerebral   volume loss.    There is no extraaxial fluid collection.     There is no displaced calvarial fracture. Chronic left nasal bone   fracture. Status post bilateral intraocular lens implants. Aerosolized   secretions in the right posterior ethmoid and right sphenoid sinuses. The   mastoid air cells are well aerated.    IMPRESSION: No acute intracranial hemorrhage or mass effect.     CXR 19  FINDINGS:  Increased size of right lower lung mass and bilateral pulmonary nodules,   suspicious for metastatic disease.  No pleural effusions or pneumothorax.  The cardiomediastinal silhouette is within normal limits.  Degenerative changes in the spine. Atherosclerotic changes.    IMPRESSION: Increased size of right lower lung mass and bilateral   pulmonary nodules, suspicious for metastatic disease.        Thank you for the opportunity to assist with the care of this patient.   [place] [facility] Consult Service [phone].

## 2019-08-23 NOTE — PROGRESS NOTE ADULT - SUBJECTIVE AND OBJECTIVE BOX
VEENA BHATT Male 88y [id number]    Patient is a 88y old  Male who presents with a chief complaint of SNF MD. (22 Aug 2019 17:54)      Subjective/objective:  Pt seen and examined at bedside, here for failure to thrive, o over night event reported by night staff. Pt with underling dementia and pleasantly confused, ROS very limited. Pt resting comfortably, per RN very poor PO intake       PHYSICAL EXAM:    Vital Signs Last 24 Hrs  T(C): 36.8 (23 Aug 2019 07:29), Max: 36.8 (22 Aug 2019 23:40)  T(F): 98.3 (23 Aug 2019 07:29), Max: 98.3 (23 Aug 2019 07:29)  HR: 50 (23 Aug 2019 07:29) (50 - 58)  BP: 146/88 (23 Aug 2019 07:29) (119/65 - 153/75)  BP(mean): --  RR: 18 (23 Aug 2019 07:29) (16 - 18)  SpO2: 96% (23 Aug 2019 07:29) (96% - 98%)    GENERAL: Pt lying comfortably, NAD. Elderly male  CHEST/LUNG: Clear to auscultation bilaterally; No wheezing.  HEART: S1S2+, Regular rate and rhythm; No murmurs.  ABDOMEN: Soft, Nontender, Nondistended; Bowel sounds present.  Extremities: No LE edema, pulses+  NEURO: AAOX1, pleasantly confused, moves all 4 extremities.   PSYCH: normal mood.          MEDICATIONS  (STANDING):  aspirin 325 milliGRAM(s) Oral daily  atorvastatin 20 milliGRAM(s) Oral at bedtime  donepezil 10 milliGRAM(s) Oral at bedtime  dronabinol 2.5 milliGRAM(s) Oral daily  enoxaparin Injectable 40 milliGRAM(s) SubCutaneous daily  escitalopram 20 milliGRAM(s) Oral daily  loperamide 2 milliGRAM(s) Oral three times a day  memantine 10 milliGRAM(s) Oral daily  multivitamin 1 Tablet(s) Oral daily  pantoprazole    Tablet 40 milliGRAM(s) Oral before breakfast  QUEtiapine 25 milliGRAM(s) Oral at bedtime  sodium chloride 0.9%. 1000 milliLiter(s) (100 mL/Hr) IV Continuous <Continuous>    MEDICATIONS  (PRN):  acetaminophen   Tablet .. 650 milliGRAM(s) Oral every 6 hours PRN Temp greater or equal to 38C (100.4F), Mild Pain (1 - 3)  ALBUTerol/ipratropium for Nebulization 3 milliLiter(s) Nebulizer every 6 hours PRN Shortness of Breath  hydrALAZINE Injectable 10 milliGRAM(s) IV Push every 6 hours PRN SBP>175        Labs:  LABS:                        10.9   4.22  )-----------( 223      ( 22 Aug 2019 14:13 )             33.7         139  |  103  |  10.0  ----------------------------<  92  4.5   |  27.0  |  0.64    Ca    8.7      22 Aug 2019 14:13  Phos  2.9       Mg     2.0         TPro  5.7<L>  /  Alb  2.7<L>  /  TBili  1.2  /  DBili  x   /  AST  31  /  ALT  19  /  AlkPhos  120          LIVER FUNCTIONS - ( 22 Aug 2019 14:13 )  Alb: 2.7 g/dL / Pro: 5.7 g/dL / ALK PHOS: 120 U/L / ALT: 19 U/L / AST: 31 U/L / GGT: x           Urinalysis Basic - ( 22 Aug 2019 16:26 )    Color: Yellow / Appearance: Clear / S.010 / pH: x  Gluc: x / Ketone: Negative  / Bili: Negative / Urobili: Negative mg/dL   Blood: x / Protein: Negative mg/dL / Nitrite: Negative   Leuk Esterase: Negative / RBC: 11-25 /HPF / WBC 0-2   Sq Epi: x / Non Sq Epi: Occasional / Bacteria: Occasional

## 2019-08-23 NOTE — PATIENT PROFILE ADULT - PUBLIC BENEFITS
Well-differentiated FTCs demonstrating only capsular invasion (without vascular invasion) usually have an excellent prognosis with recurrence rates of 0%-7% and can be classified as low-risk tumors ([phone]). Encapsulated, minimally invasive FTC with only minor vascular invasion (small number of foci confined to intracapsular vessels) also appears to have a low recurrence rate of approximately 0%-5% (456,500).  Furthermore, some studies ([phone]), but not all  (496,501), suggest a greater extent of vascular invasion (more  than four foci of vascular invasion, or extracapsular vascular  invasion) is associated with poorer outcomes even in encapsulated FTCs. However, FTC is considered DEIRDRE high risk  if extensive vascular invasion is present because the risk of  the development of newly identified distant metastases is as  high as 30%-55% (455,494,497,500)    DEIRDRE low risk Papillary thyroid cancer (with all of the following):  No local or distant metastases;  All macroscopic tumor has been resected  No tumor invasion of loco-regional tissues or structures  The tumor does not have aggressive histology (e.g., tall cell, hobnail variant,  columnar cell carcinoma)  If 131I is given, there are no GUTHRIE-avid metastatic foci outside the thyroid bed on  the first posttreatment whole-body GUTHRIE scan  No vascular invasion  Clinical N0 or £5 pathologic N1 micrometastases (<0.2 cm in largest dimension)a  Intrathyroidal, encapsulated follicular variant of papillary thyroid cancera  Intrathyroidal, well differentiated follicular thyroid cancer with capsular invasion and  no or minimal (<4 foci) vascular invasiona  Intrathyroidal, papillary microcarcinoma, unifocal or multifocal, including [id number]  mutated (if known)    Procedure: Left lobectomy  Tumor focality: Unifocal  Tumor site: Left  Tumor size: 1.8 x 1.0 x 0.8 cm  Histologic type: Follicular carcinoma, minimally invasive  Margins:  Uninvolved by carcinoma  Distance of  invasive carcinoma from closest margin: 1 mm  Angio invasion: Not identified  Lymphatic invasion: Not identified  Extrathyroidal extension: Not identified  Regional lymph nodes  Number of lymph nodes involved: 0  Number of lymph nodes examined: 3  Pathologic staging: pT1b N0 MX     I called the patient and shared with her the pathology report. This is a minimally invasive follicular carcinoma with no angioinvasion. I apologized for calling her with the info, but I anticipate that she will want to be prepared with questions for the visit with Guillermina on Thursday. We will present her case at the multidisciplinary endocrine conference next week. She expressed gratitude for the call and the opportunity to research her situation. I will discuss her options (observation versus completion thyroidectomy) with her then.         no

## 2019-08-24 PROCEDURE — 99232 SBSQ HOSP IP/OBS MODERATE 35: CPT

## 2019-08-24 RX ORDER — SODIUM CHLORIDE 9 MG/ML
1000 INJECTION INTRAMUSCULAR; INTRAVENOUS; SUBCUTANEOUS
Refills: 0 | Status: DISCONTINUED | OUTPATIENT
Start: 2019-08-24 | End: 2019-08-25

## 2019-08-24 RX ADMIN — Medication 1 TABLET(S): at 16:04

## 2019-08-24 RX ADMIN — Medication 325 MILLIGRAM(S): at 12:52

## 2019-08-24 RX ADMIN — DONEPEZIL HYDROCHLORIDE 10 MILLIGRAM(S): 10 TABLET, FILM COATED ORAL at 22:16

## 2019-08-24 RX ADMIN — Medication 2.5 MILLIGRAM(S): at 12:52

## 2019-08-24 RX ADMIN — Medication 2 MILLIGRAM(S): at 22:16

## 2019-08-24 RX ADMIN — MEMANTINE HYDROCHLORIDE 10 MILLIGRAM(S): 10 TABLET ORAL at 16:03

## 2019-08-24 RX ADMIN — ENOXAPARIN SODIUM 40 MILLIGRAM(S): 100 INJECTION SUBCUTANEOUS at 12:52

## 2019-08-24 RX ADMIN — SODIUM CHLORIDE 75 MILLILITER(S): 9 INJECTION INTRAMUSCULAR; INTRAVENOUS; SUBCUTANEOUS at 12:48

## 2019-08-24 RX ADMIN — QUETIAPINE FUMARATE 25 MILLIGRAM(S): 200 TABLET, FILM COATED ORAL at 22:16

## 2019-08-24 RX ADMIN — ATORVASTATIN CALCIUM 20 MILLIGRAM(S): 80 TABLET, FILM COATED ORAL at 22:16

## 2019-08-24 RX ADMIN — ESCITALOPRAM OXALATE 20 MILLIGRAM(S): 10 TABLET, FILM COATED ORAL at 16:03

## 2019-08-24 RX ADMIN — Medication 2 MILLIGRAM(S): at 05:57

## 2019-08-24 RX ADMIN — PANTOPRAZOLE SODIUM 40 MILLIGRAM(S): 20 TABLET, DELAYED RELEASE ORAL at 05:58

## 2019-08-24 NOTE — SWALLOW BEDSIDE ASSESSMENT ADULT - ASR SWALLOW ASPIRATION MONITOR
oral hygiene/fever/pneumonia/gurgly voice/upper respiratory infection/change of breathing pattern/position upright (90Y)/cough/throat clearing

## 2019-08-24 NOTE — PROGRESS NOTE ADULT - SUBJECTIVE AND OBJECTIVE BOX
Internal Medicine Hospitalist - Dr. Jevon BHATT    [id number]    88y      Male    Patient is a 88y old  Male who presents with a chief complaint of SNF MD. (23 Aug 2019 15:14)    INTERVAL HPI/ OVERNIGHT EVENTS: Patient is seen and examined, poor appetite, denied abd. pain, nausea and vomiting    REVIEW OF SYSTEMS:    Denied fever, chills, abd. pain, nausea, vomiting, chest pain, SOB, headache, dizziness    PHYSICAL EXAM:    Vital Signs Last 24 Hrs  T(C): 36.7 (24 Aug 2019 10:48), Max: 36.9 (23 Aug 2019 18:41)  T(F): 98 (24 Aug 2019 10:48), Max: 98.4 (23 Aug 2019 18:41)  HR: 65 (24 Aug 2019 10:48) (59 - 65)  BP: 150/85 (24 Aug 2019 10:48) (129/66 - 150/85)  BP(mean): --  RR: 19 (24 Aug 2019 10:48) (18 - 22)  SpO2: 97% (24 Aug 2019 10:48) (97% - 98%)    GENERAL: NAD  CHEST/LUNG: CTA b/l   HEART: S1S2+ audible  ABDOMEN: Soft, Nontender, Nondistended; Bowel sounds present  EXTREMITIES:  no edema  CNS: Awake  Psychiatry: normal mood    LABS:            Urinalysis Basic - ( 22 Aug 2019 16:26 )    Color: Yellow / Appearance: Clear / S.010 / pH: x  Gluc: x / Ketone: Negative  / Bili: Negative / Urobili: Negative mg/dL   Blood: x / Protein: Negative mg/dL / Nitrite: Negative   Leuk Esterase: Negative / RBC: 11-25 /HPF / WBC 0-2   Sq Epi: x / Non Sq Epi: Occasional / Bacteria: Occasional          MEDICATIONS  (STANDING):  aspirin 325 milliGRAM(s) Oral daily  atorvastatin 20 milliGRAM(s) Oral at bedtime  donepezil 10 milliGRAM(s) Oral at bedtime  dronabinol 2.5 milliGRAM(s) Oral daily  enoxaparin Injectable 40 milliGRAM(s) SubCutaneous daily  escitalopram 20 milliGRAM(s) Oral daily  loperamide 2 milliGRAM(s) Oral three times a day  memantine 10 milliGRAM(s) Oral daily  multivitamin 1 Tablet(s) Oral daily  pantoprazole    Tablet 40 milliGRAM(s) Oral before breakfast  QUEtiapine 25 milliGRAM(s) Oral at bedtime  sodium chloride 0.9%. 1000 milliLiter(s) (75 mL/Hr) IV Continuous <Continuous>    MEDICATIONS  (PRN):  acetaminophen   Tablet .. 650 milliGRAM(s) Oral every 6 hours PRN Temp greater or equal to 38C (100.4F), Mild Pain (1 - 3)  ALBUTerol/ipratropium for Nebulization 3 milliLiter(s) Nebulizer every 6 hours PRN Shortness of Breath  hydrALAZINE Injectable 10 milliGRAM(s) IV Push every 6 hours PRN SBP>175      RADIOLOGY & ADDITIONAL TEST

## 2019-08-24 NOTE — PROGRESS NOTE ADULT - ASSESSMENT
Pt is 87 y/o male with PMHx of HTN, HLD, CAD, GERD, Dementia, remote hx of prostate cancer, colon cancer 2 years ago s/p chemoradiation, was sent here from NH for PO oral intake and failure to thrive.  In ED labs grossly intact, CTH with no acute finding, UA negative, CXR suspected for metastatic malignancy. Pt admitted for failure to thrive with underlying worsening dementia and metastatic malignancy. Pt started on IVFs with appetite stimulants and his home meds resumed.      A/P    >Failure to thrive/dysphagia  - Probably from underling dementia and metastatic cancer.   - Appreciate swallow eval - mech soft with nectar   - Keep on fall / aspiration precaution, enhanced supervision.   - C/w IVFs, Marinol, encourage PO intake. Nutritionist consult.   - PT evaluation.     >Metastatic colon cancer  - Per daughter-in-law Nirmala Pt had colon cancer 2 years prior, s/p chemoradiation at that time. Now CXR suspected for metastatic malignancy.  - Per daughter-in-law Nirmala-> Family leaning toward not to pursue further work up for his underlying malignancy however pending discussion with Pt's son.   - Palliative care consult requested.       >Comorbidities-Hx of prostate cancer/ HTN / HLD/ CAD/ GERD/ Dementia:  - C/w o/p meds as reconciled.   - Fall/ aspiration precaution, enhanced supervision.   - DVT ppx with Lovenox.

## 2019-08-24 NOTE — SWALLOW BEDSIDE ASSESSMENT ADULT - SWALLOW EVAL: RECOMMENDED FEEDING/EATING TECHNIQUES
allow for swallow between intakes/small sips/bites/crush medication (when feasible)/position upright (90 degrees)/oral hygiene

## 2019-08-24 NOTE — SWALLOW BEDSIDE ASSESSMENT ADULT - SWALLOW EVAL: DIAGNOSIS
Mild oral stage dysphagia with mechanical soft secondary to decreased mastication. Possible pharyngeal stage dysphagia with thin secondary to subsequent swallow and suspected pharyngeal stasis

## 2019-08-24 NOTE — SWALLOW BEDSIDE ASSESSMENT ADULT - SLP PERTINENT HISTORY OF CURRENT PROBLEM
Per Yoko RN, pt with +oral holding of water followed by cough post intake. Diet was downgraded to mechanical soft however not yet initiated

## 2019-08-25 PROCEDURE — 99232 SBSQ HOSP IP/OBS MODERATE 35: CPT

## 2019-08-25 RX ORDER — SODIUM CHLORIDE 9 MG/ML
1000 INJECTION INTRAMUSCULAR; INTRAVENOUS; SUBCUTANEOUS
Refills: 0 | Status: DISCONTINUED | OUTPATIENT
Start: 2019-08-25 | End: 2019-08-27

## 2019-08-25 RX ADMIN — SODIUM CHLORIDE 50 MILLILITER(S): 9 INJECTION INTRAMUSCULAR; INTRAVENOUS; SUBCUTANEOUS at 14:55

## 2019-08-25 RX ADMIN — ATORVASTATIN CALCIUM 20 MILLIGRAM(S): 80 TABLET, FILM COATED ORAL at 22:38

## 2019-08-25 RX ADMIN — ESCITALOPRAM OXALATE 20 MILLIGRAM(S): 10 TABLET, FILM COATED ORAL at 11:57

## 2019-08-25 RX ADMIN — SODIUM CHLORIDE 50 MILLILITER(S): 9 INJECTION INTRAMUSCULAR; INTRAVENOUS; SUBCUTANEOUS at 11:58

## 2019-08-25 RX ADMIN — ENOXAPARIN SODIUM 40 MILLIGRAM(S): 100 INJECTION SUBCUTANEOUS at 11:56

## 2019-08-25 RX ADMIN — MEMANTINE HYDROCHLORIDE 10 MILLIGRAM(S): 10 TABLET ORAL at 11:57

## 2019-08-25 RX ADMIN — Medication 2 MILLIGRAM(S): at 14:56

## 2019-08-25 RX ADMIN — Medication 2 MILLIGRAM(S): at 06:22

## 2019-08-25 RX ADMIN — Medication 1 TABLET(S): at 11:57

## 2019-08-25 RX ADMIN — Medication 325 MILLIGRAM(S): at 11:56

## 2019-08-25 RX ADMIN — Medication 2 MILLIGRAM(S): at 22:38

## 2019-08-25 RX ADMIN — PANTOPRAZOLE SODIUM 40 MILLIGRAM(S): 20 TABLET, DELAYED RELEASE ORAL at 06:22

## 2019-08-25 RX ADMIN — QUETIAPINE FUMARATE 25 MILLIGRAM(S): 200 TABLET, FILM COATED ORAL at 22:38

## 2019-08-25 RX ADMIN — Medication 2.5 MILLIGRAM(S): at 12:01

## 2019-08-25 RX ADMIN — DONEPEZIL HYDROCHLORIDE 10 MILLIGRAM(S): 10 TABLET, FILM COATED ORAL at 22:38

## 2019-08-25 NOTE — PROGRESS NOTE ADULT - ASSESSMENT
Pt is 89 y/o male with PMHx of HTN, HLD, CAD, GERD, Dementia, remote hx of prostate cancer, colon cancer 2 years ago s/p chemoradiation, was sent here from NH for PO oral intake and failure to thrive.  In ED labs grossly intact, CTH with no acute finding, UA negative, CXR suspected for metastatic malignancy. Pt admitted for failure to thrive with underlying worsening dementia and metastatic malignancy.  Palliative care consult appreciated -  MIMI completed DNR/I. Family meeting also arranged for Monday 8/26 at 2PM with son Justin.    A/P    >Failure to thrive/dysphagia  - Probably from underling dementia and metastatic cancer.   - Appreciate swallow eval - mech soft with nectar - need assistance for feeding  - Keep on fall / aspiration precaution, enhanced supervision.   - C/w IVFs, Marinol, encourage PO intake. Nutritionist consult.   - PT evaluation.     >Metastatic colon cancer  - Per daughter-in-law Nirmaal Pt had colon cancer 2 years prior, s/p chemoradiation at that time. Now CXR suspected for metastatic malignancy.  - Per daughter-in-law Nirmala-> Family leaning toward not to pursue further work up for his underlying malignancy however pending discussion with Pt's son.   - Palliative care consult appreciated -  MIMI completed DNR/I. Family meeting also arranged for Monday 8/26 at 2PM with son Justin.      >Comorbidities-Hx of prostate cancer/ HTN / HLD/ CAD/ GERD/ Dementia:  - C/w o/p meds as reconciled.   - Fall/ aspiration precaution, enhanced supervision.   - DVT ppx with Lovenox.

## 2019-08-25 NOTE — PROGRESS NOTE ADULT - SUBJECTIVE AND OBJECTIVE BOX
Internal Medicine Hospitalist - Dr. Jevon BHATT    [id number]    88y      Male    Patient is a 88y old  Male who presents with a chief complaint of failure to thrive (24 Aug 2019 15:44)    INTERVAL HPI/ OVERNIGHT EVENTS: Patient is seen and examined, awake, ate 50% of breakfast, no new event overnight    REVIEW OF SYSTEMS:    Denied fever, chills, abd. pain, nausea, vomiting, chest pain, SOB    PHYSICAL EXAM:    Vital Signs Last 24 Hrs  T(C): 36.4 (25 Aug 2019 00:28), Max: 36.7 (24 Aug 2019 10:48)  T(F): 97.5 (25 Aug 2019 00:28), Max: 98 (24 Aug 2019 10:48)  HR: 65 (25 Aug 2019 00:28) (65 - 65)  BP: 159/76 (25 Aug 2019 00:28) (150/85 - 159/76)  BP(mean): --  RR: 19 (25 Aug 2019 00:28) (19 - 19)  SpO2: 96% (25 Aug 2019 00:28) (96% - 97%)    GENERAL: NAD  CHEST/LUNG: CTA b/l   HEART: S1S2+ audible  ABDOMEN: Soft, Nontender, Nondistended; Bowel sounds present  EXTREMITIES:  no edema  CNS: Awake  Psychiatry: normal mood    LABS:                  MEDICATIONS  (STANDING):  aspirin 325 milliGRAM(s) Oral daily  atorvastatin 20 milliGRAM(s) Oral at bedtime  donepezil 10 milliGRAM(s) Oral at bedtime  dronabinol 2.5 milliGRAM(s) Oral daily  enoxaparin Injectable 40 milliGRAM(s) SubCutaneous daily  escitalopram 20 milliGRAM(s) Oral daily  loperamide 2 milliGRAM(s) Oral three times a day  memantine 10 milliGRAM(s) Oral daily  multivitamin 1 Tablet(s) Oral daily  pantoprazole    Tablet 40 milliGRAM(s) Oral before breakfast  QUEtiapine 25 milliGRAM(s) Oral at bedtime  sodium chloride 0.9%. 1000 milliLiter(s) (75 mL/Hr) IV Continuous <Continuous>    MEDICATIONS  (PRN):  acetaminophen   Tablet .. 650 milliGRAM(s) Oral every 6 hours PRN Temp greater or equal to 38C (100.4F), Mild Pain (1 - 3)  ALBUTerol/ipratropium for Nebulization 3 milliLiter(s) Nebulizer every 6 hours PRN Shortness of Breath  hydrALAZINE Injectable 10 milliGRAM(s) IV Push every 6 hours PRN SBP>175      RADIOLOGY & ADDITIONAL TEST

## 2019-08-25 NOTE — CHART NOTE - NSCHARTNOTEFT_GEN_A_CORE
Upon Nutritional Assessment by the Registered Dietitian your patient was determined to meet criteria / has evidence of the following diagnosis/diagnoses:             [ x ] Severe Protein Calorie Malnutrition       Findings as based on:  •  Comprehensive nutrition assessment and consultation  •  Calorie counts (nutrient intake analysis)  •  Food acceptance and intake status from observations by staff  •  Follow up  •  Patient education  •  Intervention secondary to interdisciplinary rounds  •   concerns      Treatment:    The following diet has been recommended:   ENSURE TID (NECTAR THICKENED)       PROVIDER Section:     By signing this assessment you are acknowledging and agree with the diagnosis/diagnoses assigned by the Registered Dietitian    Comments:

## 2019-08-25 NOTE — DIETITIAN INITIAL EVALUATION ADULT. - FACTORS AFF FOOD INTAKE
difficulty chewing/difficulty feeding self/failure to thrive/difficulty swallowing/persistent lack of appetite

## 2019-08-25 NOTE — DIETITIAN INITIAL EVALUATION ADULT. - OTHER INFO
Pt is 87 y/o male from SNF with PMHx of HTN, HLD, CAD, GERD, Dementia, remote hx of prostate cancer, colon cancer 2 years ago s/p chemoradiation, was sent here from NH for PO oral intake and failure to thrive.  Pt admitted for failure to thrive with underlying worsening dementia and metastatic malignancy. Failure to thrive/dysphagia  - Probably from underling dementia and metastatic cancer.     Pt confused, per documentation- Pt with history of weight loss, UBW 155lb in June, now currently 130lb indicating 25lb wt loss x 2 months.  History of severe malnutrition-- continues.  Pt would benefit from nutrition supplement.

## 2019-08-26 PROCEDURE — 99232 SBSQ HOSP IP/OBS MODERATE 35: CPT

## 2019-08-26 PROCEDURE — 99233 SBSQ HOSP IP/OBS HIGH 50: CPT

## 2019-08-26 RX ADMIN — PANTOPRAZOLE SODIUM 40 MILLIGRAM(S): 20 TABLET, DELAYED RELEASE ORAL at 06:22

## 2019-08-26 RX ADMIN — Medication 1 TABLET(S): at 12:22

## 2019-08-26 RX ADMIN — MEMANTINE HYDROCHLORIDE 10 MILLIGRAM(S): 10 TABLET ORAL at 12:22

## 2019-08-26 RX ADMIN — DONEPEZIL HYDROCHLORIDE 10 MILLIGRAM(S): 10 TABLET, FILM COATED ORAL at 22:30

## 2019-08-26 RX ADMIN — Medication 2 MILLIGRAM(S): at 13:28

## 2019-08-26 RX ADMIN — ATORVASTATIN CALCIUM 20 MILLIGRAM(S): 80 TABLET, FILM COATED ORAL at 22:29

## 2019-08-26 RX ADMIN — Medication 325 MILLIGRAM(S): at 12:21

## 2019-08-26 RX ADMIN — Medication 2 MILLIGRAM(S): at 22:30

## 2019-08-26 RX ADMIN — Medication 2 MILLIGRAM(S): at 06:22

## 2019-08-26 RX ADMIN — ESCITALOPRAM OXALATE 20 MILLIGRAM(S): 10 TABLET, FILM COATED ORAL at 12:22

## 2019-08-26 RX ADMIN — ENOXAPARIN SODIUM 40 MILLIGRAM(S): 100 INJECTION SUBCUTANEOUS at 12:21

## 2019-08-26 RX ADMIN — Medication 2.5 MILLIGRAM(S): at 12:24

## 2019-08-26 RX ADMIN — SODIUM CHLORIDE 50 MILLILITER(S): 9 INJECTION INTRAMUSCULAR; INTRAVENOUS; SUBCUTANEOUS at 12:22

## 2019-08-26 RX ADMIN — SODIUM CHLORIDE 50 MILLILITER(S): 9 INJECTION INTRAMUSCULAR; INTRAVENOUS; SUBCUTANEOUS at 06:23

## 2019-08-26 RX ADMIN — QUETIAPINE FUMARATE 25 MILLIGRAM(S): 200 TABLET, FILM COATED ORAL at 22:30

## 2019-08-26 NOTE — PROGRESS NOTE ADULT - ASSESSMENT
· Assessment		  Pt is 89 y/o male with PMHx of HTN, HLD, CAD, GERD, Dementia, remote hx of prostate cancer, colon cancer 2 years ago s/p chemoradiation, was sent here from NH for PO oral intake and failure to thrive.    In ED labs grossly intact, CTH with no acute finding, UA negative, CXR suspected for metastatic malignancy. Pt admitted for failure to thrive with underlying worsening dementia and metastatic malignancy.    Palliative care consult appreciated -   A/P    >Failure to thrive/dysphagia:  - Probably from underling dementia and metastatic cancer.   -  - PT evaluation pending.     >Metastatic colon cancer  - Per daughter-in-law Nirmala Pt had colon cancer 2 years prior, s/p chemoradiation at that time.   Now CXR suspected for metastatic malignancy.  - Per daughter-in-law Nirmala-> Family leaning towards not to pursue further work up for his underlying malignancy however pending discussion with Pt's son.   -  MIMI completed DNR/I. Family meeting also arranged for Monday 8/26 at 2PM with son Justin.    Dysphagia  Failed Swallow evaluation  Limited diet no thin liquids   - mech soft with nectar - need assistance for feeding  - Keep on fall / aspiration precaution, enhanced supervision.   - C/w IVFs, Marinol, encourage PO intake.   Nutritionist consult noted.        >Dementia-   Fall/ aspiration precaution, enhanced supervision,   C/w Home Aricept, Namenda.     Goals of Care Conversation   MIMI completed DNR/I. on Friday  Family meeting also arranged for today 8/26 at 2PM with three son's    See separate documentation  Naa Patino NP and myself took part in a conference call via phone with three sons.  Patient is a  will most likely need LTC doubtful [facility] will accept him back to [facility] Living[facility]Dementia unit  Communicated with SW handling his case to reach out to [facility] begin process of requesting bed in  their SNF with Hospice support · Assessment		  Pt is 89 y/o male with PMHx of HTN, HLD, CAD, GERD, Dementia, remote hx of prostate cancer, colon cancer 2 years ago s/p chemoradiation,   was sent here from [facility] for Poor oral intake and worsening dementia.   In ED labs grossly intact, CTH with no acute finding, UA negative, CXR suspected for metastatic malignancy. Pt admitted for failure to thrive with underlying worsening dementia and metastatic malignancy.   Advanced Dementia  >Failure to thrive  2nd admission for dehydration related to poor PO intake  He will not eat without being fed, needs more assistance with ADL's  Refusing to get OOB, or cooperate with staff at times  Nocturnal Sundowning-PMH but not lately   Fall/ aspiration precaution, enhanced supervision,  Continue Medications for same  Aricept, Namenda.     Dysphagia:  - Probably from underling dementia and metastatic cancer.   Failed Swallow evaluation  Limited diet no thin liquids   - mech soft with nectar - need assistance for feeding  - Keep on fall / aspiration precaution, enhanced supervision.   - C/w IVFs, Marinol, encourage PO intake.   Nutritionist consult noted.   Comfort Feeds is the plan     >Metastatic colon cancer  - Pt had colon cancer 2 years prior, s/p chemoradiation at that time.   This admission CXR suspicious for metastatic malignancy.   Family has decided not to pursue further work up for his underlying malignancy.     -  MIMI completed DNR/-Page 2 completed   Family meeting today 8/26 was at  2PM with sons Justin., Teodoro and Justin    Goals of Care Conversation   MIMI completed DNR. on Friday  Family meeting  today 8/26 at 2PM with three son's  See separate documentation  Naa Patino NP and myself took part in a conference call via phone with three sons.  Patient is a  will most likely need LTC doubtful [facility] will accept him back to [facility] Living/Dementia unit  Communicated with SW handling his case to reach out to [facility] begin process of requesting bed in  their SNF with Hospice support

## 2019-08-26 NOTE — GOALS OF CARE CONVERSATION - PERSONAL ADVANCE DIRECTIVE - CONVERSATION DETAILS
GOALS OF CARE NOTE  ____________________________________________________________________________________________________  -Call placed to the three sons of Naresh Daly (Teodoro, Sunny, and Roel). Conference call initiated with sons including Elaine Stevens NP, and myself Naa Patino NP of the Palliative Care Team.    -Reviewed advanced directives - confirmed with all three sons that patient is MOLST DNR DNI - at this time they wish to add comfort measures as well as Do Not Rehospitalize to the MOLST form. Family is still interested in IV antibiotic and IV fluid therapy.     -Family expresses concern regarding their father's discharge situation. Family feels their father isn't getting the proper assistance needed while at [facility] - specifically due to the lack of assistance in feeding and drinking.     -Family states they reached out to [facility]  Barbara of [address] [place] [address] to discuss if their father is still a candidate for the [facility] or if there were other options available. Family disclosed that their father is a [place]. We informed the family that this could open up other opportunities for discharge options as due to the [facility]     -Spoke with Barbara -  of [address] [place] [address] - updated her about the conference call that I had with the family - expressed their desire to speak with her in regards to the [facility] and additional  services he may qualify for.   ___________________________________________________________________________________________________    -Contact Numbers for the Three Sons are as follows: Teodoro ([phone]) Roel ([phone]) Sunny ([phone]) - In addition the 2 sons work together in their own business - the shop telephone number is [phone]  ____________________________________________________________________________________________________    COUNSELING:  Advanced Care Planning - Time Spent ___45___Minutes.     Thank you for the opportunity to assist with the care of this patient.   [facility] [place] [facility] Consult Service [phone]. GOALS OF CARE NOTE  ___________________________________________  -Call placed to the three sons of Naresh Daly (Teodoro, Sunny, and Roel). Conference call initiated with sons including Elaine Stevens NP, and myself Naa Patino NP of the Palliative Care Team.    -Reviewed advanced directives - confirmed with all three sons that patient is MOLST DNR DNI - at this time they wish to add comfort measures as well as Do Not Rehospitalize to the MOLST form. Family is still interested in IV antibiotic and IV fluid therapy.     -Family expresses concern regarding their father's discharge situation. Family feels their father isn't getting the proper assistance needed while at [facility] - specifically due to the lack of assistance in feeding and drinking.     -Family states they reached out to [facility]  Barbara of [address] [place] [address] to discuss if their father is still a candidate for the [facility] or if there were other options available. Family disclosed that their father is a [employer]. We informed the family that this could open up other opportunities for discharge options as due to the fact that [facility].     -Spoke with Barbara -  of [address] [place] [address] - updated her about the conference call that I had with the family - expressed their desire to speak with her in regards to the [facility] and additional [employer] services he may qualify for.   ________________________________________________    -Contact Numbers for the Three Sons are as follows: Teodoro ([phone]) Roel ([phone]) Sunny ([phone]) - In addition the 2 sons work together in their own business - the shop telephone number is [phone]  __________________________________________________    COUNSELING:  Advanced Care Planning - Time Spent ___45___Minutes.     Thank you for the opportunity to assist with the care of this patient.   [place] [facility] Consult Service [phone]. GOALS OF CARE NOTE  ______________________________________  -Call placed to the three sons of Naresh Daly (Teodoro, Sunny, and Roel). Conference call initiated with sons including Elaine Stevens NP, and myself Naa Patino NP of the Palliative Care Team.    -Reviewed advanced directives - confirmed with all three sons that patient is MOLST DNR DNI - at this time they wish to add comfort measures as well as Do Not Rehospitalize to the MOLST form. Family is still interested in IV antibiotic and IV fluid therapy.     -Family expresses concern regarding their father's discharge situation. Family feels their father isn't getting the proper assistance needed while at [facility] - specifically due to the lack of assistance in feeding and drinking.     -Family states they reached out to [facility]  Barbara of [address] [place] [address] to discuss if their father is still a candidate for the [facility] or if there were other options available. Family disclosed that their father is a . We informed the family that this could open up other opportunities for discharge options as due to the fact that [facility].     -Spoke with Barbara -  of [address] [place] [address] - updated her about the conference call that I had with the family - expressed their desire to speak with her in regards to the [facility] and additional  services he may qualify for.   _______________________________________    -Contact Numbers for the Three Sons are as follows: Teodoro ([phone]) Roel ([phone]) Sunny ([phone]) - In addition the 2 sons work together in their own business - the shop telephone number is [phone]  _______________________________________    COUNSELING:  Advanced Care Planning - Time Spent ___45___Minutes.     Thank you for the opportunity to assist with the care of this patient.   [place] [facility] Consult Service [phone].

## 2019-08-26 NOTE — PROGRESS NOTE ADULT - SUBJECTIVE AND OBJECTIVE BOX
INTERVAL HPI/OVERNIGHT EVENTS: 87yo Male with End Stage Dementia, recurrent admissions for dehydration Dysphagia-no thin liquids  Patient in a deep sleep during my visit. Airway clear, exhibiting no pain behaviors at this time.     Patient is a 88y old  Male who presents with a chief complaint of SNF MD. (26 Aug 2019 10:12)      Present Symptoms:     Dyspnea: 0 1 2 3   Nausea/Vomiting: Yes No  Anxiety:  Yes No  Depression: Yes No  Fatigue: Yes No  Loss of appetite: Yes No    Pain:             Character-            Duration-            Effect-            Factors-            Frequency-            Location-            Severity-    Review of Systems: Reviewed                     Negative:                     Positive:  Unable to obtain due to poor mentation   All others negative    MEDICATIONS  (STANDING):  aspirin 325 milliGRAM(s) Oral daily  atorvastatin 20 milliGRAM(s) Oral at bedtime  donepezil 10 milliGRAM(s) Oral at bedtime  dronabinol 2.5 milliGRAM(s) Oral daily  enoxaparin Injectable 40 milliGRAM(s) SubCutaneous daily  escitalopram 20 milliGRAM(s) Oral daily  loperamide 2 milliGRAM(s) Oral three times a day  memantine 10 milliGRAM(s) Oral daily  multivitamin 1 Tablet(s) Oral daily  pantoprazole    Tablet 40 milliGRAM(s) Oral before breakfast  QUEtiapine 25 milliGRAM(s) Oral at bedtime  sodium chloride 0.9%. 1000 milliLiter(s) (50 mL/Hr) IV Continuous <Continuous>    MEDICATIONS  (PRN):  acetaminophen   Tablet .. 650 milliGRAM(s) Oral every 6 hours PRN Temp greater or equal to 38C (100.4F), Mild Pain (1 - 3)  ALBUTerol/ipratropium for Nebulization 3 milliLiter(s) Nebulizer every 6 hours PRN Shortness of Breath  hydrALAZINE Injectable 10 milliGRAM(s) IV Push every 6 hours PRN SBP>175      PHYSICAL EXAM:    Vital Signs Last 24 Hrs  T(C): 36.5 (26 Aug 2019 16:26), Max: 36.9 (26 Aug 2019 09:04)  T(F): 97.7 (26 Aug 2019 16:26), Max: 98.4 (26 Aug 2019 09:04)  HR: 62 (26 Aug 2019 16:26) (52 - 69)  BP: 110/63 (26 Aug 2019 16:26) (110/63 - 148/76)  BP(mean): --  RR: 18 (26 Aug 2019 16:26) (18 - 18)  SpO2: 97% (26 Aug 2019 16:26) (93% - 97%)    General: alert  oriented x ____ lethargic agitated                  cachexia  nonverbal  coma    Karnofsky:  %    HEENT: normal  dry mouth  ET tube/trach    Lungs: comfortable tachypnea/labored breathing  excessive secretions    CV: normal  tachycardia    GI: normal  distended  tender  no BS               PEG/NG/OG tube  constipation  last BM:     : normal  incontinent  oliguria/anuria  pan[place]    MSK: normal  weakness  edema             ambulatory  bedbound/wheelchair bound    Skin: normal  pressure ulcers- Stage_____  no rash    LABS:              I&O's Summary    25 Aug 2019 07:01  -  26 Aug 2019 07:00  --------------------------------------------------------  IN: 925 mL / OUT: 0 mL / NET: 925 mL        RADIOLOGY & ADDITIONAL STUDIES:    ADVANCE DIRECTIVES:   DNR YES NO  Completed on:                     MOLST  YES NO   Completed on:  Living Will  YES NO   Completed on:    COUNSELING:    Face to face meeting to discuss Advanced Care Planning - Time Spent ______ Minutes.  See goals of care note.    More than 50% time spent in counseling and coordinating care. ______ Minutes.     Thank you for the opportunity to assist with the care of this patient.   [place] [facility] Consult Service [phone]. INTERVAL HPI/OVERNIGHT EVENTS: 89yo Male with End Stage Dementia, recurrent admissions for dehydration Dysphagia-no thin liquids  Patient in a deep sleep during my visit. Airway clear, exhibiting no pain behaviors at this time.     Patient is a 88y old  Male who presents with a chief complaint of SNF MD. (26 Aug 2019 10:12)      Present Symptoms:     Dyspnea: 0    Nausea/Vomiting:  No  Anxiety:  Yes   Depression:  No  Fatigue: Yes   Loss of appetite: Yes     Pain: no pain behaviors observed            Character-            Duration-            Effect-            Factors-            Frequency-            Location-            Severity-    Review of Systems: Reviewed                       Unable to obtain due to poor mentation       MEDICATIONS  (STANDING):  aspirin 325 milliGRAM(s) Oral daily  atorvastatin 20 milliGRAM(s) Oral at bedtime  donepezil 10 milliGRAM(s) Oral at bedtime  dronabinol 2.5 milliGRAM(s) Oral daily  enoxaparin Injectable 40 milliGRAM(s) SubCutaneous daily  escitalopram 20 milliGRAM(s) Oral daily  loperamide 2 milliGRAM(s) Oral three times a day  memantine 10 milliGRAM(s) Oral daily  multivitamin 1 Tablet(s) Oral daily  pantoprazole    Tablet 40 milliGRAM(s) Oral before breakfast  QUEtiapine 25 milliGRAM(s) Oral at bedtime  sodium chloride 0.9%. 1000 milliLiter(s) (50 mL/Hr) IV Continuous <Continuous>    MEDICATIONS  (PRN):  acetaminophen   Tablet .. 650 milliGRAM(s) Oral every 6 hours PRN Temp greater or equal to 38C (100.4F), Mild Pain (1 - 3)  ALBUTerol/ipratropium for Nebulization 3 milliLiter(s) Nebulizer every 6 hours PRN Shortness of Breath  hydrALAZINE Injectable 10 milliGRAM(s) IV Push every 6 hours PRN SBP>175      PHYSICAL EXAM:    Vital Signs Last 24 Hrs  T(C): 36.5 (26 Aug 2019 16:26), Max: 36.9 (26 Aug 2019 09:04)  T(F): 97.7 (26 Aug 2019 16:26), Max: 98.4 (26 Aug 2019 09:04)  HR: 62 (26 Aug 2019 16:26) (52 - 69)  BP: 110/63 (26 Aug 2019 16:26) (110/63 - 148/76)  BP(mean): --  RR: 18 (26 Aug 2019 16:26) (18 - 18)  SpO2: 97% (26 Aug 2019 16:26) (93% - 97%)    General: __ lethargic sleeping                  cachexia  nonverbal     HEENT: normal  dry mouth     Lungs: comfortable    CV: normal      GI: normal  distended                    : normal  incontinent  [place]    MSK:   weakness              bedbound/wheelchair bound    Skin: normal    no rash    LABS:    I &O's Summary    25 Aug 2019 07:01  -  26 Aug 2019 07:00  --------------------------------------------------------  IN: 925 mL / OUT: 0 mL / NET: 925 mL    RADIOLOGY & ADDITIONAL STUDIES:    ADVANCE DIRECTIVES:   DNR YES   Completed on:                     MIMI  YES   Completed Page 2 on: Today after family conference call meeting  Living Will  YES NO   Completed on:

## 2019-08-26 NOTE — GOALS OF CARE CONVERSATION - PERSONAL ADVANCE DIRECTIVE - CONVERSATION/DISCUSSION
Palliative Care Referral/Diagnosis/MOLST Discussed
Treatment Options/Diagnosis/Prognosis/Palliative Care Referral/MOLST Discussed

## 2019-08-26 NOTE — PROGRESS NOTE ADULT - SUBJECTIVE AND OBJECTIVE BOX
VEENA BHATT Male 88y [id number]    Patient is a 88y old  Male who presents with a chief complaint of failure to thrive (25 Aug 2019 08:34)      Subjective/objective:  Pt seen and examined at bedside, no over night event reported by night staff. Pt with underlying dementia, ROS unable to obtain. Pt resting comfortably NAD.       PHYSICAL EXAM:    Vital Signs Last 24 Hrs  T(C): 36.9 (26 Aug 2019 09:04), Max: 36.9 (26 Aug 2019 09:04)  T(F): 98.4 (26 Aug 2019 09:04), Max: 98.4 (26 Aug 2019 09:04)  HR: 52 (26 Aug 2019 09:04) (52 - 69)  BP: 148/76 (26 Aug 2019 09:04) (103/45 - 148/76)  BP(mean): --  RR: 18 (26 Aug 2019 09:04) (18 - 18)  SpO2: 93% (26 Aug 2019 09:04) (93% - 96%)    GENERAL: Pt lying comfortably, NAD.  CHEST/LUNG: Clear to auscultation bilaterally; No wheezing.  HEART: S1S2+, Regular rate and rhythm; No murmurs.  ABDOMEN: Soft, Nontender, Nondistended; Bowel sounds present.  Extremities: No LE edema, pulses+  NEURO: AAOX1, demented, does not follow commands. Moves extremities.   PSYCH: normal mood.      MEDICATIONS  (STANDING):  aspirin 325 milliGRAM(s) Oral daily  atorvastatin 20 milliGRAM(s) Oral at bedtime  donepezil 10 milliGRAM(s) Oral at bedtime  dronabinol 2.5 milliGRAM(s) Oral daily  enoxaparin Injectable 40 milliGRAM(s) SubCutaneous daily  escitalopram 20 milliGRAM(s) Oral daily  loperamide 2 milliGRAM(s) Oral three times a day  memantine 10 milliGRAM(s) Oral daily  multivitamin 1 Tablet(s) Oral daily  pantoprazole    Tablet 40 milliGRAM(s) Oral before breakfast  QUEtiapine 25 milliGRAM(s) Oral at bedtime  sodium chloride 0.9%. 1000 milliLiter(s) (50 mL/Hr) IV Continuous <Continuous>    MEDICATIONS  (PRN):  acetaminophen   Tablet .. 650 milliGRAM(s) Oral every 6 hours PRN Temp greater or equal to 38C (100.4F), Mild Pain (1 - 3)  ALBUTerol/ipratropium for Nebulization 3 milliLiter(s) Nebulizer every 6 hours PRN Shortness of Breath  hydrALAZINE Injectable 10 milliGRAM(s) IV Push every 6 hours PRN SBP>175        Labs:  LABS:

## 2019-08-26 NOTE — PROGRESS NOTE ADULT - ASSESSMENT
Pt is 89 y/o male with PMHx of HTN, HLD, CAD, GERD, Dementia, remote hx of prostate cancer, colon cancer 2 years ago s/p chemoradiation, was sent here from NH for PO oral intake and failure to thrive.  In ED labs grossly intact, CTH with no acute finding, UA negative, CXR suspected for metastatic malignancy. Pt admitted for failure to thrive with underlying worsening dementia and metastatic malignancy.  Palliative care consult appreciated -  MOLST completed DNR/I. Family meeting also arranged for Monday 8/26 at 2PM with son Justin.    A/P    >Failure to thrive/dysphagia:  - Probably from underling dementia and metastatic cancer.   - Appreciate swallow eval - mech soft with nectar - need assistance for feeding  - Keep on fall / aspiration precaution, enhanced supervision.   - C/w IVFs, Marinol, encourage PO intake. Nutritionist consult noted.    - PT evaluation pending.     >Metastatic colon cancer  - Per daughter-in-law Nirmala Pt had colon cancer 2 years prior, s/p chemoradiation at that time. Now CXR suspected for metastatic malignancy.  - Per daughter-in-law Nirmala-> Family leaning towards not to pursue further work up for his underlying malignancy however pending discussion with Pt's son.   - Palliative care consult appreciated -  MIMI completed DNR/I. Family meeting also arranged for Monday 8/26 at 2PM with son Justin.    >HTN- BP stable, Keep on Hydralazine prn.   >HLD- Lipitor  >Dementia- Fall/ aspiration precaution, enhanced supervision, C/w Home Aricept, Namenda.   >GERD- PPI  >CAD- Stable, C/w ASA/ Statins.   >Hx Prostate cancer- Treated per family.   >DVT ppx with Lovenox.

## 2019-08-27 PROCEDURE — 99232 SBSQ HOSP IP/OBS MODERATE 35: CPT

## 2019-08-27 RX ADMIN — ATORVASTATIN CALCIUM 20 MILLIGRAM(S): 80 TABLET, FILM COATED ORAL at 21:48

## 2019-08-27 RX ADMIN — PANTOPRAZOLE SODIUM 40 MILLIGRAM(S): 20 TABLET, DELAYED RELEASE ORAL at 06:09

## 2019-08-27 RX ADMIN — Medication 1 TABLET(S): at 11:51

## 2019-08-27 RX ADMIN — Medication 2.5 MILLIGRAM(S): at 11:54

## 2019-08-27 RX ADMIN — ESCITALOPRAM OXALATE 20 MILLIGRAM(S): 10 TABLET, FILM COATED ORAL at 11:51

## 2019-08-27 RX ADMIN — Medication 2 MILLIGRAM(S): at 21:48

## 2019-08-27 RX ADMIN — Medication 325 MILLIGRAM(S): at 11:51

## 2019-08-27 RX ADMIN — DONEPEZIL HYDROCHLORIDE 10 MILLIGRAM(S): 10 TABLET, FILM COATED ORAL at 21:48

## 2019-08-27 RX ADMIN — Medication 2 MILLIGRAM(S): at 06:09

## 2019-08-27 RX ADMIN — MEMANTINE HYDROCHLORIDE 10 MILLIGRAM(S): 10 TABLET ORAL at 11:51

## 2019-08-27 RX ADMIN — ENOXAPARIN SODIUM 40 MILLIGRAM(S): 100 INJECTION SUBCUTANEOUS at 11:51

## 2019-08-27 RX ADMIN — QUETIAPINE FUMARATE 25 MILLIGRAM(S): 200 TABLET, FILM COATED ORAL at 21:48

## 2019-08-27 RX ADMIN — SODIUM CHLORIDE 50 MILLILITER(S): 9 INJECTION INTRAMUSCULAR; INTRAVENOUS; SUBCUTANEOUS at 11:51

## 2019-08-27 NOTE — PHYSICAL THERAPY INITIAL EVALUATION ADULT - PERTINENT HX OF CURRENT PROBLEM, REHAB EVAL
Pt is 87 y/o male with PMHx of HTN, HLD, CAD, GERD, Dementia, remote hx of prostate cancer, colon cancer 2 years ago s/p chemoradiation, was sent here from NH for PO oral intake and failure to thrive.  In ED labs grossly intact, CTH with no acute finding, UA negative, CXR suspected for metastatic malignancy. Pt admitted for failure to thrive with underlying worsening dementia and metastatic malignancy.

## 2019-08-27 NOTE — PROGRESS NOTE ADULT - ATTENDING COMMENTS
COUNSELING:    Face to face meeting to discuss Advanced Care Planning - Time Spent ______ Minutes.  See goals of care note.    More than 50% time spent in counseling and coordinating care. ______ Minutes.     Thank you for the opportunity to assist with the care of this patient.   [facility] Consult Service [phone].
COUNSELING:    Face to face meeting to discuss Advanced Care Planning - Time Spent __45____ Minutes.  See goals of care note.    More than 50% time spent in counseling and coordinating care. __15____ Minutes.     Thank you for the opportunity to assist with the care of this patient.   [place] [facility] Consult Service [phone].

## 2019-08-27 NOTE — CHART NOTE - NSCHARTNOTEFT_GEN_A_CORE
Source: Patient [ ]  Family [ ]   other [x] EMR, staff and ID rounds     Current Diet:   Diet, Dysphagia 2 Mechanical Soft-Nectar Consistency Fluid:   Supplement Feeding Modality:  Oral  Ensure Enlive Servings Per Day:  1       Frequency:  Three Times a day (08-26-19 @ 10:23)    Patient reports [ ] nausea  [ ] vomiting [ ] diarrhea [ ] constipation  [x]chewing problems [x] swallowing issues  [ ] other:     PO intake:  < 50% [ ]   50-75%  [ ]   %  [x]  other :    Source for PO intake [ ] Patient [ ] family [x] chart [x] staff [ ] other    Current Weight:   (8/22)   130.9 lbs    % Weight Change: No recent weight documented     Pertinent Medications: MEDICATIONS  (STANDING):  aspirin 325 milliGRAM(s) Oral daily  atorvastatin 20 milliGRAM(s) Oral at bedtime  donepezil 10 milliGRAM(s) Oral at bedtime  dronabinol 2.5 milliGRAM(s) Oral daily  enoxaparin Injectable 40 milliGRAM(s) SubCutaneous daily  escitalopram 20 milliGRAM(s) Oral daily  loperamide 2 milliGRAM(s) Oral three times a day  memantine 10 milliGRAM(s) Oral daily  multivitamin 1 Tablet(s) Oral daily  pantoprazole    Tablet 40 milliGRAM(s) Oral before breakfast  QUEtiapine 25 milliGRAM(s) Oral at bedtime    MEDICATIONS  (PRN):  acetaminophen   Tablet .. 650 milliGRAM(s) Oral every 6 hours PRN Temp greater or equal to 38C (100.4F), Mild Pain (1 - 3)  ALBUTerol/ipratropium for Nebulization 3 milliLiter(s) Nebulizer every 6 hours PRN Shortness of Breath  hydrALAZINE Injectable 10 milliGRAM(s) IV Push every 6 hours PRN SBP>175    Pertinent Labs: No recent nutrition-related labs     Skin: No skin breakdown/edema noted     Nutrition focused physical exam conducted - found signs of malnutrition [ ]absent [x]present    Subcutaneous fat loss: [ ] Orbital fat pads region, [ ]Buccal fat region, [ ]Triceps region,  [ ]Ribs region    Muscle wasting: [x]Temples region, [x]Clavicle region, [x]Shoulder region, [ ]Scapula region, [ ]Interosseous region,  [ ]thigh region, [x]Calf region    Estimated Needs:   [x] no change since previous assessment  [ ] recalculated:     Current Nutrition Diagnosis: Pt remains at high nutrition risk secondary to malnutrition (severe chronic) related to inadequate protein energy intake in setting of metastatic colon CA, dysphagia and poor PO intake likely 2/2 underlying dementia as evidenced by meeting <50% of estimated protein-energy needs >1 month, unintentional 25lb (16%) wt loss x 2 months, consistent with findings of NFPE-severe muscle wasting (temples, clavicles, shoulders, calves). Per RN Pt with much improved PO intake, completing >75% of meals, taking Ensure well.     Recommendations:   1) Continue with current nutrition plan as tolerated  2) Continue Ensure Enlive TID   3) Monitor daily weihts, strict I&Os   4) Obtain/provide food preferences daily to inc PO     Monitoring and Evaluation:   [x] PO intake [x] Tolerance to diet prescription [X] Weights  [X] Follow up per protocol [X] Labs:

## 2019-08-27 NOTE — PROGRESS NOTE ADULT - SUBJECTIVE AND OBJECTIVE BOX
INTERVAL HPI/OVERNIGHT EVENTS: 87yo Male Patient PMH of Advanced Dementia, difficulty swallowing Poor PO intake and new Lung Mass.  This morning Patient is lying in bed awake and calm. He is urinating and +BM's no pain behaviors observed     88y old  Male who presents with a chief complaint of SNF MD. (27 Aug 2019 11:12)      Present Symptoms:     Dyspnea: 0 1 2 3   Nausea/Vomiting: Yes No  Anxiety:  Yes No  Depression: Yes No  Fatigue: Yes No  Loss of appetite: Yes No    Pain:             Character-            Duration-            Effect-            Factors-            Frequency-            Location-            Severity-    Review of Systems: Reviewed                     Negative:                     Positive:  Unable to obtain due to poor mentation   All others negative    MEDICATIONS  (STANDING):  aspirin 325 milliGRAM(s) Oral daily  atorvastatin 20 milliGRAM(s) Oral at bedtime  donepezil 10 milliGRAM(s) Oral at bedtime  dronabinol 2.5 milliGRAM(s) Oral daily  enoxaparin Injectable 40 milliGRAM(s) SubCutaneous daily  escitalopram 20 milliGRAM(s) Oral daily  loperamide 2 milliGRAM(s) Oral three times a day  memantine 10 milliGRAM(s) Oral daily  multivitamin 1 Tablet(s) Oral daily  pantoprazole    Tablet 40 milliGRAM(s) Oral before breakfast  QUEtiapine 25 milliGRAM(s) Oral at bedtime  sodium chloride 0.9%. 1000 milliLiter(s) (50 mL/Hr) IV Continuous <Continuous>    MEDICATIONS  (PRN):  acetaminophen   Tablet .. 650 milliGRAM(s) Oral every 6 hours PRN Temp greater or equal to 38C (100.4F), Mild Pain (1 - 3)  ALBUTerol/ipratropium for Nebulization 3 milliLiter(s) Nebulizer every 6 hours PRN Shortness of Breath  hydrALAZINE Injectable 10 milliGRAM(s) IV Push every 6 hours PRN SBP>175      PHYSICAL EXAM:    Vital Signs Last 24 Hrs  T(C): 36.9 (27 Aug 2019 07:39), Max: 36.9 (26 Aug 2019 23:52)  T(F): 98.5 (27 Aug 2019 07:39), Max: 98.5 (26 Aug 2019 23:52)  HR: 59 (27 Aug 2019 07:39) (56 - 62)  BP: 137/70 (27 Aug 2019 07:39) (110/63 - 143/71)  BP(mean): --  RR: 18 (27 Aug 2019 07:39) (18 - 18)  SpO2: 96% (27 Aug 2019 07:39) (96% - 97%)    General: alert  oriented x ____ lethargic agitated                  cachexia  nonverbal  coma    Karnofsky:  %    HEENT: normal  dry mouth  ET tube/trach    Lungs: comfortable tachypnea/labored breathing  excessive secretions    CV: normal  tachycardia    GI: normal  distended  tender  no BS               PEG/NG/OG tube  constipation  last BM:     : normal  incontinent  oliguria/anuria  [place]    MSK: normal  weakness  edema             ambulatory  bedbound/wheelchair bound    Skin: normal  pressure ulcers- Stage_____  no rash    LABS:              I&O's Summary    26 Aug 2019 07:01  -  27 Aug 2019 07:00  --------------------------------------------------------  IN: 550 mL / OUT: 3 mL / NET: 547 mL        RADIOLOGY & ADDITIONAL STUDIES:    ADVANCE DIRECTIVES:   DNR YES NO  Completed on:                     MOLST  YES NO   Completed on:  Living Will  YES NO   Completed on:    COUNSELING:    Face to face meeting to discuss Advanced Care Planning - Time Spent ______ Minutes.  See goals of care note.    More than 50% time spent in counseling and coordinating care. ______ Minutes.     Thank you for the opportunity to assist with the care of this patient.   [facility] Consult Service [phone]. INTERVAL HPI/OVERNIGHT EVENTS: 87yo Male Patient PMH of Advanced Dementia, difficulty swallowing Poor PO intake and new Lung Mass.  This morning Patient is lying in bed awake and calm. He is urinating and +BM's no pain behaviors observed     88y old  Male who presents with a chief complaint of SNF MD. (27 Aug 2019 11:12)      Present Symptoms:     Dyspnea: 0    Nausea/Vomiting:  No  Anxiety:  No  Depression: No  Fatigue: Yes   Loss of appetite: Yes     Pain:  No pain behaviors observed            Character-            Duration-            Effect-            Factors-            Frequency-            Location-            Severity-    Review of Systems: Reviewed                                   Unable to obtain due to poor mentation       MEDICATIONS  (STANDING):  aspirin 325 milliGRAM(s) Oral daily  atorvastatin 20 milliGRAM(s) Oral at bedtime  donepezil 10 milliGRAM(s) Oral at bedtime  dronabinol 2.5 milliGRAM(s) Oral daily  enoxaparin Injectable 40 milliGRAM(s) SubCutaneous daily  escitalopram 20 milliGRAM(s) Oral daily  loperamide 2 milliGRAM(s) Oral three times a day  memantine 10 milliGRAM(s) Oral daily  multivitamin 1 Tablet(s) Oral daily  pantoprazole    Tablet 40 milliGRAM(s) Oral before breakfast  QUEtiapine 25 milliGRAM(s) Oral at bedtime  sodium chloride 0.9%. 1000 milliLiter(s) (50 mL/Hr) IV Continuous <Continuous>    MEDICATIONS  (PRN):  acetaminophen   Tablet .. 650 milliGRAM(s) Oral every 6 hours PRN Temp greater or equal to 38C (100.4F), Mild Pain (1 - 3)  ALBUTerol/ipratropium for Nebulization 3 milliLiter(s) Nebulizer every 6 hours PRN Shortness of Breath  hydrALAZINE Injectable 10 milliGRAM(s) IV Push every 6 hours PRN SBP>175      PHYSICAL EXAM:    Vital Signs Last 24 Hrs  T(C): 36.9 (27 Aug 2019 07:39), Max: 36.9 (26 Aug 2019 23:52)  T(F): 98.5 (27 Aug 2019 07:39), Max: 98.5 (26 Aug 2019 23:52)  HR: 59 (27 Aug 2019 07:39) (56 - 62)  BP: 137/70 (27 Aug 2019 07:39) (110/63 - 143/71)  BP(mean): --  RR: 18 (27 Aug 2019 07:39) (18 - 18)  SpO2: 96% (27 Aug 2019 07:39) (96% - 97%)    General: alert  ____ and awake                  thin  nonverbal   HEENT  dry mouth      Lungs: comfortable    CV: normal   GI: normal               constipation  last BM: 2 bm's    : normal  incontinent      MSK: weakness  edema            bedbound/    Skin: normal    no rash    LABS:    I&O's Summary    26 Aug 2019 07:01  -  27 Aug 2019 07:00  --------------------------------------------------------  IN: 550 mL / OUT: 3 mL / NET: 547 mL    RADIOLOGY & ADDITIONAL STUDIES:    ADVANCE DIRECTIVES:   DNR YES   Completed on:                     MOLST  YES    Completed on:  Living Will   NO   Completed on:    COUNSELING:    Face to face meeting to discuss Advanced Care Planning - Time Spent ______ Minutes.  See goals of care note.    More than 50% time spent in counseling and coordinating care. ______ Minutes.     Thank you for the opportunity to assist with the care of this patient.   [facility] Consult Service [phone].

## 2019-08-27 NOTE — PROGRESS NOTE ADULT - ASSESSMENT
Pt is 89yo M with Advanced dementia- non verbal treated for Colon Ca 2 years ago. Transferred from [facility] safe unit because decrease in oral intake, failure to thrive.  CXR suspicious for metastatic malignancy-family meeting yesterday. Goals of Care established. [facility] is a , [facility]  [facility] return to [facility] on comfort care.  Advanced Dementia  >Failure to thrive  2nd admission for dehydration related to poor PO intake  He will not eat without being fed, needs more assistance with ADL's  Cooperated with Physical Therapist today- OOB walker  Nocturnal Sundowning-PMH but not lately   Fall/ aspiration precaution, enhanced supervision,  Continue Medications for same  Aricept, Namenda.     Dysphagia:  - Probably from underling dementia and metastatic cancer.   Failed Swallow evaluation  Limited diet no thin liquids   - mech soft with nectar - need assistance for feeding  - Keep on fall / aspiration precaution, enhanced supervision.   - C/w IVFs, Marinol, encourage PO intake.   Nutritionist consult noted.   Comfort Feeds is the plan     >Metastatic colon cancer  - Pt had colon cancer 2 years prior, s/p chemoradiation at that time.   This admission CXR suspicious for metastatic malignancy.   Family has decided not to pursue further work up for his underlying malignancy.     -  MIMI completed DNR/-Page 2 completed      Goals of Care PATEL LE completed DNR. on Friday  GOC Meeting yesterday  See separate documentation 08/26/2019  Patient is a  will most likely need LTC doubtful [facility] will accept him back to [facility] Living[facility]Dementia unit  Communicated with SW handling his case to reach out to [facility] begin process of requesting bed in  their SNF with Hospice support

## 2019-08-27 NOTE — PROGRESS NOTE ADULT - SUBJECTIVE AND OBJECTIVE BOX
VEENA BHATT Male 88y [id number]    Patient is a 88y old  Male who presents with a chief complaint of SNF MD. (26 Aug 2019 16:45)      Subjective/objective:  Subjective/objective:  Pt seen and examined at bedside, no over night event reported by night staff. Pt with underlying dementia, ROS unable to obtain. Pt resting comfortably NAD.     PHYSICAL EXAM:    Vital Signs Last 24 Hrs  T(C): 36.9 (27 Aug 2019 07:39), Max: 36.9 (26 Aug 2019 23:52)  T(F): 98.5 (27 Aug 2019 07:39), Max: 98.5 (26 Aug 2019 23:52)  HR: 59 (27 Aug 2019 07:39) (56 - 62)  BP: 137/70 (27 Aug 2019 07:39) (110/63 - 143/71)  BP(mean): --  RR: 18 (27 Aug 2019 07:39) (18 - 18)  SpO2: 96% (27 Aug 2019 07:39) (96% - 97%)    GENERAL: Pt lying comfortably, NAD.  CHEST/LUNG: Clear to auscultation bilaterally; No wheezing.  HEART: S1S2+, Regular rate and rhythm; No murmurs.  ABDOMEN: Soft, Nontender, Nondistended; Bowel sounds present.  Extremities: No LE edema, pulses+  NEURO: AAOX1, demented, does not follow commands. Moves extremities.   PSYCH: normal mood.      MEDICATIONS  (STANDING):  aspirin 325 milliGRAM(s) Oral daily  atorvastatin 20 milliGRAM(s) Oral at bedtime  donepezil 10 milliGRAM(s) Oral at bedtime  dronabinol 2.5 milliGRAM(s) Oral daily  enoxaparin Injectable 40 milliGRAM(s) SubCutaneous daily  escitalopram 20 milliGRAM(s) Oral daily  loperamide 2 milliGRAM(s) Oral three times a day  memantine 10 milliGRAM(s) Oral daily  multivitamin 1 Tablet(s) Oral daily  pantoprazole    Tablet 40 milliGRAM(s) Oral before breakfast  QUEtiapine 25 milliGRAM(s) Oral at bedtime  sodium chloride 0.9%. 1000 milliLiter(s) (50 mL/Hr) IV Continuous <Continuous>    MEDICATIONS  (PRN):  acetaminophen   Tablet .. 650 milliGRAM(s) Oral every 6 hours PRN Temp greater or equal to 38C (100.4F), Mild Pain (1 - 3)  ALBUTerol/ipratropium for Nebulization 3 milliLiter(s) Nebulizer every 6 hours PRN Shortness of Breath  hydrALAZINE Injectable 10 milliGRAM(s) IV Push every 6 hours PRN SBP>175        Labs:  LABS:

## 2019-08-27 NOTE — PHYSICAL THERAPY INITIAL EVALUATION ADULT - ADDITIONAL COMMENTS
pt unable to provide history due to cognition. per chart, pt was on the dementia unit at the [facility] assisted living. amb status unknown.

## 2019-08-27 NOTE — PROGRESS NOTE ADULT - ASSESSMENT
Pt is 89 y/o male with PMHx of HTN, HLD, CAD, GERD, Dementia, remote hx of prostate cancer, colon cancer 2 years ago s/p chemoradiation, was sent here from NH for PO oral intake and failure to thrive.  In ED labs grossly intact, CTH with no acute finding, UA negative, CXR suspected for metastatic malignancy. Pt admitted for failure to thrive with underlying worsening dementia and metastatic malignancy.  Palliative care consult appreciated -  MOLST completed DNR/I. Family meeting by palliative with 3 sons- please see [facility] conversation note by palliative on 8/26 for details-> Family not to pursue any further w/u for malignancy.     >Failure to thrive/dysphagia:  - Probably from underling dementia and metastatic cancer.   - Appreciate swallow eval - mech soft with nectar - need assistance for feeding  - Keep on fall / aspiration precaution, enhanced supervision.   - C/w Marinol, encourage PO intake. Nutritionist consult noted. Per RN pt eating well now- will d/c IVFs.     - PT evaluation pending.     >Metastatic colon cancer  - Palliative had meeting with 3 sons- family does not want to pursue further work up for underlying malignancy.   - Supportive measure.   - Palliative care following.   - DNR/ DNI.     >HTN- BP stable, Keep on Hydralazine prn.   >HLD- Lipitor  >Dementia- Fall/ aspiration precaution, enhanced supervision, C/w Home Aricept, Namenda.   >GERD- PPI  >CAD- Stable, C/w ASA/ Statins.   >Hx Prostate cancer- Treated per family.   >DVT ppx with Lovenox.   >Dispo- Pt from [facility]- Pt is [facility]. Family wants to explore alternate options. Dispo unclear at this time, PT eval pending. Pt otherwise medically stable. CCM/ SW notified.

## 2019-08-27 NOTE — PHYSICAL THERAPY INITIAL EVALUATION ADULT - CRITERIA FOR SKILLED THERAPEUTIC INTERVENTIONS
rehab potential/anticipated equipment needs at discharge/risk reduction/prevention/impairments found/therapy frequency/anticipated discharge recommendation

## 2019-08-28 ENCOUNTER — TRANSCRIPTION ENCOUNTER (OUTPATIENT)
Age: 84
End: 2019-08-28

## 2019-08-28 VITALS
HEART RATE: 76 BPM | OXYGEN SATURATION: 93 % | DIASTOLIC BLOOD PRESSURE: 69 MMHG | SYSTOLIC BLOOD PRESSURE: 106 MMHG | TEMPERATURE: 99 F | RESPIRATION RATE: 18 BRPM

## 2019-08-28 PROCEDURE — 99239 HOSP IP/OBS DSCHRG MGMT >30: CPT

## 2019-08-28 RX ORDER — QUETIAPINE FUMARATE 200 MG/1
1 TABLET, FILM COATED ORAL
Qty: 0 | Refills: 0 | DISCHARGE
Start: 2019-08-28

## 2019-08-28 RX ORDER — PANTOPRAZOLE SODIUM 20 MG/1
1 TABLET, DELAYED RELEASE ORAL
Qty: 0 | Refills: 0 | DISCHARGE
Start: 2019-08-28

## 2019-08-28 RX ORDER — PANTOPRAZOLE SODIUM 20 MG/1
1 TABLET, DELAYED RELEASE ORAL
Qty: 0 | Refills: 0 | DISCHARGE

## 2019-08-28 RX ORDER — ATORVASTATIN CALCIUM 80 MG/1
1 TABLET, FILM COATED ORAL
Qty: 0 | Refills: 0 | DISCHARGE
Start: 2019-08-28

## 2019-08-28 RX ORDER — LOPERAMIDE HCL 2 MG
1 TABLET ORAL
Qty: 0 | Refills: 0 | DISCHARGE

## 2019-08-28 RX ORDER — DONEPEZIL HYDROCHLORIDE 10 MG/1
1 TABLET, FILM COATED ORAL
Qty: 0 | Refills: 0 | DISCHARGE
Start: 2019-08-28

## 2019-08-28 RX ORDER — IPRATROPIUM/ALBUTEROL SULFATE 18-103MCG
3 AEROSOL WITH ADAPTER (GRAM) INHALATION
Qty: 0 | Refills: 0 | DISCHARGE
Start: 2019-08-28

## 2019-08-28 RX ORDER — LOPERAMIDE HCL 2 MG
1 TABLET ORAL
Qty: 0 | Refills: 0 | DISCHARGE
Start: 2019-08-28

## 2019-08-28 RX ORDER — MEMANTINE HYDROCHLORIDE 10 MG/1
1 TABLET ORAL
Qty: 0 | Refills: 0 | DISCHARGE
Start: 2019-08-28

## 2019-08-28 RX ORDER — ESCITALOPRAM OXALATE 10 MG/1
1 TABLET, FILM COATED ORAL
Qty: 0 | Refills: 0 | DISCHARGE

## 2019-08-28 RX ORDER — ESCITALOPRAM OXALATE 10 MG/1
1 TABLET, FILM COATED ORAL
Qty: 0 | Refills: 0 | DISCHARGE
Start: 2019-08-28

## 2019-08-28 RX ORDER — DRONABINOL 2.5 MG
1 CAPSULE ORAL
Qty: 0 | Refills: 0 | DISCHARGE
Start: 2019-08-28

## 2019-08-28 RX ORDER — ATORVASTATIN CALCIUM 80 MG/1
1 TABLET, FILM COATED ORAL
Qty: 0 | Refills: 0 | DISCHARGE

## 2019-08-28 RX ORDER — DONEPEZIL HYDROCHLORIDE 10 MG/1
1 TABLET, FILM COATED ORAL
Qty: 0 | Refills: 0 | DISCHARGE

## 2019-08-28 RX ORDER — ACETAMINOPHEN 500 MG
2 TABLET ORAL
Qty: 0 | Refills: 0 | DISCHARGE
Start: 2019-08-28

## 2019-08-28 RX ADMIN — ESCITALOPRAM OXALATE 20 MILLIGRAM(S): 10 TABLET, FILM COATED ORAL at 13:05

## 2019-08-28 RX ADMIN — Medication 2.5 MILLIGRAM(S): at 13:07

## 2019-08-28 RX ADMIN — PANTOPRAZOLE SODIUM 40 MILLIGRAM(S): 20 TABLET, DELAYED RELEASE ORAL at 05:13

## 2019-08-28 RX ADMIN — ENOXAPARIN SODIUM 40 MILLIGRAM(S): 100 INJECTION SUBCUTANEOUS at 13:05

## 2019-08-28 RX ADMIN — Medication 2 MILLIGRAM(S): at 13:05

## 2019-08-28 RX ADMIN — Medication 2 MILLIGRAM(S): at 05:13

## 2019-08-28 RX ADMIN — Medication 325 MILLIGRAM(S): at 13:05

## 2019-08-28 RX ADMIN — MEMANTINE HYDROCHLORIDE 10 MILLIGRAM(S): 10 TABLET ORAL at 13:05

## 2019-08-28 RX ADMIN — Medication 1 TABLET(S): at 13:05

## 2019-08-28 NOTE — PROGRESS NOTE ADULT - PROVIDER SPECIALTY LIST ADULT
Hospitalist
Palliative Care
Palliative Care
Hospitalist

## 2019-08-28 NOTE — DISCHARGE NOTE PROVIDER - INSTRUCTIONS
· Date	24-Aug-2019  · Recommended Consistencies	Downgrade diet to mechanical soft with nectar thick liquids  · Recommended Diagnostics	Reassess swallow at bedside  · Recommended Feeding/Eating Techniques	allow for swallow between intakes; crush medication (when feasible); oral hygiene; position upright (90 degrees); small sips/bites  · Feeding Assistance	frequent cues/help required  · Aspiration precautions	yes  · Monitor for Signs of Aspiration	change of breathing pattern; oral hygiene; position upright (90Y); cough; gurgly voice; fever; pneumonia; throat clearing; upper respiratory infection  DASh/TLC

## 2019-08-28 NOTE — PROGRESS NOTE ADULT - REASON FOR ADMISSION
EYAD BHATIA.
failure to thrive
failure to thrive

## 2019-08-28 NOTE — DISCHARGE NOTE NURSING/CASE MANAGEMENT/SOCIAL WORK - PATIENT PORTAL LINK FT
You can access the FollowMyHealth Patient Portal offered by [facility] by registering at the following website: [email or web address]. By joining [employer]’s FollowMyHealth portal, you will also be able to view your health information using other applications (apps) compatible with our system.

## 2019-08-28 NOTE — DISCHARGE NOTE PROVIDER - NSDCFUADDINST_GEN_ALL_CORE_FT
please see primary MD in 1 week of DC. bring all meds and discharge papers to all health care visits. return if symptoms recur or any new concerning symptoms

## 2019-08-28 NOTE — PROGRESS NOTE ADULT - ASSESSMENT
Pt is 87 y/o male with PMHx of HTN, HLD, CAD, GERD, Dementia, remote hx of prostate cancer, colon cancer 2 years ago s/p chemoradiation, was sent here from NH for PO oral intake and failure to thrive.  In ED labs grossly intact, CTH with no acute finding, UA negative, CXR suspected for metastatic malignancy. Pt admitted for failure to thrive with underlying worsening dementia and metastatic malignancy.  Palliative care consult appreciated -  MOLST completed DNR/I. Family meeting by palliative with 3 sons- please see [facility] conversation note by palliative on 8/26 for details-> Family not to pursue any further w/u for malignancy.     >Failure to thrive/dysphagia:  - Probably from underling dementia and metastatic cancer.   - Appreciate swallow eval - mech soft with nectar - need assistance for feeding  - Keep on fall / aspiration precaution, enhanced supervision.   - C/w Marinol, encourage PO intake. Nutritionist consult noted. Per RN pt eating well now- off IVFs.     - PT evaluation appreciated: [facility] vs [facility] depending on progress    >Metastatic colon cancer  - Palliative had meeting with 3 sons- family does not want to pursue further work up for underlying malignancy.   - Supportive measure.   - Palliative care following.   - DNR/ DNI.     >HTN- BP stable, Keep on Hydralazine prn.   >HLD- Lipitor  >Dementia- Fall/ aspiration precaution, enhanced supervision, C/w Home Aricept, Namenda.   >GERD- PPI  >CAD- Stable, C/w ASA/ Statins.   >Hx Prostate cancer- Treated per family.   >DVT ppx with Lovenox.   >Dispo- Pt from [facility]- Pt is . Family wants to explore alternate options. Dispo unclear at this time, PT eval--> AFL vs [facility]. Pt otherwise medically stable. [facility] CCM/ SW notified.

## 2019-08-28 NOTE — DISCHARGE NOTE PROVIDER - HOSPITAL COURSE
Pt is 89 y/o male with PMHx of HTN, HLD, CAD, GERD, Dementia, remote hx of prostate cancer, colon cancer 2 years ago s/p chemoradiation, was sent here from NH for PO oral intake and failure to thrive.  In ED labs grossly intact, CTH with no acute finding, UA negative, CXR suspected for metastatic malignancy. Pt admitted for failure to thrive with underlying worsening dementia and metastatic malignancy.  Palliative care consult appreciated -  MOLST completed DNR/I. Family meeting by palliative with 3 sons- please see [facility] conversation note by palliative on 8/26 for details-> Family not to pursue any further w/u for malignancy.         >Failure to thrive/dysphagia:    - Probably from underling dementia and metastatic cancer.     - Appreciate swallow eval - mech soft with nectar - need assistance for feeding    - Keep on fall / aspiration precaution, enhanced supervision.     - C/w Marinol, encourage PO intake. Nutritionist consult noted. Per RN pt eating well now- off IVFs.       - PT evaluation appreciated: [facility] vs AFL depending on progress. patient is stable for [facility]. Son picked up momentum, has bed today        >Metastatic colon cancer    - Palliative had meeting with 3 sons- family does not want to pursue further work up for underlying malignancy.     - Supportive measure.     - Palliative care following.     - DNR/ DNI.         >HTN- BP stable, Keep on Hydralazine prn.     >HLD- Lipitor    >Dementia- Fall/ aspiration precaution, enhanced supervision, C/w Home Aricept, Namenda.     >GERD- PPI    >CAD- Stable, C/w ASA/ Statins.     >Hx Prostate cancer- Treated per family.     >DVT ppx with Lovenox.         stable for DC to [facility].         Celina spent 40 min

## 2019-08-28 NOTE — PROGRESS NOTE ADULT - SUBJECTIVE AND OBJECTIVE BOX
VEENA BHATT Male 88y [id number]    Patient is a 88y old  Male who presents with a chief complaint of [facility] MD. (26 Aug 2019 16:45)      Subjective/objective:  seen and examined. chart reviewed. does not communicate. noded no when asked about pain or complaints.     PHYSICAL EXAM:  Vital Signs Last 24 Hrs  T(C): 36.5 (28 Aug 2019 08:30), Max: 37.7 (27 Aug 2019 23:43)  T(F): 97.7 (28 Aug 2019 08:30), Max: 99.8 (27 Aug 2019 23:43)  HR: 62 (28 Aug 2019 08:30) (62 - 86)  BP: 151/84 (28 Aug 2019 08:30) (120/63 - 151/84)  BP(mean): --  RR: 18 (28 Aug 2019 08:30) (17 - 18)  SpO2: 97% (28 Aug 2019 08:30) (92% - 97%)    CAPILLARY BLOOD GLUCOSE      I&O's Detail    27 Aug 2019 07:01  -  28 Aug 2019 07:00  --------------------------------------------------------  IN:    sodium chloride 0.9%: 300 mL  Total IN: 300 mL    OUT:  Total OUT: 0 mL    Total NET: 300 mL    GENERAL: Not in distress. Alert    HEENT:  Normocephalic and atraumatic.   NECK: Supple.   CARDIOVASCULAR: RRR S1, S2. No murmur/rubs/gallop  LUNGS: BLAE+, no rales, no wheezing, no rhonchi.    ABDOMEN: ND. Soft,  NT, no guarding / rebound / rigidity.   EXTREMITIES:  no edema. no leg or calf TP  SKIN: no rash.  NEUROLOGIC: confused. moved limbs  PSYCHIATRIC: Calm.  No agitation.    MEDICATIONS  (STANDING):  aspirin 325 milliGRAM(s) Oral daily  atorvastatin 20 milliGRAM(s) Oral at bedtime  donepezil 10 milliGRAM(s) Oral at bedtime  dronabinol 2.5 milliGRAM(s) Oral daily  enoxaparin Injectable 40 milliGRAM(s) SubCutaneous daily  escitalopram 20 milliGRAM(s) Oral daily  loperamide 2 milliGRAM(s) Oral three times a day  memantine 10 milliGRAM(s) Oral daily  multivitamin 1 Tablet(s) Oral daily  pantoprazole    Tablet 40 milliGRAM(s) Oral before breakfast  QUEtiapine 25 milliGRAM(s) Oral at bedtime    MEDICATIONS  (PRN):  acetaminophen   Tablet .. 650 milliGRAM(s) Oral every 6 hours PRN Temp greater or equal to 38C (100.4F), Mild Pain (1 - 3)  ALBUTerol/ipratropium for Nebulization 3 milliLiter(s) Nebulizer every 6 hours PRN Shortness of Breath  hydrALAZINE Injectable 10 milliGRAM(s) IV Push every 6 hours PRN SBP>175      LABS:    no lab today

## 2019-08-28 NOTE — DISCHARGE NOTE PROVIDER - NSDCCPCAREPLAN_GEN_ALL_CORE_FT
PRINCIPAL DISCHARGE DIAGNOSIS  Diagnosis: Failure to thrive in adult  Assessment and Plan of Treatment: - Probably from underling dementia and metastatic cancer.   - s/p swallow eval - mech soft with nectar - need assistance for feeding  - Keep on fall / aspiration precaution, enhanced supervision.   - C/w Marinol, encourage PO intake. Nutritionist consult noted. Per RN pt eating well now- off IVFs.  needs speech swallow reassessment in [facility] if possible      SECONDARY DISCHARGE DIAGNOSES  Diagnosis: Dementia  Assessment and Plan of Treatment: Fall/ aspiration precaution, enhanced supervision, C/w Home Aricept    Diagnosis: Hypertension  Assessment and Plan of Treatment: monitor BP off meds. DASh/TLC diet.    Diagnosis: Colon cancer  Assessment and Plan of Treatment: - Supportive measure.   - DNR/ DNI. PRINCIPAL DISCHARGE DIAGNOSIS  Diagnosis: Failure to thrive in adult  Assessment and Plan of Treatment: - Probably from underling dementia and metastatic cancer.   - s/p swallow eval - mech soft with nectar - need assistance for feeding  - Keep on fall / aspiration precaution, enhanced supervision.   - C/w Marinol, encourage PO intake. Nutritionist consult noted. Per RN pt eating well now- off IVFs.  needs speech swallow reassessment in [facility] if possible  ensure enclive TID      SECONDARY DISCHARGE DIAGNOSES  Diagnosis: Dementia  Assessment and Plan of Treatment: Fall/ aspiration precaution, enhanced supervision, C/w Home Aricept    Diagnosis: Hypertension  Assessment and Plan of Treatment: monitor BP off meds. DASh/TLC diet.    Diagnosis: Colon cancer  Assessment and Plan of Treatment: - Supportive measure.   - DNR/ DNI.

## 2019-09-29 PROCEDURE — 81001 URINALYSIS AUTO W/SCOPE: CPT

## 2019-09-29 PROCEDURE — 97116 GAIT TRAINING THERAPY: CPT

## 2019-09-29 PROCEDURE — 84100 ASSAY OF PHOSPHORUS: CPT

## 2019-09-29 PROCEDURE — 97530 THERAPEUTIC ACTIVITIES: CPT

## 2019-09-29 PROCEDURE — 87086 URINE CULTURE/COLONY COUNT: CPT

## 2019-09-29 PROCEDURE — 36415 COLL VENOUS BLD VENIPUNCTURE: CPT

## 2019-09-29 PROCEDURE — 71045 X-RAY EXAM CHEST 1 VIEW: CPT

## 2019-09-29 PROCEDURE — 85027 COMPLETE CBC AUTOMATED: CPT

## 2019-09-29 PROCEDURE — 97163 PT EVAL HIGH COMPLEX 45 MIN: CPT

## 2019-09-29 PROCEDURE — 96360 HYDRATION IV INFUSION INIT: CPT

## 2019-09-29 PROCEDURE — 92610 EVALUATE SWALLOWING FUNCTION: CPT

## 2019-09-29 PROCEDURE — 83735 ASSAY OF MAGNESIUM: CPT

## 2019-09-29 PROCEDURE — 99285 EMERGENCY DEPT VISIT HI MDM: CPT | Mod: 25

## 2019-09-29 PROCEDURE — 80053 COMPREHEN METABOLIC PANEL: CPT

## 2019-09-29 PROCEDURE — 70450 CT HEAD/BRAIN W/O DYE: CPT

## 2019-11-15 NOTE — PATIENT PROFILE ADULT. - TOBACCO USE
Patient's wife called stating that Bucky had fallen last night.  He was lying in bed and got up to go to the bathroom.  He felt dizzy and does remember falling.  Karly states that her  does drink plenty of fluids between his coffee, soda and water.  Patient was seen by Dr. 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  At this visit, he requested to stop taking his Zonisamide that is ordered by Dr. Nazario for migraines without headache and was given permission to taper off at that time.  Karly asked if Bucky should restart his Zonisamide because of this fall.  Writer reviewed Dr. Odonnell's last note where it was stated that the patient was the one requesting to come off of the medication.  Writer told Karly that this question would be relayed to Dr. Odonnell, but it may also be more appropriate to contact Dr. Nazario as he was the one ordering this medication.  Will call patient back if Dr. Odonnell has input to the Zonisamide.    Never smoker

## 2020-06-08 NOTE — ED PROVIDER NOTE - PSH
-Will obtain UA and UC today  -Patient aware that if their UA persistently reveals microscopic hematuria over the next 3-5 years, we will consider repeating the full hematuria workup once again at that time.  -PSA noted above  -Discussed with the patient in depth regarding the current AUA guidelines regarding PSA and prostate cancer screening.  Pt elects to hold on further PSA screening at this time.  -Will notify patient of results of his UA and urine culture, if microscopic hematuria persists patient aware we will pursue workup once again.  Otherwise, if his UA is unremarkable will follow-up with him in 1 year  -Patient verbalized understanding.   
S/P cataract extraction and insertion of intraocular lens, left    S/P cataract extraction and insertion of intraocular lens, right

## 2020-09-21 NOTE — PHYSICAL THERAPY INITIAL EVALUATION ADULT - DIAGNOSIS, PT EVAL
Detail Level: Detailed
Quality 431: Preventive Care And Screening: Unhealthy Alcohol Use - Screening: Patient screened for unhealthy alcohol use using a single question and scores less than 2 times per year
Quality 226: Preventive Care And Screening: Tobacco Use: Screening And Cessation Intervention: Patient screened for tobacco use and is an ex/non-smoker
Quality 130: Documentation Of Current Medications In The Medical Record: Current Medications Documented
gait disturbance due to decreased balance and cognition

## 2021-02-22 NOTE — ED ADULT NURSE NOTE - RESPIRATORY WDL
Breathing spontaneous and unlabored. Breath sounds clear and equal bilaterally with regular rhythm.
negative...

## 2021-04-08 NOTE — PATIENT PROFILE ADULT. - AS SC BRADEN MOBILITY
(3) slightly limited Dermal Autograft Text: The defect edges were debeveled with a #15 scalpel blade.  Given the location of the defect, shape of the defect and the proximity to free margins a dermal autograft was deemed most appropriate.  Using a sterile surgical marker, the primary defect shape was transferred to the donor site. The area thus outlined was incised deep to adipose tissue with a #15 scalpel blade.  The harvested graft was then trimmed of adipose and epidermal tissue until only dermis was left.  The skin graft was then placed in the primary defect and oriented appropriately.

## 2021-04-28 NOTE — ED PROVIDER NOTE - NS_EDPROVIDERDISPOUSERTYPE_ED_A_ED
170.18 Scribe Attestation (For Scribes USE Only)... Scribe Attestation (For Scribes USE Only).../Attending Attestation (For Attendings USE Only)...

## 2021-08-31 NOTE — ED ADULT NURSE REASSESSMENT NOTE - NURSING ED PRESSURE ULCER LOCATION 1
coccyx Azathioprine Pregnancy And Lactation Text: This medication is Pregnancy Category D and isn't considered safe during pregnancy. It is unknown if this medication is excreted in breast milk.

## 2021-10-06 NOTE — H&P ADULT - PROBLEM SELECTOR PROBLEM 3
Include Location In Plan?: Yes Hide Additional Notes?: No Detail Level: Generalized Detail Level: Zone Essential hypertension

## 2022-03-22 NOTE — PHYSICAL THERAPY INITIAL EVALUATION ADULT - CAPILLARY REFILL, RLE
Informed via mychat, discussed unsure if new pregnancy or retained products. Is currently spotting, denies pain/cramping/fever. Will return to clinic in 2-3 days for repeat beta hcg. Also [facility] and make follow up appt with them. We reviewed in a healthy pregnancy beta hcg will double in 48 hours, if retained products or hormones from termination will see decrease. Will call tomorrow to make lab only and call back with any questions or concerns.    PETER Shelby, CNM   less than/equal to 3 secs

## 2022-06-07 NOTE — PROGRESS NOTE ADULT - PROBLEM/PLAN-4
Detail Level: Simple
Price (Do Not Change): 0.00
Instructions: This plan will send the code FBSE to the PM system.  DO NOT or CHANGE the price.
DISPLAY PLAN FREE TEXT

## 2022-09-09 NOTE — PROGRESS NOTE ADULT - PROVIDER SPECIALTY LIST ADULT
Hospitalist
Spoke to Adrienne at [address] [facility] assisted living at [phone]. Orders given to her for Torsemide 20 mg BID x 5 days, then go to once daily. K+ 20 meq daily x 5 days, then go to 10 meq daily. Do BMP in 1 weeks. Orders faxed to her at [phone].
Hospitalist

## 2023-01-01 NOTE — H&P ADULT - HISTORY OF PRESENT ILLNESS
87 y/o male with h/o mild dementia and HTN, prostate cancer, who was previously full ambulating, was noticed by the family that he has facial droop and looks weak and unsteady 4 days ago. no headache, speech changes, or blurry vision. also frequent bowel movements with loose stools for the last2-3 weeks. no h/o Abx. o nausea, vomiting, or fever. CT brain shows no acute changes. Neurology consult Dr Valderrama was consulted who recommended MRI of the brain. No

## 2023-04-26 NOTE — ED PROCEDURE NOTE - CPROC ED SITE PREP1
04/26/23 0908   Attempts to Reach Patient   Which attempt is this? 1  (called patient today to review her medications as she was unable to do this yesterday. Pt stated that she does not feel well and cannot go over her medications at this time. I instructed pt to bring her medications in their prescription bottles.)        povidone iodine/chlorhexidine

## 2023-10-06 NOTE — H&P ADULT - ASSESSMENT
Pt is 89 y/o male with PMHx of HTN, HLD, CAD, GERD, Dementia, remote hx of prostate cancer, colon cancer 2 years ago s/p chemoradiation, was sent here from NH for PO oral intake and failure to thrive.  In ED labs grossly intact, CTH with no acute finding, UA negative, CXR suspected for metastatic malignancy. Pt admitted for failure to thrive with underlying worsening dementia and metastatic malignancy.    Impression:  - Failure to thrive  - Metastatic colon cancer  - Hx of prostate cancer  - Hx Of HTN / HLD/ CAD/ GERD/ Dementia.    Plan:  - Keep on fall / aspiration precaution, enhanced supervision.   - Will keep on IVFs, Marinol  - CXR shows metastatic malignancy- I discussed with Pt daughter-in-law at length, They do not want further work for mass- plan for supportive care- Pt's son to discuss more about it. Palliative care consult.  - Resume o/p meds as reconciled.
show

## 2024-03-28 NOTE — PATIENT PROFILE ADULT. - NS SC CAGE ALCOHOL CUT DOWN
Internal Medicine Progress Note      Subjective:    Patienmt examined. Patient has nose bleed today.    Review of Systems  Negative for all 10 systems except as per subjective    I/O's    Intake/Output Summary (Last 24 hours) at 3/27/2024 2104  Last data filed at 3/27/2024 2000  Gross per 24 hour   Intake 1458.75 ml   Output 800 ml   Net 658.75 ml         ALLERGIES:  Patient has no known allergies.     Hospital Meds  Current Facility-Administered Medications   Medication Dose Route Frequency Provider Last Rate Last Admin    sodium chloride 0.9% infusion   Intravenous Continuous PRN Isaias Suarez, CNP        sodium chloride 0.9% infusion   Intravenous Continuous PRN Isaias Suarez, CNP        [START ON 3/28/2024] bacitracin ointment   Topical BID Jil Ann PA-C        HYDROcodone-acetaminophen (NORCO)  MG per tablet 1 tablet  1 tablet Oral BID WC Shayla Mosley, APNP   1 tablet at 03/27/24 1204    polyethylene glycol (MIRALAX) packet 17 g  17 g Oral Daily Shayla Mosley, APNP        docusate sodium-sennosides (SENOKOT S) 50-8.6 MG 2 tablet  2 tablet Oral Nightly Shayla Mosley, APNP   2 tablet at 03/27/24 2038    lidocaine (XYLOCAINE) 5 % ointment   Topical BID Shayla Mosley, APNP   Given at 03/27/24 2039    oxymetazoline (AFRIN) 0.05 % nasal spray 2 spray  2 spray Each Nare BID PRN Isaias Suarez CNP   2 spray at 03/26/24 1738    atorvastatin (LIPITOR) tablet 40 mg  40 mg Oral Nightly Kukla, Adrianna, CNP   40 mg at 03/27/24 2038    AMIODarone (PACERONE) tablet 200 mg  200 mg Oral Daily Kukla, Adrianna, CNP   200 mg at 03/27/24 0906    [Held by provider] clopidogrel (PLAVIX) tablet 75 mg  75 mg Oral Daily Kukla, Adrianna, CNP   75 mg at 03/25/24 1057    furosemide (LASIX) tablet 40 mg  40 mg Oral Daily Kukla, Adrianna, CNP   40 mg at 03/27/24 0907    pantoprazole (PROTONIX) EC tablet 40 mg  40 mg Oral QAM AC Nedkla, Adrianna, CNP   40 mg at 03/27/24 0550    dextrose (GLUTOSE) 40 % gel 15 g  15 g Oral PRN Tyesha,  KAREN Rodas        dextrose (GLUTOSE) 40 % gel 30 g  30 g Oral PRN Adrianna Arambula CNP        acetaminophen (TYLENOL) tablet 650 mg  650 mg Oral Q6H PRN Adrianna Arambula, CNP        Or    acetaminophen (TYLENOL) suppository 650 mg  650 mg Rectal Q6H PRN Adrianna Arambula, CNP        HYDROcodone-acetaminophen (NORCO)  MG per tablet 1 tablet  1 tablet Oral Q4H PRN Adrianna Arambula, CNP        HYDROcodone-acetaminophen (NORCO) 5-325 MG per tablet 1 tablet  1 tablet Oral Q4H PRN Adrianna Arambula, CNP   1 tablet at 03/26/24 1125    magnesium hydroxide (MILK OF MAGNESIA) 400 MG/5ML suspension 30 mL  30 mL Oral Q12H PRN Adrianna Arambula CNP        hydrALAZINE (APRESOLINE) tablet 25 mg  25 mg Oral TID Gabe Acosta, DO   25 mg at 03/27/24 2038    insulin lispro (ADMELOG,HumaLOG) - Correction Dose   Subcutaneous BID AC Juan Long MD        albuterol inhaler 2 puff  2 puff Inhalation Q6H Resp PRN Juan Long MD        melatonin tablet 6 mg  6 mg Oral Nightly PRN Jenise Espana APNP   6 mg at 03/25/24 2057    sodium chloride 0.9 % flush bag 25 mL  25 mL Intravenous PRN Isaias Suarez CNP        Potassium Standard Replacement Protocol (Levels 3.5 and lower)   Does not apply See Admin Instructions Isaias Suarez CNP        Potassium Replacement (Levels 3.6 - 4)   Does not apply See Admin Instructions Isaias Suarez CNP        Magnesium Standard Replacement Protocol   Does not apply See Admin Instructions Isaias Suarez CNP        ondansetron (ZOFRAN ODT) disintegrating tablet 4 mg  4 mg Oral Q12H PRN Agueda Roberts PA-C        Or    ondansetron (ZOFRAN) injection 4 mg  4 mg Intravenous Q12H PRN Agueda Roberts PA-C        bisacodyl (DULCOLAX) suppository 10 mg  10 mg Rectal Daily PRN Agueda Roberts PA-C   10 mg at 03/10/24 1702    dextrose 50 % injection 25 g  25 g Intravenous PRN Agueda Roberts PA-C        dextrose 50 % injection 12.5 g  12.5 g Intravenous PRN Agueda Roberts PA-C        glucagon (GLUCAGEN)  injection 1 mg  1 mg Intramuscular LIBORION Agueda Roberts PA-C            Last Recorded Vitals  Visit Vitals  /72 (BP Location: LUE - Left upper extremity, Patient Position: Semi-Joseph's)   Pulse 78   Temp 97.5 °F (36.4 °C) (Oral)   Resp 19   Ht 5' 10\" (1.778 m)   Wt 95.5 kg (210 lb 8.6 oz)   SpO2 99%   BMI 30.21 kg/m²         SpO2 Readings from Last 3 Encounters:   03/27/24 99%   09/04/22 100%   12/16/20 100%        General:  Alert and oriented.  Not in acute distress.  Skin:  Warm and dry without rash.    Head:  Normocephalic-atraumatic.   Neck:  Trachea is midline. No adenopathy.  Normal thyroid without mass or tenderness..  Eyes:  Normal conjunctivae and sclerae.  Pupils equal, round, reactive to light.  Extraocular movements intact.    ENT:   Mucous membranes are moist.  Normal tympanic membranes and external auditory canals bilaterally.  No pharyngeal erythema or exudate.  No facial tenderness.  Normal nasal mucosa.  Cardiovascular:  Symmetrical pulses.  Regular rate and rhythm without murmur.  Respiratory:   Normal respiratory effort.  Clear to auscultation.  No wheezes, rales or rhonchi.  Gastrointestinal:  Soft and nontender.  Normal bowel sounds.  No hepatomegaly or splenomegaly.  No guarding or rebound tenderness.  No masses.  Musculoskeletal:  No deformity or edema.  No tenderness to palpation.    Back:   Normal alignment.  No costovertebral angle tenderness or midline bony tenderness.  Neurologic:   Oriented times 4.  Cranial nerves 2-12 are intact.  No nystagmus.  No focal deficits or lateralizing signs.  Normal gait without ataxia.  Psychiatric:   Cooperative.  Appropriate mood and affect.  Normal judgment.    Labs     Recent Results (from the past 24 hour(s))   Prothrombin Time (INR/PT)    Collection Time: 03/27/24  5:24 AM   Result Value Ref Range    Protime- PT 33.2 (H) 9.7 - 11.8 sec    INR 3.3     Magnesium    Collection Time: 03/27/24  5:24 AM   Result Value Ref Range    Magnesium 2.3 1.7 -  2.4 mg/dL   NT proBNP    Collection Time: 03/27/24  5:24 AM   Result Value Ref Range    NT-proBNP 3,445 (H) <=125 pg/mL   Comprehensive Metabolic Panel    Collection Time: 03/27/24  5:24 AM   Result Value Ref Range    Fasting Status      Sodium 137 135 - 145 mmol/L    Potassium 4.0 3.4 - 5.1 mmol/L    Chloride 106 97 - 110 mmol/L    Carbon Dioxide 20 (L) 21 - 32 mmol/L    Anion Gap 15 7 - 19 mmol/L    Glucose 93 70 - 99 mg/dL    BUN 29 (H) 6 - 20 mg/dL    Creatinine 1.19 (H) 0.67 - 1.17 mg/dL    Glomerular Filtration Rate 69 >=60    BUN/Cr 24 7 - 25    Calcium 9.3 8.4 - 10.2 mg/dL    Bilirubin, Total 0.8 0.2 - 1.0 mg/dL    GOT/AST 55 (H) <=37 Units/L    GPT/ALT 82 (H) <64 Units/L    Alkaline Phosphatase 97 45 - 117 Units/L    Albumin 2.4 (L) 3.6 - 5.1 g/dL    Protein, Total 8.5 (H) 6.4 - 8.2 g/dL    Globulin 6.1 (H) 2.0 - 4.0 g/dL    A/G Ratio 0.4 (L) 1.0 - 2.4   Lactate Dehydrogenase    Collection Time: 03/27/24  5:24 AM   Result Value Ref Range    LD, Total 298 (H) 86 - 234 Units/L   CBC with Automated Differential (performable only)    Collection Time: 03/27/24  5:24 AM   Result Value Ref Range    WBC 11.4 (H) 4.2 - 11.0 K/mcL    RBC 4.16 (L) 4.50 - 5.90 mil/mcL    HGB 11.0 (L) 13.0 - 17.0 g/dL    HCT 34.6 (L) 39.0 - 51.0 %    MCV 83.2 78.0 - 100.0 fl    MCH 26.4 26.0 - 34.0 pg    MCHC 31.8 (L) 32.0 - 36.5 g/dL    RDW-CV 16.9 (H) 11.0 - 15.0 %    RDW-SD 51.4 (H) 39.0 - 50.0 fL     140 - 450 K/mcL    NRBC 0 <=0 /100 WBC    Neutrophil, Percent 52 %    Lymphocytes, Percent 36 %    Mono, Percent 9 %    Eosinophils, Percent 2 %    Basophils, Percent 1 %    Immature Granulocytes 0 %    Absolute Neutrophils 5.8 1.8 - 7.7 K/mcL    Absolute Lymphocytes 4.1 (H) 1.0 - 4.0 K/mcL    Absolute Monocytes 1.0 (H) 0.3 - 0.9 K/mcL    Absolute Eosinophils  0.3 0.0 - 0.5 K/mcL    Absolute Basophils 0.1 0.0 - 0.3 K/mcL    Absolute Immature Granulocytes 0.1 0.0 - 0.2 K/mcL   GLUCOSE, BEDSIDE - POINT OF CARE    Collection Time:  03/27/24  7:47 AM   Result Value Ref Range    GLUCOSE, BEDSIDE - POINT OF CARE 95 70 - 99 mg/dL   GLUCOSE, BEDSIDE - POINT OF CARE    Collection Time: 03/27/24 10:50 AM   Result Value Ref Range    GLUCOSE, BEDSIDE - POINT OF CARE 108 (H) 70 - 99 mg/dL   Prepare Fresh Frozen Plasma: 1 Units    Collection Time: 03/27/24 12:51 PM   Result Value Ref Range    UNIT BLOOD TYPE A Pos     ISBT BLOOD TYPE 6200     BLOOD EXPIRATION DATE 74912328879341     UNIT NUMBER [id number]     DISPENSE STATUS Issued     PRODUCT ID Fresh Frozen Plasma     PRODUCT CODE R2163X54     PRODUCT DESCRIPTION FFP ACD-A     ISSUE DATE/TIME [id number]    TYPE/SCREEN    Collection Time: 03/27/24 12:56 PM   Result Value Ref Range    ABO/RH(D) O Rh Positive     ANTIBODY SCREEN Negative     TYPE AND SCREEN EXPIRATION DATE 03/30/2024 23:59    Prepare Fresh Frozen Plasma: 1 Units    Collection Time: 03/27/24  1:05 PM   Result Value Ref Range    UNIT BLOOD TYPE O Pos     ISBT BLOOD TYPE 5100     BLOOD EXPIRATION DATE 88165344982140     UNIT NUMBER [id number]     DISPENSE STATUS Issued     PRODUCT ID Fresh Frozen Plasma     PRODUCT CODE [id number]     PRODUCT DESCRIPTION FFP ACD-A     ISSUE DATE/TIME [id number]    GLUCOSE, BEDSIDE - POINT OF CARE    Collection Time: 03/27/24  4:07 PM   Result Value Ref Range    GLUCOSE, BEDSIDE - POINT OF CARE 105 (H) 70 - 99 mg/dL   Prothrombin Time (INR/PT)    Collection Time: 03/27/24  7:02 PM   Result Value Ref Range    Protime- PT 30.5 (H) 9.7 - 11.8 sec    INR 3.0         Imaging    XR ABDOMEN 1 VIEW   Final Result   1.  Suspected feeding tube in the esophagus.  Repositioning and reimaging    recommended.      2.  Nonobstructed bowel gas pattern.  Moderate fecal retention, correlate    for constipation.            Electronically signed by Markell Patel MD on 03 23 24 at 02:15      XR ABDOMEN 1 VIEW   Final Result   1.  Feeding tube terminates in the stomach however, is kinked.  Consider    mild advancement  and reimaging.      2.  Remaining exam is within normal limits.            Electronically signed by Markell Patel MD on 03 23 24 at 02:16      XR CHEST AP OR PA   Final Result   Impression:       Stable exam as above. Support devices as above.      Electronically Signed by: DAVID GARCIA MD    Signed on: 3/22/2024 9:31 AM    Workstation ID: [id number]      XR CHEST PA OR AP 1 VIEW   Final Result   Impression:       Stable exam as above. Support devices as above.      Electronically Signed by: DAVID GARCIA MD    Signed on: 3/21/2024 7:44 AM    Workstation ID: [id number]      XR CHEST PA OR AP 1 VIEW   Final Result   Impression:   Stable right lower lobe and developing left lung airspace disease.      No pneumothorax.       Electronically Signed by: ISIDRA ODOM    Signed on: 3/20/2024 5:55 AM    Workstation ID: [id number]      XR CHEST PA OR AP 1 VIEW   Final Result   Impression:    Stable exam. No acute cardiopulmonary abnormality.      Electronically Signed by: ANISHA RANDALL MD    Signed on: 3/19/2024 8:14 AM    Workstation ID: [id number]      XR CHEST PA OR AP 1 VIEW   Final Result   1.   No significant change.      Electronically Signed by: LEFTY MCDONALD MD    Signed on: 3/18/2024 7:38 AM    Workstation ID: [id number]      XR ABDOMEN 1 VIEW   Final Result   FINDINGS/IMPRESSION:      Enteric tube terminates within the stomach.      Visualized bowel gas pattern is within normal limits.  No large   intraperitoneal free air.                Electronically Signed by: DAVE GARCIA M.D.    Signed on: 3/17/2024 4:40 PM    Workstation ID: [id number]      XR CHEST PA OR AP 1 VIEW   Final Result   Impression:   No significant changes compared to yesterday.      LVAD device overlies left ventricular apex.      Lung findings may represent pneumonia.       Electronically Signed by: ISIDRA ODOM    Signed on: 3/17/2024 6:08 AM    Workstation ID: [id number]      XR CHEST PA OR AP 1 VIEW    Final Result   Impression:   No significant changes compared to prior examination.      Stable bilateral lung opacities, likely pneumonia.       Electronically Signed by: BETTYCONSTANTINOHAYDEN BARBOSADARREL    Signed on: 3/16/2024 8:46 AM    Workstation ID: [id number]      FL VIDEO SWALLOW   Final Result   FINDINGS/IMPRESSION:   Enteric feeding tube in place   There may be one episode of silent aspiration which is under investigation   by the speech pathology department   Limited visualized cervical esophagus demonstrates no constricting or   obstructing or concerning mucosal abnormality.      PLEASE SEE DETAILED SPEECH PATHOLOGY DEPARTMENT REPORT FOR RECOMMENDATIONS            Electronically Signed by: BRONSON FOX MD    Signed on: 3/15/2024 2:10 PM    Workstation ID: [id number]      XR CHEST PA OR AP 1 VIEW   Final Result   Impression:   Stable exam as above. Support devices as above.      Electronically Signed by: YOLI MADISON MD    Signed on: 3/15/2024 7:43 AM    Workstation ID: [id number]      XR CHEST PA OR AP 1 VIEW   Final Result      XR CHEST AP OR PA   Final Result   Impression:    Cardiomegaly with unchanged hilar vascular congestion and perihilar edema.   No new or worsening airspace disease.      Electronically Signed by: ANISHA RANDALL MD    Signed on: 3/13/2024 1:08 PM    Workstation ID: [id number]      XR CHEST PA OR AP 1 VIEW   Final Result      Decreased bilateral lung opacities.      Electronically Signed by: MIHIR RPO MD    Signed on: 3/13/2024 8:10 AM    Workstation ID: [id number]      XR CHEST PA OR AP 1 VIEW   Final Result   Impression:   Stable exam as above. Support devices as above.      Electronically Signed by: YOLI MADISON MD    Signed on: 3/12/2024 12:13 PM    Workstation ID: [id number]      XR CHEST PA OR AP 1 VIEW   Final Result   Impression:       Stable exam as above. Support devices as above.      Electronically Signed by: DAVID GARCIA MD    Signed on: 3/12/2024  8:17 AM    Workstation ID: [id number]      XR CHEST AP OR PA   Final Result   Impression:   Cardiomediastinal silhouette is in the upper limits of normal but grossly   stable. NG tube projects in the abdomen region of the stomach but stable.   LVAD is stable. There left subclavian catheter/[place]-Vandana in the pulmonary   outflow tract. Mediastinal drain and left chest tube are stable. Right   chest tube has been removed. Mild patchy airspace opacification. No   pneumothorax.      Remainder of the exam is otherwise stable.               Electronically Signed by: ANGY FREEDMAN MD    Signed on: 3/11/2024 4:53 PM    Workstation ID: [id number]      XR CHEST PA OR AP 1 VIEW   Final Result      XR CHEST PA OR AP 1 VIEW   Final Result   Impression:   Stable exam as above. Support devices as above.      Electronically Signed by: YOLI MADISON MD    Signed on: 3/10/2024 6:33 AM    Workstation ID: [id number]      XR CHEST PA OR AP 1 VIEW   Final Result   Impression:   Stable position of support lines and tubes.      Resolving lung opacities, likely resolving atelectasis/pulmonary vascular   congestion.       Electronically Signed by: ISIDRA ODOM    Signed on: 3/9/2024 7:15 AM    Workstation ID: [id number]      XR CHEST PA OR AP 1 VIEW   Final Result   Impression:   Stable exam as above. Support devices as above.      Electronically Signed by: YOLI MADISON MD    Signed on: 3/8/2024 7:45 AM    Workstation ID: [id number]      XR CHEST PA OR AP 1 VIEW   Final Result      1.   Expected median sternotomy changes with lines and tubes as above.   2.   No pneumothorax or focal pulmonary consolidation.      Electronically Signed by: MIHIR PRO MD    Signed on: 3/7/2024 5:12 PM    Workstation ID: [id number]      XR CHEST AP OR PA   Final Result   Impression:    Stable exam as above. Support devices as above.      Electronically Signed by: ANISHA GUNN MD    Signed on: 3/7/2024 7:51 AM    Workstation ID:  [id number]      XR CHEST AP OR PA   Final Result   Impression:   No significant changes compared to prior examination.      Mild pulmonary vascular congestion.      Stable position of support lines and tubes.       Electronically Signed by: ISIDRA ODOM    Signed on: 3/6/2024 5:40 AM    Workstation ID: [email or web address]      XR CHEST AP OR PA   Final Result      XR CHEST AP OR PA   Final Result   Impression:   Stable exam as above. Support devices as above.      Electronically Signed by: YOLI MADISON MD    Signed on: 3/4/2024 7:24 AM    Workstation ID: [id number]      XR CHEST AP OR PA   Final Result   Impression:   Endotracheal tube not present. Stable position of support lines and tubes   compared to examination from yesterday.      Near complete resolution of pulmonary vascular congestion.       Electronically Signed by: ISIDRA ODOM    Signed on: 3/3/2024 6:03 AM    Workstation ID: [id number]      XR CHEST AP OR PA   Final Result   Mild cardiomegaly with central pulmonary vascular prominence and stable   pleural-parenchymal opacities throughout both lung fields.         Electronically Signed by: MEAGAN LEAL DO    Signed on: 3/2/2024 8:43 AM    Workstation ID: [id number]      US Ankle/Brachial 1 or 2 Level Extremity   Final Result   Normal ABIs of the bilateral lower extremities and right digit. Unable able   to obtained left digital PLACIDO.         Electronically Signed by: MEAGAN LEAL DO    Signed on: 3/2/2024 7:46 AM    Workstation ID: [id number]      XRAY OUTSIDE STUDY   Final Result      CT OUTSIDE STUDY   Final Result      XR CHEST AP OR PA   Final Result   Impression:    Stable exam as above. Support devices as above.      Electronically Signed by: ANISHA GUNN MD    Signed on: 3/1/2024 7:50 AM    Workstation ID: [id number]      CT CHEST ILD HIGH RESOLUTION WO CONTRAST   Final Result   Impression:      1. Cardiomegaly with moderate pericardial effusion and cardiac left    ventricular device in place.   2. Small right effusion with minimal improvement in bilateral airspace   disease.   3. No new consolidation or mass identified         Electronically Signed by: VALE VERA MD    Signed on: 2/29/2024 11:20 AM    Workstation ID: [id number]      XR CHEST AP OR PA   Final Result      XR CHEST AP OR PA   Final Result   Impression:   Stable exam as above. Support devices as above.      Electronically Signed by: YOLI MADISON MD    Signed on: 2/28/2024 7:16 AM    Workstation ID: [id number]      XR CHEST AP OR PA   Final Result   Interval [place]-Vandana removal and left PICC line placement. Otherwise, no   significant change from prior exam.                  Electronically Signed by: AMY ASHER MD    Signed on: 2/27/2024 11:39 PM    Workstation ID: [id number]      US ABDOMEN COMPLETE   Final Result   Cholelithiasis without sonographic evidence of acute cholecystitis.   Otherwise unremarkable examination of the abdomen.            Electronically Signed by: ANISHA GUNN MD    Signed on: 2/27/2024 8:55 AM    Workstation ID: [id number]      US VASC CAROTID DUPLEX BILATERAL   Final Result   RIGHT CAROTID SYSTEM:   Minimal atherosclerotic plaque, categorized as less than 50% internal   carotid artery stenosis      VERTEBRAL ARTERY SYSTEM   Normally anterograde of the right vertebral artery         ____________________________________________      [facility] UNIFORM INTERPRETATION CRITERIA* (2023)   Normal -   no sonographically visible plaque or intimal thickening and no   velocity elevation   <50% stenosis -   PSV <180 cm/s, with visible atherosclerotic plaque with   volume estimate <= 50%   50-69% stenosis -    - 230 cm/s, with visible plaque estimate at   least 50%   50-69%, alternate -   PSV<180 cm/s, but with >50% plaque and ratio >2.0 and   spectral broadening   >70% stenosis -   PSV >230 cm/s, with plaque estimate >= 50%   >80% stenosis -   >70% parameters, and with  EDV >140 cm/s   Near occlusion -   markedly narrowed ICA lumen with variably high, low, or   undetectable PSV   Total occlusion -   no detectable patent lumen and no flow with spectral,   power, and color Doppler US      * valid for native bifurcation and ICA disease only   * do not apply with endarterectomy, stent, CCA stenosis, dissection, FMD,   or global waveform abnormalities      Full MW interpretation criteria available at:   [email or web address]   [facility]%20Uniform%20Interpretation%20Vascular%20Imaging%20Criteria.pdf?[email or web address]   [email or web address]            Electronically Signed by: ANISHA RANDALL MD    Signed on: 2/27/2024 8:47 AM    Workstation ID: [id number]      XR CHEST AP OR PA   Final Result   Impression:   Stable exam as above. Support devices as above.      Electronically Signed by: OYLI MADISON MD    Signed on: 2/27/2024 7:22 AM    Workstation ID: [id number]      CT CHEST ABDOMEN PELVIS WO CONTRAST   Final Result   Infiltration along the right chest wall noted. There is a right axillary   hematoma measuring 4.7 x 6.7 cm on image 33. This is incompletely   evaluated. Overlying skin staples noted in the right subclavian region.      Right groin hematoma and infiltration noted.      Multiple prominent and mildly enlarged mediastinal lymph nodes are seen.       Mild to Moderate pericardial effusion.       Patchy airspace opacities in bilateral lungs noted. This may represent   edema versus infection.       No hydronephrosis. Mild hyperdensity of the kidneys noted. This may be from   recent angiogram. Possibility of contrast nephrotoxicity cannot be   excluded.      Moderate to large hiatal hernia.         The findings were communicated by telephone to VINOD Polanco by Dr. LEFTY MCDONALD MD at 2/26/2024 6:19 PM.            Electronically Signed by: LEFTY MCDONALD MD    Signed on: 2/26/2024 6:21 PM    Workstation ID: [id number]      CT HEAD WO CONTRAST    Final Result      No evidence of acute intracranial hemorrhage, hydrocephalus, or midline   shift.  Mild to moderate generalized atrophy. Mild periventricular and   subcortical white matter chronic small vessel ischemic changes. 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 Vascular   calcifications. Mild cerebellar atrophy.         Electronically Signed by: LEFTY MCDONALD MD    Signed on: 2/26/2024 5:41 PM    Workstation ID: [id number]      CT MANDIBLE WO CONTRAST   Final Result      No evidence of acute intracranial hemorrhage, hydrocephalus, or midline   shift.  Mild to moderate generalized atrophy. Mild periventricular and   subcortical white matter chronic small vessel ischemic changes. 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 Vascular   calcifications. Mild cerebellar atrophy.         Electronically Signed by: LEFTY MCDONALD MD    Signed on: 2/26/2024 5:41 PM    Workstation ID: [id number]      XR CHEST AP OR PA   Final Result      No acute change in cardiopulmonary appearance as described. Devices as   above.      Electronically Signed by: VALE SALAS M.D.    Signed on: 2/26/2024 7:13 AM    Workstation ID: [id number]      XR CHEST AP OR PA   Final Result   Impression:    Increasing perihilar pulmonary edema.      Electronically Signed by: ANISHA RANDALL MD    Signed on: 2/25/2024 9:19 AM    Workstation ID: [id number]      XR CHEST AP OR PA   Final Result   Impression:    Stable vascular congestion and central interstitial edema.   Supporting lines as above.      Electronically Signed by: ANISHA RANDALL MD    Signed on: 2/24/2024 8:00 AM    Workstation ID: [id number]      XR ABDOMEN 1 VIEW   Final Result   FINDINGS/IMPRESSION:        Supine exam of the upper abdomenwas performed. Enteric tube terminates in   the proximal stomach. The bowel gas pattern is nonspecific for obstruction.      See separately dictated chest radiograph for findings above the  diaphragm.         Electronically Signed by: DAVID GARCIA MD    Signed on: 2/23/2024 5:04 PM    Workstation ID: [id number]      XR CHEST AP OR PA   Final Result      XR ABDOMEN 1 VIEW   Final Result   FINDINGS/IMPRESSION:        Semiupright exam of the upper abdomen was performed. Transesophageal   enteric tube overlies the stomach. The bowel gas pattern is nonspecific for   obstruction.          Electronically Signed by: ANISHA GUNN MD    Signed on: 2/22/2024 10:31 AM    Workstation ID: [id number]      XR CHEST AP OR PA   Final Result   Impression:    Right internal jugular [place]-Vandana catheter projects further into the right   upper lobe pulmonary artery. Consider retracting by approximately 4 cm.   Otherwise stable exam.      Electronically Signed by: ANISHA GUNN MD    Signed on: 2/22/2024 7:37 AM    Workstation ID: [id number]      XR CHEST AP OR PA   Final Result   FINDINGS / IMPRESSION:      The endotracheal tube ends in the mid thoracic trachea about 5.1 cm above   the felice. The right internal jugular approach [place]-Vandana catheter likely   ends in the right pulmonary artery proximal upper lobe branch. The   intra-aortic balloon pump is no longer seen. No significant pulmonary   consolidation, pleural effusion, or pneumothorax is seen. The heart and   mediastinum appear stable.      Electronically Signed by: ESTEFANY CINTRON MD    Signed on: 2/21/2024 3:34 PM    Workstation ID: [id number]      FL INTRAOPERATIVE C ARM NO REPORT   Final Result      XR CHEST AP OR PA   Final Result   Impression:    Endotracheal tube has been removed. Slight inferior migration of the   intra-aortic balloon pump marker projecting at the level of T8-T9,   previously T7. Slightly increased left suprahilar interstitial opacities,   may represent asymmetric pulmonary edema.      Electronically Signed by: ANISHA GUNN MD    Signed on: 2/21/2024 11:13 AM    Workstation ID: [id number]      XR CHEST AP OR PA   Final  Result      Stable perihilar hazy opacities. Devices as above.       Electronically Signed by: VALE SALAS M.D.    Signed on: 2/20/2024 3:45 PM    Workstation ID: [id number]      XR CHEST AP OR PA   Final Result   Impression:   IABP marker overlies descending aorta, about 6.5 cm below the top of the   aortic arch      Lung findings may represent pulmonary vascular congestion or atypical   infiltrates..      Electronically Signed by: ISIDRA BARBOSADARREL    Signed on: 2/20/2024 1:48 PM    Workstation ID: [id number]      Cath/PV Case   Final Result      XR CHEST AP OR PA   Final Result      XR CHEST AP OR PA   Final Result      Trend towards improving subtle diffuse interstitial opacities in both   lungs. [place]-Vandana catheter as above.       Electronically Signed by: VALE SALAS M.D.    Signed on: 2/20/2024 10:05 AM    Workstation ID: [id number]      XR CHEST AP OR PA   Final Result   Stable support devices.  No significant change from prior exam.                  Electronically Signed by: AMY ASHER MD    Signed on: 2/19/2024 9:13 AM    Workstation ID: [id number]      US VASC EXTREMITY LOWER VENOUS DUPLEX   Final Result      Limited evaluation of the right lower extremity as above. No evidence of   acute DVT of the adequately visualized bilateral lower extremities as   above.            Electronically Signed by: YOLI MADISON MD    Signed on: 2/18/2024 10:01 AM    Workstation ID: [id number]      XR CHEST AP OR PA   Final Result   Impression:   Worsening findings of edema. PA catheter appears looped in the right atrium   and IABP overlies the mid to distal descending aorta. Subsequent radiograph   demonstrated repositioning of the IABP.      Electronically Signed by: YOLI MADISON MD    Signed on: 2/18/2024 9:27 AM    Workstation ID: [id number]      XR CHEST AP OR PA   Final Result   Impression:   Improvement of edema pattern. Support devices as above.      Electronically Signed by: YOLI MADISON MD     Signed on: 2/18/2024 9:25 AM    Workstation ID: [id number]      XR CHEST AP OR PA   Final Result   No significant interval change.               Electronically Signed by: Wayne Anna MD    Signed on: 2/18/2024 2:31 AM    Workstation ID: [id number]      XR CHEST AP OR PA   Final Result   Impression:    Stable exam as above. Support devices as above.      Electronically Signed by: ANISHA GUNN MD    Signed on: 2/17/2024 9:48 AM    Workstation ID: [id number]      XR CHEST AP OR PA   Final Result   No significant interval change.               Electronically Signed by: Wayne Anna MD    Signed on: 2/17/2024 12:07 AM    Workstation ID: [id number]      Cath/PV Case   Final Result      CTA CHEST PULMONARY EMBOLISM   Final Result      X-RAY CHEST 1 VW   Final Result      XR CHEST AP OR PA    (Results Pending)       Cultures  Microbiology Results       None               Assessment/Plan:    Principal Problem:    NSTEMI (non-ST elevated myocardial infarction) (CMD)  Active Problems:    Acute systolic heart failure (CMD)    Primary hypertension    Acute hypoxemic respiratory failure (CMD)    Pneumonia of both lower lobes due to infectious organism    Stage 3a chronic kidney disease (CMD)    Tobacco abuse       Nose bleed,  ENT consultant had seen her. Nasal pack recommended. Long discussion with patient. Initially he refused to have pack, Finally he agreed to have a nasal pack. Procedure scheduled for tomorrow..     Coagulopathy  FFP given yesterday and today.      Ischemic Cardiomyopathy  Carddiogenic shock secondary to late-presenting NSTEMI   Initially treated with inotrope, IABP and Impella. He is s/p LVAD. Cardiology and surgical reports reviewed. LVAD parameters are being adjusted. EF 15% on 2/16/24. On IV infusion of Furosemide dose was reduced secondary to  elevated Creatinine.       Pain management consult is in case.      S/P blood transfusion      Recurrent VT  Cardioverted multiple times.  However none for the last 3 days     Coronary artery disease  S/P NSTEMI, s/p stent X 3.     Pneumonia  Completed antibiotics. Some haziness in the lower lung field, stable. Discussed with Pulmonology.      Abnormal thyroid function  Normal TSH with low FT3.. on Amiodarone, endocrine report reviewed, recommending TFT joseph 3 months.      Prediabetes  HgbA1c 6.2., check glucose 2 x per day,, if glucose above 150, will escalate  care     Acute hypoxic respiratory failure  Secondary to CHF, He was weaned off. On Ventilator pot op. Weaned off tnasal cannula today.      Hypertension  On Beta blocker (Carvrdilol) and Losartan      Acute kidney injury   He has chronic kidney disease with acute worsening of creatinine. Nephrology report reviewed. Diuretics adjusted. Now on oral furosemide. Doing well. Discussed with Nephrology.      Insomnia  Not on medications due to excessive sleepiness.      Tobacco abuse     Debility  Continue PT and OT            Primary Care Physician  Juan Long MD    Code Status    Code Status: Full Resuscitation    Juan Long MD  3/27/2024 9:04 PM         no

## 2025-03-18 NOTE — DISCHARGE NOTE ADULT - NURSING SECTION COMPLETE
GYNECOLOGY VISIT    Ms. Soriano is a 43 year old  who presents for consultation due to abnormal ultrasound finding and pelvic cramping. Patient has a diagnosis of PCOS and had an ultrasound ordered by her endocrinologist. She reports that she has been experiencing pelvic cramping for the past few months. Describes cramping that starts before her periods and occurs intermittently following cessation of her menses. She has not tried medical pain management. Patient reports that she was diagnosed with a fibroid in  and she has never had any issues  and denies AUB. Her only other concern is that she has chronic UTIs. She reports being treated for UTI seven times in . She reports intermittent pelvic pressure, dysuria, and urinary frequency. Denies these symptoms today.     Chief Complaint   Patient presents with    Gyn Exam     F/u U/S fibroids      Gyne History:  Patient's last menstrual period was 2025 (exact date).  Menses: monthly menses, light bleeding   Last pap: 9/3/2024, NILM, HPV negative   Hx of abnormal pap: in , cryotherapy   Hx of STDS: Denies all sexually transmitted disease's  Currently sexually active with one male partner in the last 6 months. Endorses discomfort with dyspareunia   PCOS diagnosed in      OB History: , SVDx4, CSx3    Past Medical History:   -T2DM, HTN, PCOS    Past Surgical History  Umbilical hernia repair   Gastric sleeve     Medications  Current Outpatient Medications   Medication Sig Dispense Refill    dulaglutide (Trulicity) 1.5 MG/0.5ML pen-injector Inject 1.5 mg into the skin every 7 days. Indications: Type 2 Diabetes 2 mL 0    famotidine (PEPCID) 20 MG tablet Take 1 tablet by mouth in the morning and 1 tablet in the evening. 180 tablet 1    metFORMIN (GLUCOPHAGE) 500 MG tablet Take 1 tablet by mouth 2 times daily (with meals) for 30 days, THEN 1 tablet 2 times daily (with meals). 60 tablet 3    spironolactone (ALDACTONE) 50 MG tablet Take 1 tablet by  mouth daily. 30 tablet 3    dexAMETHasone (DECADRON) 0.5 MG tablet TAKE  2 TAB AT 11 PM X1 AND HAVE BLOOD TEST NEXT MORNING AROUND 8AM 2 tablet 0    metroNIDAZOLE (FLAGYL) 500 MG tablet Take 500 mg by mouth in the morning and 500 mg in the evening. For 7 days      OneTouch Verio test strip USE TWICE DAILY AS DIRECTED FOR 30 DAYS       No current facility-administered medications for this visit.       Allergy  ALLERGIES:  No Known Allergies    Social History  Social History     Tobacco Use    Smoking status: Never     Passive exposure: Current    Smokeless tobacco: Never   Vaping Use    Vaping status: Every Day    Substances: Nicotine   Substance Use Topics    Alcohol use: Yes     Comment: weekly    Drug use: Never       Family History  No family history on file.  No hx of ovarian, breast or other female cancers    Review of Systems:  Negative other than above    Preventative:   Last mammogram: 3/27/2024  Has a primary care provider    Physical Exam:  Objective    Visit Vitals  /81   Pulse 91   Temp 96.7 °F (35.9 °C) (Oral)   Ht 5' 4\" (1.626 m)   Wt 96.6 kg (213 lb 1.2 oz)   LMP 2025 (Exact Date)   SpO2 97%   BMI 36.57 kg/m²    Body mass index is 36.57 kg/m².     Gen: Alert, NAD  CV: regular rate on monitor   Resp: non-labored at rest  Abd: no tenderness, no rigidity, no guarding,   Extr: no edema B/L, no calf tenderness, negative Baldomero's B/L  :   External Genitalia: normal appearance for age, no discharge present, no tenderness present, no inflammatory lesions present   Bladder: urethra normal in appearance, bladder nontender to palpation  SVE: no adnexal masses, cervix closed/thick, uterus anteverted and normal size  SSE: No vulvar/vaginal/cervical lesions or masses. No blood visualized in vaginal vault. Cervix appeared closed.   Neuro/Psych: mood normal, affect appropriate  Skin: color, texture, turgor normal. No rashes or lesions    Assessment and Plan:  Ms. Soriano is a 43 year old  who presents  for pelvic cramping and ultrasound follow up.    Melody was seen today for gyn exam.    Diagnoses and all orders for this visit:    Urinary urgency  -     POCT Urine Dip Auto    Negative pregnancy test  -     POCT Urine Pregnancy      #Pelvic pain   -Pelvic ultrasound imaging reviewed: concern for adenomyosis vs. Fibroid vs. Thickened endometrium. 4.0cm fibroid anterior fibroid.   -Discussed potential etiology of pelvic pain including adenomyosis, leiomyoma, and interstitial cystis. Discussed further evaluation with pelvic MRI however will defer per patient preference at this time. Recommend NSAIDs for pelvic cramping pain management around menses. Encouraged patient to record symptoms for better timing/characteristics of the pain.   -Encouraged consultation with urology for chronic UTIs and possible interstitial cystitis      #Urinary urgency  #History of recurrent UTIs  -Urine dip with trace leuks, no nitrites  -low suspicion for UTI  -given history of recurrent UTIs and intermittent urinary urgency and dysuria, patient may benefit from urology evaluation    #Routine Gynecologic Care  - Continue with yearly breast and pelvic exams.  - Last pap: 9/3/2024, NILM, HPV negative  - Mammogram due   - STD screening declines    Follow Up: 3 months for f/u    Jane Finn MD   Obstetrics and Gynecology, PGY4  ----------------------------  Teaching Attestation:    I have independently seen and evaluated the patient. The plan of care was discussed with resident and formulated under my supervision. The note was reviewed, and I agree with documentation with changes made if needed. I reviewed all the pertinent labs, imaging and medications. Care plan was discussed with patient at the bedside and all questions answered at this time.    Tanya Hawthorne MD      Patient/Caregiver provided printed discharge information.
